# Patient Record
Sex: FEMALE | Race: WHITE | Employment: STUDENT | URBAN - NONMETROPOLITAN AREA
[De-identification: names, ages, dates, MRNs, and addresses within clinical notes are randomized per-mention and may not be internally consistent; named-entity substitution may affect disease eponyms.]

---
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
## 2023-01-26 ENCOUNTER — HOSPITAL ENCOUNTER (INPATIENT)
Age: 23
LOS: 6 days | Discharge: HOME | DRG: 720 | End: 2023-02-01
Attending: EMERGENCY MEDICINE | Admitting: INTERNAL MEDICINE
Payer: MEDICAID

## 2023-01-26 ENCOUNTER — APPOINTMENT (OUTPATIENT)
Dept: GENERAL RADIOLOGY | Age: 23
DRG: 720 | End: 2023-01-26
Payer: MEDICAID

## 2023-01-26 ENCOUNTER — ANESTHESIA (OUTPATIENT)
Dept: OPERATING ROOM | Age: 23
End: 2023-01-26
Payer: MEDICARE

## 2023-01-26 ENCOUNTER — APPOINTMENT (OUTPATIENT)
Dept: CT IMAGING | Age: 23
DRG: 720 | End: 2023-01-26
Payer: MEDICAID

## 2023-01-26 ENCOUNTER — ANESTHESIA EVENT (OUTPATIENT)
Dept: OPERATING ROOM | Age: 23
End: 2023-01-26
Payer: MEDICARE

## 2023-01-26 DIAGNOSIS — N13.5 OBSTRUCTION OF RIGHT URETER: Primary | ICD-10-CM

## 2023-01-26 DIAGNOSIS — A41.9 SEPTICEMIA (HCC): ICD-10-CM

## 2023-01-26 DIAGNOSIS — N20.1 OBSTRUCTION OF RIGHT URETEROPELVIC JUNCTION (UPJ) DUE TO STONE: ICD-10-CM

## 2023-01-26 PROBLEM — N20.0 NEPHROLITHIASIS: Status: ACTIVE | Noted: 2023-01-26

## 2023-01-26 LAB
ALBUMIN SERPL BCG-MCNC: 3.7 G/DL (ref 3.5–5.1)
ALP SERPL-CCNC: 99 U/L (ref 38–126)
ALT SERPL W/O P-5'-P-CCNC: 52 U/L (ref 11–66)
ANION GAP SERPL CALC-SCNC: 12 MEQ/L (ref 8–16)
ANION GAP SERPL CALC-SCNC: 13 MEQ/L (ref 8–16)
AST SERPL-CCNC: 51 U/L (ref 5–40)
B-HCG SERPL QL: NEGATIVE
BACTERIA: ABNORMAL
BASOPHILS ABSOLUTE: 0.1 THOU/MM3 (ref 0–0.1)
BASOPHILS NFR BLD AUTO: 0.3 %
BILIRUB SERPL-MCNC: 1.7 MG/DL (ref 0.3–1.2)
BILIRUB UR QL STRIP: NEGATIVE
BUN SERPL-MCNC: 7 MG/DL (ref 7–22)
BUN SERPL-MCNC: 8 MG/DL (ref 7–22)
CALCIUM SERPL-MCNC: 7.3 MG/DL (ref 8.5–10.5)
CALCIUM SERPL-MCNC: 8.7 MG/DL (ref 8.5–10.5)
CASTS #/AREA URNS LPF: ABNORMAL /LPF
CASTS #/AREA URNS LPF: ABNORMAL /LPF
CHARACTER UR: ABNORMAL
CHARCOAL URNS QL MICRO: ABNORMAL
CHLORIDE SERPL-SCNC: 104 MEQ/L (ref 98–111)
CHLORIDE SERPL-SCNC: 96 MEQ/L (ref 98–111)
CO2 SERPL-SCNC: 20 MEQ/L (ref 23–33)
CO2 SERPL-SCNC: 21 MEQ/L (ref 23–33)
COLOR UR: YELLOW
CREAT SERPL-MCNC: 1.1 MG/DL (ref 0.4–1.2)
CREAT SERPL-MCNC: 1.1 MG/DL (ref 0.4–1.2)
CRYSTALS URNS QL MICRO: ABNORMAL
D DIMER PPP IA.FEU-MCNC: 2303 NG/ML FEU (ref 0–500)
DEPRECATED RDW RBC AUTO: 45.1 FL (ref 35–45)
EOSINOPHIL NFR BLD AUTO: 0.2 %
EOSINOPHILS ABSOLUTE: 0.1 THOU/MM3 (ref 0–0.4)
EPITHELIAL CELLS, UA: ABNORMAL /HPF
ERYTHROCYTE [DISTWIDTH] IN BLOOD BY AUTOMATED COUNT: 14 % (ref 11.5–14.5)
GFR SERPL CREATININE-BSD FRML MDRD: > 60 ML/MIN/1.73M2
GFR SERPL CREATININE-BSD FRML MDRD: > 60 ML/MIN/1.73M2
GLUCOSE SERPL-MCNC: 114 MG/DL (ref 70–108)
GLUCOSE SERPL-MCNC: 151 MG/DL (ref 70–108)
GLUCOSE UR QL STRIP.AUTO: NEGATIVE MG/DL
HCT VFR BLD AUTO: 41.5 % (ref 37–47)
HGB BLD-MCNC: 13.9 GM/DL (ref 12–16)
HGB UR QL STRIP.AUTO: ABNORMAL
IMM GRANULOCYTES # BLD AUTO: 0.39 THOU/MM3 (ref 0–0.07)
IMM GRANULOCYTES NFR BLD AUTO: 1.4 %
INR PPP: 1.68 (ref 0.85–1.13)
KETONES UR QL STRIP.AUTO: ABNORMAL
LACTATE SERPL-SCNC: 1.7 MMOL/L (ref 0.5–2)
LACTATE SERPL-SCNC: 1.8 MMOL/L (ref 0.5–2)
LACTIC ACID, SEPSIS: 2 MMOL/L (ref 0.5–1.9)
LEUKOCYTE ESTERASE UR QL STRIP.AUTO: ABNORMAL
LIPASE SERPL-CCNC: 13.7 U/L (ref 5.6–51.3)
LYMPHOCYTES ABSOLUTE: 0.9 THOU/MM3 (ref 1–4.8)
LYMPHOCYTES NFR BLD AUTO: 3.4 %
MCH RBC QN AUTO: 29.5 PG (ref 26–33)
MCHC RBC AUTO-ENTMCNC: 33.5 GM/DL (ref 32.2–35.5)
MCV RBC AUTO: 88.1 FL (ref 81–99)
MONOCYTES ABSOLUTE: 1.5 THOU/MM3 (ref 0.4–1.3)
MONOCYTES NFR BLD AUTO: 5.5 %
NEUTROPHILS NFR BLD AUTO: 89.2 %
NITRITE UR QL STRIP.AUTO: NEGATIVE
NRBC BLD AUTO-RTO: 0 /100 WBC
OSMOLALITY SERPL CALC.SUM OF ELEC: 262 MOSMOL/KG (ref 275–300)
PH UR STRIP.AUTO: 6.5 [PH] (ref 5–9)
PLATELET # BLD AUTO: 305 THOU/MM3 (ref 130–400)
PMV BLD AUTO: 11.7 FL (ref 9.4–12.4)
POTASSIUM SERPL-SCNC: 3.6 MEQ/L (ref 3.5–5.2)
POTASSIUM SERPL-SCNC: 3.7 MEQ/L (ref 3.5–5.2)
PROT SERPL-MCNC: 7.1 G/DL (ref 6.1–8)
PROT UR STRIP.AUTO-MCNC: 30 MG/DL
RBC # BLD AUTO: 4.71 MILL/MM3 (ref 4.2–5.4)
RBC #/AREA URNS HPF: ABNORMAL /HPF
RENAL EPI CELLS #/AREA URNS HPF: ABNORMAL /[HPF]
SEGMENTED NEUTROPHILS ABSOLUTE COUNT: 24.2 THOU/MM3 (ref 1.8–7.7)
SODIUM SERPL-SCNC: 130 MEQ/L (ref 135–145)
SODIUM SERPL-SCNC: 136 MEQ/L (ref 135–145)
SODIUM UR-SCNC: 46 MEQ/L
SPECIFIC GRAVITY UA: 1.01 (ref 1–1.03)
TROPONIN T: < 0.01 NG/ML
TSH SERPL DL<=0.005 MIU/L-ACNC: 0.42 UIU/ML (ref 0.4–4.2)
UROBILINOGEN, URINE: 1 EU/DL (ref 0–1)
WBC # BLD AUTO: 27.1 THOU/MM3 (ref 4.8–10.8)
WBC #/AREA URNS HPF: > 200 /HPF
YEAST LIKE FUNGI URNS QL MICRO: ABNORMAL

## 2023-01-26 PROCEDURE — 3209999900 FLUORO FOR SURGICAL PROCEDURES

## 2023-01-26 PROCEDURE — 84443 ASSAY THYROID STIM HORMONE: CPT

## 2023-01-26 PROCEDURE — 93010 ELECTROCARDIOGRAM REPORT: CPT | Performed by: INTERNAL MEDICINE

## 2023-01-26 PROCEDURE — 36415 COLL VENOUS BLD VENIPUNCTURE: CPT

## 2023-01-26 PROCEDURE — 2500000003 HC RX 250 WO HCPCS: Performed by: NURSE ANESTHETIST, CERTIFIED REGISTERED

## 2023-01-26 PROCEDURE — 84484 ASSAY OF TROPONIN QUANT: CPT

## 2023-01-26 PROCEDURE — 2580000003 HC RX 258: Performed by: UROLOGY

## 2023-01-26 PROCEDURE — 82365 CALCULUS SPECTROSCOPY: CPT

## 2023-01-26 PROCEDURE — 6360000002 HC RX W HCPCS

## 2023-01-26 PROCEDURE — 74177 CT ABD & PELVIS W/CONTRAST: CPT

## 2023-01-26 PROCEDURE — 99254 IP/OBS CNSLTJ NEW/EST MOD 60: CPT | Performed by: UROLOGY

## 2023-01-26 PROCEDURE — 71046 X-RAY EXAM CHEST 2 VIEWS: CPT

## 2023-01-26 PROCEDURE — 6370000000 HC RX 637 (ALT 250 FOR IP): Performed by: EMERGENCY MEDICINE

## 2023-01-26 PROCEDURE — 99223 1ST HOSP IP/OBS HIGH 75: CPT

## 2023-01-26 PROCEDURE — 3700000000 HC ANESTHESIA ATTENDED CARE: Performed by: UROLOGY

## 2023-01-26 PROCEDURE — 0T768DZ DILATION OF RIGHT URETER WITH INTRALUMINAL DEVICE, VIA NATURAL OR ARTIFICIAL OPENING ENDOSCOPIC: ICD-10-PCS | Performed by: UROLOGY

## 2023-01-26 PROCEDURE — 6370000000 HC RX 637 (ALT 250 FOR IP)

## 2023-01-26 PROCEDURE — 96361 HYDRATE IV INFUSION ADD-ON: CPT

## 2023-01-26 PROCEDURE — 83605 ASSAY OF LACTIC ACID: CPT

## 2023-01-26 PROCEDURE — 84703 CHORIONIC GONADOTROPIN ASSAY: CPT

## 2023-01-26 PROCEDURE — 3600000012 HC SURGERY LEVEL 2 ADDTL 15MIN: Performed by: UROLOGY

## 2023-01-26 PROCEDURE — 80053 COMPREHEN METABOLIC PANEL: CPT

## 2023-01-26 PROCEDURE — 96374 THER/PROPH/DIAG INJ IV PUSH: CPT

## 2023-01-26 PROCEDURE — 85379 FIBRIN DEGRADATION QUANT: CPT

## 2023-01-26 PROCEDURE — C2617 STENT, NON-COR, TEM W/O DEL: HCPCS | Performed by: UROLOGY

## 2023-01-26 PROCEDURE — 85610 PROTHROMBIN TIME: CPT

## 2023-01-26 PROCEDURE — 93005 ELECTROCARDIOGRAM TRACING: CPT

## 2023-01-26 PROCEDURE — 99285 EMERGENCY DEPT VISIT HI MDM: CPT

## 2023-01-26 PROCEDURE — 6360000002 HC RX W HCPCS: Performed by: EMERGENCY MEDICINE

## 2023-01-26 PROCEDURE — 2580000003 HC RX 258: Performed by: EMERGENCY MEDICINE

## 2023-01-26 PROCEDURE — 3700000001 HC ADD 15 MINUTES (ANESTHESIA): Performed by: UROLOGY

## 2023-01-26 PROCEDURE — 2709999900 HC NON-CHARGEABLE SUPPLY: Performed by: UROLOGY

## 2023-01-26 PROCEDURE — 6360000002 HC RX W HCPCS: Performed by: NURSE ANESTHETIST, CERTIFIED REGISTERED

## 2023-01-26 PROCEDURE — 7100000001 HC PACU RECOVERY - ADDTL 15 MIN: Performed by: UROLOGY

## 2023-01-26 PROCEDURE — 6370000000 HC RX 637 (ALT 250 FOR IP): Performed by: PHYSICIAN ASSISTANT

## 2023-01-26 PROCEDURE — 7100000000 HC PACU RECOVERY - FIRST 15 MIN: Performed by: UROLOGY

## 2023-01-26 PROCEDURE — 85025 COMPLETE CBC W/AUTO DIFF WBC: CPT

## 2023-01-26 PROCEDURE — 83690 ASSAY OF LIPASE: CPT

## 2023-01-26 PROCEDURE — 84300 ASSAY OF URINE SODIUM: CPT

## 2023-01-26 PROCEDURE — 81001 URINALYSIS AUTO W/SCOPE: CPT

## 2023-01-26 PROCEDURE — 96375 TX/PRO/DX INJ NEW DRUG ADDON: CPT

## 2023-01-26 PROCEDURE — 93005 ELECTROCARDIOGRAM TRACING: CPT | Performed by: EMERGENCY MEDICINE

## 2023-01-26 PROCEDURE — 87040 BLOOD CULTURE FOR BACTERIA: CPT

## 2023-01-26 PROCEDURE — 2580000003 HC RX 258

## 2023-01-26 PROCEDURE — 2580000003 HC RX 258: Performed by: NURSE ANESTHETIST, CERTIFIED REGISTERED

## 2023-01-26 PROCEDURE — 2140000000 HC CCU INTERMEDIATE R&B

## 2023-01-26 PROCEDURE — 3600000002 HC SURGERY LEVEL 2 BASE: Performed by: UROLOGY

## 2023-01-26 PROCEDURE — C1769 GUIDE WIRE: HCPCS | Performed by: UROLOGY

## 2023-01-26 PROCEDURE — 6360000004 HC RX CONTRAST MEDICATION: Performed by: EMERGENCY MEDICINE

## 2023-01-26 DEVICE — URETERAL STENT
Type: IMPLANTABLE DEVICE | Status: FUNCTIONAL
Brand: PERCUFLEX™ PLUS

## 2023-01-26 RX ORDER — SODIUM CHLORIDE, SODIUM LACTATE, POTASSIUM CHLORIDE, AND CALCIUM CHLORIDE .6; .31; .03; .02 G/100ML; G/100ML; G/100ML; G/100ML
2000 INJECTION, SOLUTION INTRAVENOUS ONCE
Status: COMPLETED | OUTPATIENT
Start: 2023-01-26 | End: 2023-01-26

## 2023-01-26 RX ORDER — SODIUM CHLORIDE 9 MG/ML
INJECTION, SOLUTION INTRAVENOUS PRN
Status: DISCONTINUED | OUTPATIENT
Start: 2023-01-26 | End: 2023-02-01 | Stop reason: HOSPADM

## 2023-01-26 RX ORDER — 0.9 % SODIUM CHLORIDE 0.9 %
1000 INTRAVENOUS SOLUTION INTRAVENOUS ONCE
Status: COMPLETED | OUTPATIENT
Start: 2023-01-26 | End: 2023-01-26

## 2023-01-26 RX ORDER — ONDANSETRON 2 MG/ML
4 INJECTION INTRAMUSCULAR; INTRAVENOUS EVERY 6 HOURS PRN
Status: DISCONTINUED | OUTPATIENT
Start: 2023-01-26 | End: 2023-02-01 | Stop reason: HOSPADM

## 2023-01-26 RX ORDER — ROCURONIUM BROMIDE 10 MG/ML
INJECTION, SOLUTION INTRAVENOUS PRN
Status: DISCONTINUED | OUTPATIENT
Start: 2023-01-26 | End: 2023-01-26 | Stop reason: SDUPTHER

## 2023-01-26 RX ORDER — ACETAMINOPHEN 650 MG/1
650 SUPPOSITORY RECTAL EVERY 6 HOURS PRN
Status: DISCONTINUED | OUTPATIENT
Start: 2023-01-26 | End: 2023-01-28

## 2023-01-26 RX ORDER — SODIUM CHLORIDE 0.9 % (FLUSH) 0.9 %
10 SYRINGE (ML) INJECTION PRN
Status: DISCONTINUED | OUTPATIENT
Start: 2023-01-26 | End: 2023-02-01 | Stop reason: HOSPADM

## 2023-01-26 RX ORDER — ACETAMINOPHEN 500 MG
1000 TABLET ORAL ONCE
Status: DISCONTINUED | OUTPATIENT
Start: 2023-01-26 | End: 2023-01-26

## 2023-01-26 RX ORDER — ACETAMINOPHEN 325 MG/1
650 TABLET ORAL EVERY 6 HOURS PRN
Status: DISCONTINUED | OUTPATIENT
Start: 2023-01-26 | End: 2023-01-28

## 2023-01-26 RX ORDER — SODIUM CHLORIDE, SODIUM LACTATE, POTASSIUM CHLORIDE, CALCIUM CHLORIDE 600; 310; 30; 20 MG/100ML; MG/100ML; MG/100ML; MG/100ML
INJECTION, SOLUTION INTRAVENOUS CONTINUOUS
Status: DISCONTINUED | OUTPATIENT
Start: 2023-01-26 | End: 2023-01-27 | Stop reason: ALTCHOICE

## 2023-01-26 RX ORDER — LIDOCAINE HYDROCHLORIDE 20 MG/ML
INJECTION, SOLUTION INFILTRATION; PERINEURAL PRN
Status: DISCONTINUED | OUTPATIENT
Start: 2023-01-26 | End: 2023-01-26 | Stop reason: SDUPTHER

## 2023-01-26 RX ORDER — ACETAMINOPHEN 650 MG/1
650 SUPPOSITORY RECTAL ONCE
Status: COMPLETED | OUTPATIENT
Start: 2023-01-26 | End: 2023-01-26

## 2023-01-26 RX ORDER — ONDANSETRON 4 MG/1
4 TABLET, ORALLY DISINTEGRATING ORAL EVERY 8 HOURS PRN
Status: DISCONTINUED | OUTPATIENT
Start: 2023-01-26 | End: 2023-02-01 | Stop reason: HOSPADM

## 2023-01-26 RX ORDER — PHENYLEPHRINE HYDROCHLORIDE 10 MG/ML
INJECTION INTRAVENOUS PRN
Status: DISCONTINUED | OUTPATIENT
Start: 2023-01-26 | End: 2023-01-26 | Stop reason: SDUPTHER

## 2023-01-26 RX ORDER — SUCCINYLCHOLINE/SOD CL,ISO/PF 200MG/10ML
SYRINGE (ML) INTRAVENOUS PRN
Status: DISCONTINUED | OUTPATIENT
Start: 2023-01-26 | End: 2023-01-26 | Stop reason: SDUPTHER

## 2023-01-26 RX ORDER — POLYETHYLENE GLYCOL 3350 17 G/17G
17 POWDER, FOR SOLUTION ORAL DAILY PRN
Status: DISCONTINUED | OUTPATIENT
Start: 2023-01-26 | End: 2023-02-01 | Stop reason: HOSPADM

## 2023-01-26 RX ORDER — SODIUM CHLORIDE 0.9 % (FLUSH) 0.9 %
10 SYRINGE (ML) INJECTION EVERY 12 HOURS SCHEDULED
Status: DISCONTINUED | OUTPATIENT
Start: 2023-01-26 | End: 2023-02-01 | Stop reason: HOSPADM

## 2023-01-26 RX ORDER — ONDANSETRON 2 MG/ML
4 INJECTION INTRAMUSCULAR; INTRAVENOUS ONCE
Status: COMPLETED | OUTPATIENT
Start: 2023-01-26 | End: 2023-01-26

## 2023-01-26 RX ORDER — SODIUM CHLORIDE 9 MG/ML
INJECTION, SOLUTION INTRAVENOUS CONTINUOUS PRN
Status: DISCONTINUED | OUTPATIENT
Start: 2023-01-26 | End: 2023-01-26 | Stop reason: SDUPTHER

## 2023-01-26 RX ORDER — ENOXAPARIN SODIUM 100 MG/ML
40 INJECTION SUBCUTANEOUS EVERY 24 HOURS
Status: DISCONTINUED | OUTPATIENT
Start: 2023-01-26 | End: 2023-02-01 | Stop reason: HOSPADM

## 2023-01-26 RX ADMIN — SODIUM CHLORIDE 1000 ML: 9 INJECTION, SOLUTION INTRAVENOUS at 07:55

## 2023-01-26 RX ADMIN — Medication 140 MG: at 12:08

## 2023-01-26 RX ADMIN — SUGAMMADEX 200 MG: 100 INJECTION, SOLUTION INTRAVENOUS at 12:42

## 2023-01-26 RX ADMIN — LIDOCAINE HYDROCHLORIDE 100 MG: 20 INJECTION, SOLUTION INFILTRATION; PERINEURAL at 12:08

## 2023-01-26 RX ADMIN — SODIUM CHLORIDE, POTASSIUM CHLORIDE, SODIUM LACTATE AND CALCIUM CHLORIDE: 600; 310; 30; 20 INJECTION, SOLUTION INTRAVENOUS at 14:06

## 2023-01-26 RX ADMIN — SODIUM CHLORIDE: 9 INJECTION, SOLUTION INTRAVENOUS at 12:05

## 2023-01-26 RX ADMIN — SODIUM CHLORIDE 1000 ML: 9 INJECTION, SOLUTION INTRAVENOUS at 08:52

## 2023-01-26 RX ADMIN — IOPAMIDOL 80 ML: 755 INJECTION, SOLUTION INTRAVENOUS at 09:32

## 2023-01-26 RX ADMIN — ONDANSETRON 4 MG: 2 INJECTION INTRAMUSCULAR; INTRAVENOUS at 07:56

## 2023-01-26 RX ADMIN — ACETAMINOPHEN 650 MG: 325 TABLET ORAL at 19:02

## 2023-01-26 RX ADMIN — ACETAMINOPHEN 650 MG: 650 SUPPOSITORY RECTAL at 10:17

## 2023-01-26 RX ADMIN — SODIUM CHLORIDE, POTASSIUM CHLORIDE, SODIUM LACTATE AND CALCIUM CHLORIDE 2000 ML: 600; 310; 30; 20 INJECTION, SOLUTION INTRAVENOUS at 16:09

## 2023-01-26 RX ADMIN — ROCURONIUM BROMIDE 35 MG: 10 INJECTION INTRAVENOUS at 12:22

## 2023-01-26 RX ADMIN — ROCURONIUM BROMIDE 5 MG: 10 INJECTION INTRAVENOUS at 12:08

## 2023-01-26 RX ADMIN — SODIUM CHLORIDE 1000 ML: 9 INJECTION, SOLUTION INTRAVENOUS at 10:29

## 2023-01-26 RX ADMIN — PHENYLEPHRINE HYDROCHLORIDE 100 MCG: 10 INJECTION INTRAVENOUS at 12:06

## 2023-01-26 RX ADMIN — CEFTRIAXONE SODIUM 1000 MG: 1 INJECTION, POWDER, FOR SOLUTION INTRAMUSCULAR; INTRAVENOUS at 10:17

## 2023-01-26 RX ADMIN — ENOXAPARIN SODIUM 40 MG: 100 INJECTION SUBCUTANEOUS at 20:55

## 2023-01-26 RX ADMIN — ACETAMINOPHEN 325 MG: 325 SUPPOSITORY RECTAL at 21:30

## 2023-01-26 ASSESSMENT — PAIN - FUNCTIONAL ASSESSMENT
PAIN_FUNCTIONAL_ASSESSMENT: NONE - DENIES PAIN

## 2023-01-26 ASSESSMENT — LIFESTYLE VARIABLES: HOW OFTEN DO YOU HAVE A DRINK CONTAINING ALCOHOL: NEVER

## 2023-01-26 NOTE — ANESTHESIA POSTPROCEDURE EVALUATION
Department of Anesthesiology  Postprocedure Note    Patient: Cheng Rojo  MRN: [id number]  YOB: 2000  Date of evaluation: 1/26/2023      Procedure Summary     Date: 01/26/23 Room / Location: [facility] / [facility]    Anesthesia Start: 4926 Anesthesia Stop: 1251    Procedure: Cystoscopy Right Stent Insertion (Right) Diagnosis:       Obstruction of right ureteropelvic junction (UPJ) due to stone      (Obstruction of right ureteropelvic junction (UPJ) due to stone [N20.1])    Surgeons: Deana Alexandre MD Responsible Provider: Dayanna Martinez DO    Anesthesia Type: General ASA Status: 2          Anesthesia Type: General    Ck Phase I: Ck Score: 9    Ck Phase II:        Anesthesia Post Evaluation    Comments: Bernice Jimenez 58  POST-ANESTHESIA NOTE       Name:  Cheng Rojo                                         Age:  23 y.o.   MRN:  [id number]      Last Vitals:  BP (!) 92/54   Pulse (!) 135   Temp 100.1 °F (37.8 °C) (Oral)   Resp 20   Ht 5' 2\" (1.575 m)   Wt 203 lb (92.1 kg)   LMP 01/12/2023 (Exact Date)   SpO2 96%   BMI 37.13 kg/m²   Patient Vitals in the past 4 hrs:  01/26/23 1515, BP:(!) 92/54, Pulse:(!) 135  01/26/23 1330, BP:(!) 95/53, Temp:100.1 °F (37.8 °C), Temp src:Oral, Pulse:(!) 131, Resp:20, SpO2:96 %, Height:5' 2\" (1.575 m), Weight:203 lb (92.1 kg)  01/26/23 1320, BP:(!) 92/55, Pulse:(!) 133, Resp:22, SpO2:96 %  01/26/23 1315, BP:(!) 96/52, Pulse:(!) 132, Resp:26, SpO2:96 %  01/26/23 1310, BP:(!) 98/52, Pulse:(!) 135, Resp:28, SpO2:97 %  01/26/23 1305, BP:(!) 97/55, Temp:(!) 100.8 °F (38.2 °C), Temp src:Axillary, Pulse:(!) 133, Resp:28, SpO2:94 %  01/26/23 1300, BP:(!) 98/57, Pulse:(!) 134, Resp:26, SpO2:93 %  01/26/23 1255, BP:(!) 103/58, Pulse:(!) 135, Resp:24, SpO2:93 %  01/26/23 1250, BP:(!) 107/54, Pulse:(!) 143, Resp:26, SpO2:93 %  01/26/23 1245, BP:(!) 112/58, Temp:99.1 °F (37.3 °C), Temp src:Temporal, Pulse:(!) 144, Resp:28, SpO2:92 %    Level of Consciousness:  Awake    Respiratory:  Stable    Oxygen Saturation:  Stable    Cardiovascular:  Stable    Hydration:  Adequate    PONV:  Stable    Post-op Pain:  Adequate analgesia    Post-op Assessment:  No apparent anesthetic complications    Additional Follow-Up / Treatment / Comment:  None    Brian Holloway.  [address] [facility]   January 26, 2023   3:55 PM

## 2023-01-26 NOTE — ED TRIAGE NOTES
Pt presents to the ED through triage with c/c emesis. Pt reports that she believes she passed a kidney stone yesterday and since then, she has has been vomiting. Pt denies pain on arrival, reports nausea. Vitals as documented.

## 2023-01-26 NOTE — H&P
.        Hospitalist History & Physical    Patient:  Bc Pantoja    Unit/Bed:SUMEET /SUMEET  YOB: 2000  MRN: [id number]   Acct: [de-identified]   PCP: DARI Chavez CNP  Code Status: Prior    Date of Service: Pt seen/examined on 01/26/23 and admitted to Inpatient with expected LOS greater than two midnights due to medical therapy. Chief Complaint: emesis    Assessment/Plan:    Sepsis: likely 2/2 pyelonephritis, possible appendicitis per CT? Sepsis  Identified at 1217  SIRS Criteria Temperature >100.4 or <96.8, Heart Rate >90, and WBC >12k or <4k or >10% Bands  Organ Dysfunction: None  Cultures Blood x2  Antibiotics Piperacillin - Tazobactam  and Ceftriaxone  Volume Expansion: 30cc/kg of Normal Saline was received/ordered- per ER. Obstructing Nephrolithiasis: CT shows a 3mm stone at the UVJ. ER consulted urology - patient undergoing procedure for removal 1/26. Urology following and appreciate recs. Pyelonephritis: CT shows right perinephric stranding and inflammation with diminished enhancement of right renal parenchyma and renal pelvis. Received zosyn and ceftriaxone in the ER. Continue Ceftriaxone and continue to monitor. Sinus Tachycardia: continued tachycardia despite 3L of fluids, continue IVF at 100 ml/hr. EKG shows sinus tachycardia. Febrile to 103. Trop pending. Monitor for improvement after procedure for removal of obstructing stone. Could consider echo if no improvements in HR. Possible Appendicitis?: CT shows 7 mm in diameter appendix contains fluid, no periappendiceal fat stranding. ER discussed with gen/surg. Gen/surg feels an acute appendicitis is unlikely at this time. Hyponatremia: 130 on admission. Likely 2/2 hypovolemia hyponatremia from emesis. IVF bolus and infusion. Repeat bmp pending and urine sodium pending. Continue to monitor. Hx of Hydrocephalus s/p shunt: noted per chart review, sequelae of prematurity.      History of Present Illness:  Hyacinth ARMSTRONG Davy Armstrong is a 23 y.o. female with PMHx of hydrocephalus and prematurity who presented to [facility] with chief complaint of emesis. Patient reports she started vomiting yesterday with no associated nausea. States she could not keep anything down and continued to vomit today prompting her to come to the ER. She reports no associated pain with the vomiting. States she does not have abdominal pain, no urinary burning/discomfort/hesitancy/ frequency. States she does feel chilled and lightheaded with standing. Denies any headache, chest pain, palpitations, shortness of breath, and abdominal pain. Review of Systems: Pertinent positives as noted in the HPI. All other systems reviewed and negative. Past Medical History:        Diagnosis Date    ADHD (attention deficit hyperactivity disorder)     Premature baby     27 weeks       Past Surgical History:        Procedure Laterality Date    ACHILLES TENDON SURGERY      CHOLECYSTECTOMY, LAPAROSCOPIC N/A 7/16/2017    LAPAROSCOPY; OPEN CHOLECYSTECTOMY performed by Aime Brooks MD at [facility]  shunt 1999    VENTRICULOPERITONEAL SHUNT         Home Medications:   No current facility-administered medications on file prior to encounter. No current outpatient medications on file prior to encounter. Allergies:    Patient has no known allergies. Social History:    reports that she has never smoked. She does not have any smokeless tobacco history on file. She reports that she does not drink alcohol and does not use drugs. Family History:   History reviewed. No pertinent family history. Diet:  Diet NPO      Physical Exam:  BP (!) 100/51   Pulse (!) 143   Temp 98.6 °F (37 °C) (Oral)   Resp 20   Ht 5' 2\" (1.575 m)   Wt 203 lb (92.1 kg)   LMP 01/12/2023 (Exact Date)   SpO2 94%   BMI 37.13 kg/m²   General:  Pleasant female resting in bed, no apparent distress. HEENT:  normocephalic and atraumatic. No scleral icterus. PERR.  Neck: supple. No JVD. Trachea midline. Lungs: clear to auscultation. No retractions  Cardiac: Tachycardia, regular rhythm without murmur. Abdomen:  Extremely tender with guarding to the RLL and suprapubic regions. Nontender to LLL and upper quadrants. Bowel sounds positive. Extremities:  No clubbing, cyanosis, or edema x 4. Vasculature: capillary refill < 3 seconds. Palpable LE pulses bilaterally. Skin:  warm and dry. No rashes or lesions. Psych:  Alert and oriented x3. Affect flat  Neurologic:  No focal deficit. No seizures. Data: (All radiographs, tracings, PFTs, and imaging are personally viewed and interpreted unless otherwise noted)  Labs:   Recent Labs     01/26/23  0800   WBC 27.1*   HGB 13.9   HCT 41.5        Recent Labs     01/26/23  0800   *   K 3.6   CL 96*   CO2 21*   BUN 8   CREATININE 1.1   CALCIUM 8.7     Recent Labs     01/26/23  0800   AST 51*   ALT 52   BILITOT 1.7*   ALKPHOS 99     No results for input(s): INR in the last 72 hours. No results for input(s): Marylou Earnest in the last 72 hours. Urinalysis:   Lab Results   Component Value Date/Time    NITRU NEGATIVE 01/26/2023 08:55 AM    WBCUA > 200 01/26/2023 08:55 AM    BACTERIA FEW 01/26/2023 08:55 AM    RBCUA 5-10 01/26/2023 08:55 AM    BLOODU MODERATE 01/26/2023 08:55 AM    SPECGRAV 1.009 01/26/2023 08:55 AM    GLUCOSEU NEGATIVE 07/16/2017 05:00 AM       EKG: sinus tachycardia, cgmw=139    Radiology:  CT ABDOMEN PELVIS W IV CONTRAST Additional Contrast? None   Final Result   1. A 3 mm obstructing calculus at the right ureterovesicular junction results in mild right hydronephrosis and hydroureter with moderate right perinephric stranding and inflammation. Diminished enhancement of the right renal parenchyma as well as    enhancement of the right renal pelvis and proximal right ureter suggest pyelonephritis. Correlate with urinalysis.       2. The appendix measures 7 mm in diameter and contains fluid however there is no periappendiceal fat stranding or inflammation to suggest acute appendicitis. 3. Limited examination secondary to patient motion. Chronic findings are discussed. **This report has been created using voice recognition software. It may contain minor errors which are inherent in voice recognition technology. **      Final report electronically signed by Dr Lizbet Frias on 1/26/2023 9:50 AM        CT ABDOMEN PELVIS W IV CONTRAST Additional Contrast? None    Result Date: 1/26/2023  PROCEDURE: CT ABDOMEN PELVIS W IV CONTRAST CLINICAL INFORMATION: Right-sided abdominal pain. COMPARISON: CT abdomen and pelvis 7/16/2017. Gallbladder ultrasound 7/16/2017. TECHNIQUE: Axial 5 mm CT images were obtained through the abdomen and pelvis after the administration of 80  cc Isovue 370 intravenous contrast. Coronal and sagittal reconstructions were obtained. All CT scans at this facility use dose modulation, iterative reconstruction, and/or weight-based dosing when appropriate to reduce radiation dose to as low as reasonably achievable. FINDINGS: Lung bases: Dependent atelectasis is present. Liver/gallbladder/bilary tree: The gallbladder is surgically absent. No biliary ductal dilatation is observed. Hepatic steatosis appears unchanged. Pancreas: The evaluation is limited secondary to patient motion. Spleen : Unremarkable accounting for patient motion. Adrenal glands: Unremarkable accounting for patient motion. Kidneys/ ureters/ bladder: A 3 mm obstructing calculus at the right ureterovesicular junction (series 2, image 78) resultant mild right hydronephrosis and right hydroureter. The right renal pelvis and proximal right ureter are enhancing with moderate perinephric fat stranding and inflammation observed. Patchy diminished enhancement of the right renal parenchyma is visualized. The left kidney is unremarkable. The urinary bladder is partially distended and grossly unremarkable. Gastrointestinal:  A  shunt in the right upper quadrant appears intact. No bowel obstruction, free fluid, fluid collection, or free air is observed. The appendix contains fluid however no periappendiceal fat stranding or inflammation is observed. A moderate sliding-type hiatal hernia appears stable. Retroperitoneum / lymph nodes: The aorta is not dilated. No lymphadenopathy is visualized. Pelvis: No adnexal lesions are observed. A 10 mm follicle is seen within the right ovary. Musculoskeletal: The visualized skeletal structures appear intact. 1. A 3 mm obstructing calculus at the right ureterovesicular junction results in mild right hydronephrosis and hydroureter with moderate right perinephric stranding and inflammation. Diminished enhancement of the right renal parenchyma as well as enhancement of the right renal pelvis and proximal right ureter suggest pyelonephritis. Correlate with urinalysis. 2. The appendix measures 7 mm in diameter and contains fluid however there is no periappendiceal fat stranding or inflammation to suggest acute appendicitis. 3. Limited examination secondary to patient motion. Chronic findings are discussed. **This report has been created using voice recognition software. It may contain minor errors which are inherent in voice recognition technology. ** Final report electronically signed by Dr Sparkle Villa on 1/26/2023 9:50 AM        Tele:   [x] yes             [] no      Thank you Sedonia Nyhan, APRN - CNP for the opportunity to be involved in this patient's care.     Electronically signed by Jose M Thompson PA-C on 1/26/2023 at 12:14 PM

## 2023-01-26 NOTE — ANESTHESIA PRE PROCEDURE
Department of Anesthesiology  Preprocedure Note       Name:  Dalton De La Cruz   Age:  23 y. o.  :  2000                                          MRN:  [id number]         Date:  2023      Surgeon: Essence De La Garza):  Jhony Franco MD    Procedure: Procedure(s):  Cystoscopy Right Stent vs Laser Lithotripsy Basket Retrieval of Stone Fragments    Medications prior to admission:   Prior to Admission medications    Not on File       Current medications:    No current facility-administered medications for this encounter. No current outpatient medications on file. Allergies:  No Known Allergies    Problem List:    Patient Active Problem List   Diagnosis Code    Abdominal pain R10.9    Nephrolithiasis N20.0       Past Medical History:        Diagnosis Date    ADHD (attention deficit hyperactivity disorder)     Premature baby     27 weeks       Past Surgical History:        Procedure Laterality Date    ACHILLES TENDON SURGERY      CHOLECYSTECTOMY, LAPAROSCOPIC N/A 2017    LAPAROSCOPY; OPEN CHOLECYSTECTOMY performed by Irene Yeung MD at [facility]      revision  shunt     VENTRICULOPERITONEAL SHUNT         Social History:    Social History     Tobacco Use    Smoking status: Never    Smokeless tobacco: Not on file   Substance Use Topics    Alcohol use:  No                                Counseling given: Not Answered      Vital Signs (Current):   Vitals:    23 0848 23 0953 23 1027 23 1136   BP:  (!) 107/52 (!) 105/55 (!) 100/51   Pulse:  (S) (!) 150 (!) 146 (!) 143   Resp:  20 20 20   Temp:  (S) (!) 103 °F (39.4 °C)  98.6 °F (37 °C)   TempSrc:  Oral  Oral   SpO2:  98% 97% 94%   Weight: 203 lb (92.1 kg)      Height:                                                  BP Readings from Last 3 Encounters:   23 (!) 100/51   17 118/70   17 127/75       NPO Status: BMI:   Wt Readings from Last 3 Encounters:   01/26/23 203 lb (92.1 kg)   07/31/17 166 lb (75.3 kg) (91 %, Z= 1.35)*   07/20/17 169 lb 3.2 oz (76.7 kg) (92 %, Z= 1.42)*     * Growth percentiles are based on [facility] (Girls, 2-20 Years) data. Body mass index is 37.13 kg/m². CBC:   Lab Results   Component Value Date/Time    WBC 27.1 01/26/2023 08:00 AM    RBC 4.71 01/26/2023 08:00 AM    HGB 13.9 01/26/2023 08:00 AM    HCT 41.5 01/26/2023 08:00 AM    MCV 88.1 01/26/2023 08:00 AM    RDW 13.8 07/18/2017 09:10 AM     01/26/2023 08:00 AM       CMP:   Lab Results   Component Value Date/Time     01/26/2023 08:00 AM    K 3.6 01/26/2023 08:00 AM    CL 96 01/26/2023 08:00 AM    CO2 21 01/26/2023 08:00 AM    BUN 8 01/26/2023 08:00 AM    CREATININE 1.1 01/26/2023 08:00 AM    LABGLOM >60 01/26/2023 08:00 AM    GLUCOSE 151 01/26/2023 08:00 AM    PROT 7.1 01/26/2023 08:00 AM    CALCIUM 8.7 01/26/2023 08:00 AM    BILITOT 1.7 01/26/2023 08:00 AM    ALKPHOS 99 01/26/2023 08:00 AM    AST 51 01/26/2023 08:00 AM    ALT 52 01/26/2023 08:00 AM       POC Tests: No results for input(s): POCGLU, POCNA, POCK, POCCL, POCBUN, POCHEMO, POCHCT in the last 72 hours.     Coags: No results found for: PROTIME, INR, APTT    HCG (If Applicable):   Lab Results   Component Value Date    PREGSERUM NEGATIVE 01/26/2023        ABGs: No results found for: PHART, PO2ART, QFW4YWR, LSK2IOO, BEART, I4GVWCST     Type & Screen (If Applicable):  No results found for: LABABO, LABRH    Drug/Infectious Status (If Applicable):  No results found for: HIV, HEPCAB    COVID-19 Screening (If Applicable): No results found for: COVID19        Anesthesia Evaluation  Patient summary reviewed  Airway: Mallampati: III  TM distance: >3 FB   Neck ROM: full  Mouth opening: > = 3 FB   Dental:          Pulmonary:                              Cardiovascular:          ECG reviewed                        Neuro/Psych:   (+) psychiatric history:             ROS comment:  shunt GI/Hepatic/Renal:             Endo/Other:                     Abdominal:             Vascular: Other Findings:           Anesthesia Plan      MAC     ASA 2       Induction: intravenous. Anesthetic plan and risks discussed with patient. Plan discussed with CRNA. Maye Steele.  [address] [place] [address]   1/26/2023

## 2023-01-26 NOTE — OP NOTE
Operative Note      Patient: Elva Holloway  YOB: 2000  MRN: [id number]    Date of Procedure: 1/26/2023    Pre-Op Diagnosis: right ureteral stone, UTI    Post-Op Diagnosis: Same       Procedure(s):  Cystoscopy Right Stent Insertion    Surgeon(s):  Enrique Dent MD    Assistant:   * No surgical staff found *    Anesthesia: General    Estimated Blood Loss (mL): Minimal    Complications: None    Specimens:   ID Type Source Tests Collected by Time Destination   A : ureteral stone Tissue Ureter SURGICAL PATHOLOGY Enrique Dent MD 1/26/2023 1231        Implants:  Implant Name Type Inv. Item Serial No.  Lot No. LRB No. Used Action   STENT URET 6FR L24CM HYDR+ GRAD CIRCUMFERENTIAL MRK LO PROF - [id number]  STENT URET 6FR L24CM HYDR+ GRAD CIRCUMFERENTIAL MRK LO PROF  [employer] [facility] UROLOGY- [id number] Right 1 Implanted         Drains: * No LDAs found *    Findings: stone within bladder, presumably a passed ureteral stone    Detailed Description of Procedure:     INDICATIONS FOR PROCEDURE:  Elva Holloway is a 23 y.o. female presents for UTI, WBC 27, right ureteral stone, fever 103. After the risks, benefits, alternatives, of the procedure were discussed with the patient, informed consent was obtained. The patient elected to proceed. DETAILS OF THE PROCEDURE:  The patient was brought back to the preoperative holding area to the operating suite, and was transferred to the operating table where the patient lay in supine position. General endotracheal anesthesia was induced, and patient was prepped and draped in standard surgical fashion after being placed in dorsolithotomy position. A proper timeout was performed, preoperative antibiotics were given. A rigid cystoscope with a 22 [place] sheath with 30 degree lens was inserted in the patient's urethral and into the bladder with ease. We then focused our attention on the right ureteral orifice, which we cannulated with our glidewire.  Over our glidewire we placed a 6x24 cm double-J ureteral stent and we noted appropriate placement in the upper collecting system using fluoroscopy. We then removed our wire. There was good proximal and distal curl. We did not leave the string at the end of the stent. We then drained the patient's bladder and removed the scope and the procedure was subsequently terminated.     Plan:    The patient will need to follow up for definitive cystoscopy right ureteroscopy, possible HLL, possible stent removal versus exchange in few weeks    Electronically signed by Kelly Saha M.D, MD on 1/26/2023 at 12:38 PM

## 2023-01-26 NOTE — CONSULTS
[address] [facility],6Th [address] EMERGENCY DEPT  [address] [facility] [address]  Dept: [phone]  Loc: [phone]  Visit Date: 1/26/2023    Urology Consult Note    Reason for Consult:  obstructing stone  Requesting Physician:  ER    History Obtained From:  patient, electronic medical record    Chief Complaint: emesis    HISTORY OF PRESENT ILLNESS:                The patient is a 23 y.o. female with significant past medical history of see below who presents with uncontrolled n/v. Reports she may have passed a kidney stone. Having hypotension, tachy, low grade temp  Also, possible appendicitis on CT, ER contacted gen/surg, they will evaluate   CT shows rigth UVJ 3mm obstructing stone      Past Medical History:        Diagnosis Date    ADHD (attention deficit hyperactivity disorder)     Premature baby     27 weeks     Past Surgical History:        Procedure Laterality Date    ACHILLES TENDON SURGERY      CHOLECYSTECTOMY, LAPAROSCOPIC N/A 7/16/2017    LAPAROSCOPY; OPEN CHOLECYSTECTOMY performed by Bob Macias MD at [facility]  shunt 1999    VENTRICULOPERITONEAL SHUNT       Allergies:  Patient has no known allergies.   Social History:  Social History     Socioeconomic History    Marital status: Single     Spouse name: Not on file    Number of children: Not on file    Years of education: Not on file    Highest education level: Not on file   Occupational History    Not on file   Tobacco Use    Smoking status: Never    Smokeless tobacco: Not on file   Substance and Sexual Activity    Alcohol use: No    Drug use: No    Sexual activity: Yes     Partners: Male   Other Topics Concern    Not on file   Social History Narrative    Not on file     Social Determinants of Health     Financial Resource Strain: Not on file   Food Insecurity: Not on file   Transportation Needs: Not on file   Physical Activity: Not on file   Stress: Not on file   Social Connections: Not on file   Intimate Partner Violence: Not on file   Housing Stability: Not on file     Family History:   History reviewed. No pertinent family history. ROS:  Constitutional: Negative for chills, fatigue, fever, or weight loss. Eyes: Denies reported visual changes. ENT: Denies headache, difficulty swallowing, earache, and nosebleeds. Cardiovascular: Negative for chest pain, palpitations, tachycardia or edema. Respiratory: Denies cough or SOB. GI:++n/v  : see HPI. Musculoskeletal: Patient denies low back pain or painful or reduced range of ROM. Neurological: The patient denies any symptoms of neurological impairment or TIA. Psychiatric: Denies anxiety or depression. Skin: Denies rash or lesions. All remaining ROS negative. PHYSICAL EXAM:  VITALS:  BP (!) 97/54   Pulse (!) 130   Temp 100.1 °F (37.8 °C) (Oral)   Resp 20   Ht 5' 2\" (1.575 m)   Wt 203 lb (92.1 kg)   LMP 01/12/2023 (Exact Date)   SpO2 100%   BMI 37.13 kg/m² . Nursing note and vitals reviewed. Constitutional: Alert and oriented times x3, no acute distress, and cooperative to examination with appropriate mood and affect. HEENT:   Head:         Normocephalic and atraumatic. Mouth/Throat:          Mucous membranes are normal.   Eyes:         EOM are normal. No scleral icterus. Nose:    The external appearance of the nose is normal  Ears: The ears appear normal to external inspection. Cardiovascular:       Normal rate, regular rhythm. Pulmonary/Chest:  Normal respiratory rate and rhthym. No use of accessory muscles. Lungs clear bilaterally. Abdominal:          Soft. Mild tenderness           Genitalia:    Voiding spontaneously  Musculoskeletal:    Normal range of motion. He exhibits no edema or tenderness of lower extremities. Extremities:    No cyanosis, clubbing, or edema present. Neurological:    Alert and oriented.      DATA:  CBC:   Lab Results   Component Value Date/Time    WBC 27.1 01/26/2023 08:00 AM    RBC 4.71 01/26/2023 08:00 AM    HGB 13.9 01/26/2023 08:00 AM    HCT 41.5 01/26/2023 08:00 AM    MCV 88.1 01/26/2023 08:00 AM    MCH 29.5 01/26/2023 08:00 AM    MCHC 33.5 01/26/2023 08:00 AM    RDW 13.8 07/18/2017 09:10 AM     01/26/2023 08:00 AM    MPV 11.7 01/26/2023 08:00 AM     BMP:    Lab Results   Component Value Date/Time     01/26/2023 08:00 AM    K 3.6 01/26/2023 08:00 AM    CL 96 01/26/2023 08:00 AM    CO2 21 01/26/2023 08:00 AM    BUN 8 01/26/2023 08:00 AM    CREATININE 1.1 01/26/2023 08:00 AM    CALCIUM 8.7 01/26/2023 08:00 AM    LABGLOM >60 01/26/2023 08:00 AM    GLUCOSE 151 01/26/2023 08:00 AM     BUN/Creatinine:    Lab Results   Component Value Date/Time    BUN 8 01/26/2023 08:00 AM    CREATININE 1.1 01/26/2023 08:00 AM     Magnesium:  No results found for: MG  Phosphorus:  No results found for: PHOS  PT/INR:  No results found for: PROTIME, INR  U/A:    Lab Results   Component Value Date/Time    COLORU YELLOW 01/26/2023 08:55 AM    PHUR 6.5 01/26/2023 08:55 AM    LABCAST NONE SEEN 01/26/2023 08:55 AM    LABCAST NONE SEEN 01/26/2023 08:55 AM    WBCUA > 200 01/26/2023 08:55 AM    RBCUA 5-10 01/26/2023 08:55 AM    MUCUS THREADS 04/28/2014 02:15 PM    YEAST NONE SEEN 01/26/2023 08:55 AM    BACTERIA FEW 01/26/2023 08:55 AM    SPECGRAV 1.009 01/26/2023 08:55 AM    LEUKOCYTESUR LARGE 01/26/2023 08:55 AM    UROBILINOGEN 1.0 01/26/2023 08:55 AM    BILIRUBINUR NEGATIVE 01/26/2023 08:55 AM    BLOODU MODERATE 01/26/2023 08:55 AM    GLUCOSEU NEGATIVE 07/16/2017 05:00 AM       Imaging: The patient has had a CT Without and With Contrast which I have independently reviewed along with its accompanying report. The study demonstrates   Narrative   PROCEDURE: CT ABDOMEN PELVIS W IV CONTRAST       CLINICAL INFORMATION: Right-sided abdominal pain. COMPARISON: CT abdomen and pelvis 7/16/2017. Gallbladder ultrasound 7/16/2017.        TECHNIQUE: Axial 5 mm CT images were obtained through the abdomen and pelvis after the administration of 80  cc Isovue 370 intravenous contrast. Coronal and sagittal reconstructions were obtained. All CT scans at this facility use dose modulation, iterative reconstruction, and/or weight-based dosing when appropriate to reduce radiation dose to as low as reasonably achievable. FINDINGS:    Lung bases: Dependent atelectasis is present. Liver/gallbladder/bilary tree: The gallbladder is surgically absent. No biliary ductal dilatation is observed. Hepatic steatosis appears unchanged. Pancreas: The evaluation is limited secondary to patient motion. Spleen : Unremarkable accounting for patient motion. Adrenal glands: Unremarkable accounting for patient motion. Kidneys/ ureters/ bladder: A 3 mm obstructing calculus at the right ureterovesicular junction (series 2, image 78) resultant mild right hydronephrosis and right hydroureter. The right renal pelvis and proximal right ureter are enhancing with moderate    perinephric fat stranding and inflammation observed. Patchy diminished enhancement of the right renal parenchyma is visualized. The left kidney is unremarkable. The urinary bladder is partially distended and grossly unremarkable. Gastrointestinal:  A  shunt in the right upper quadrant appears intact. No bowel obstruction, free fluid, fluid collection, or free air is observed. The appendix contains fluid however no periappendiceal fat stranding or inflammation is observed. A    moderate sliding-type hiatal hernia appears stable. Retroperitoneum / lymph nodes: The aorta is not dilated. No lymphadenopathy is visualized. Pelvis: No adnexal lesions are observed. A 10 mm follicle is seen within the right ovary. Musculoskeletal: The visualized skeletal structures appear intact. Impression   1.  A 3 mm obstructing calculus at the right ureterovesicular junction results in mild right hydronephrosis and hydroureter with moderate right perinephric stranding and inflammation. Diminished enhancement of the right renal parenchyma as well as    enhancement of the right renal pelvis and proximal right ureter suggest pyelonephritis. Correlate with urinalysis. 2. The appendix measures 7 mm in diameter and contains fluid however there is no periappendiceal fat stranding or inflammation to suggest acute appendicitis. 3. Limited examination secondary to patient motion. Chronic findings are discussed. IMPRESSION/Plan:    NPO  Consent  Abx  Symptom control    Right UVJ stone - 3mm obstructing right UVJ stone, mild hydro, hydronureter  UTI - Ua infected, Ux pending - on Zosyn  ?acute appendicitis - gen/surg to evaluate    Cystoscopy, possible right ureteroscopy,  basket retrieval of stone fragments, and right ureteral stent placement per Dr Chucky Espinoza. I described the procedure in detail and also described the associated risks and benefits at length. We discussed possible alternative therapies. We discussed the risks and benefits of not undergoing therapy. Patient understands these risks and benefits and desires to proceed. Post-op expectations were discussed; stent pain, urinary frequency and urgency secondary to the stent, dysuria which should improve 1-2 days after procedure, and intermittent hematuria can be expected as long as stent is in place.      Admit to medicine, ok to eat from Urology standpoint after surgery    Thank you for including us in the care of [address] [place] [address] DARI - CNP, DARI  01/26/23 10:01 AM  Urology

## 2023-01-26 NOTE — ED PROVIDER NOTES
[address] [facility] [address] [address] EMERGENCY DEPT      EMERGENCY MEDICINE     Pt Name: Romayne Berkeley  MRN: [id number]  [place] 2000  Date of evaluation: 1/26/2023  Provider: Rula Worthington DO, [place] COMPLAINT       Chief Complaint   Patient presents with    Emesis     HISTORY OF PRESENT ILLNESS   Romayne Berkeley is a pleasant 23 y.o. female who presents to the emergency department for evaluation of vomiting. States she passed a kidney stone yesterday but has not been able to stop vomiting since  4 episodes of nonbilious nonbloody emesis yesterday, 3 more today  Unsure about fever--\"I felt hot\"  RLQ abd pain yesterday that resolved and soon thereafter she states she passed a kidney stone  No dysuria or hematuria; no vaginal bleeding or dc. LMP 1/12/23 on time      PASTMEDICAL HISTORY/Co-Morbid Conditions:     Past Medical History:   Diagnosis Date    ADHD (attention deficit hyperactivity disorder)     Premature baby     32 weeks       Patient Active Problem List   Diagnosis Code    Abdominal pain R10.9     SURGICAL HISTORY       Past Surgical History:   Procedure Laterality Date    ACHILLES TENDON SURGERY      CHOLECYSTECTOMY, LAPAROSCOPIC N/A 7/16/2017    LAPAROSCOPY; OPEN CHOLECYSTECTOMY performed by Janell Madsen MD at [facility]      revision  shunt 1999    VENTRICULOPERITONEAL SHUNT         CURRENT MEDICATIONS       Previous Medications    No medications on file       ALLERGIES     has No Known Allergies. FAMILY HISTORY     has no family status information on file.         SOCIAL HISTORY       Social History     Tobacco Use    Smoking status: Never   Substance Use Topics    Alcohol use: No    Drug use: No       PHYSICAL EXAM       ED Triage Vitals [01/26/23 0744]   BP Temp Temp Source Heart Rate Resp SpO2 Height Weight   99/68 98.5 °F (36.9 °C) Oral (!) 129 18 97 % 5' 2\" (1.575 m) 225 lb (102.1 kg)       Additional Vital Signs:  Vitals:    01/26/23 1027   BP: (!) 105/55   Pulse: Payal Frias ) 146   Resp: 20   Temp:    SpO2: 97%     Physical Exam  Vitals and nursing note reviewed. Constitutional:       General: She is not in acute distress. Appearance: She is well-developed. She is not ill-appearing, toxic-appearing or diaphoretic. HENT:      Head: Normocephalic and atraumatic. Eyes:      General: No scleral icterus. Conjunctiva/sclera: Conjunctivae normal.      Right eye: Right conjunctiva is not injected. Left eye: Left conjunctiva is not injected. Pupils: Pupils are equal, round, and reactive to light. Neck:      Thyroid: No thyromegaly. Trachea: No tracheal deviation. Cardiovascular:      Rate and Rhythm: Regular rhythm. Tachycardia present. Heart sounds: Normal heart sounds. No murmur heard. No friction rub. No gallop. Pulmonary:      Effort: Pulmonary effort is normal. No respiratory distress. Breath sounds: Normal breath sounds. No stridor. No wheezing or rales. Abdominal:      General: Bowel sounds are normal. There is no distension. Palpations: Abdomen is soft. There is no mass. Tenderness: There is no abdominal tenderness. There is no guarding or rebound. Comments: Negative Sosa's sign  Nontender McBurney's Point  Negative Rovsig's sign  No bruising or echymosis of abdomen   Musculoskeletal:         General: No tenderness. Cervical back: Normal range of motion and neck supple. Comments: Bilat CVA tenderness    Negative Deni's Sign bilaterally   Lymphadenopathy:      Cervical: No cervical adenopathy. Skin:     General: Skin is warm and dry. Capillary Refill: Capillary refill takes less than 2 seconds. Coloration: Skin is not pale. Findings: No erythema or rash. Neurological:      Mental Status: She is alert and oriented to person, place, and time. Cranial Nerves: No cranial nerve deficit. Motor: No abnormal muscle tone.       Coordination: Coordination normal.      Comments: No nystagmus Psychiatric:         Behavior: Behavior normal.         Thought Content: Thought content normal.       FORMAL DIAGNOSTIC RESULTS     RADIOLOGY: Interpretation per the Radiologist below, if available at the time of this note (none if blank):    CT ABDOMEN PELVIS W IV CONTRAST Additional Contrast? None   Final Result   1. A 3 mm obstructing calculus at the right ureterovesicular junction results in mild right hydronephrosis and hydroureter with moderate right perinephric stranding and inflammation. Diminished enhancement of the right renal parenchyma as well as    enhancement of the right renal pelvis and proximal right ureter suggest pyelonephritis. Correlate with urinalysis. 2. The appendix measures 7 mm in diameter and contains fluid however there is no periappendiceal fat stranding or inflammation to suggest acute appendicitis. 3. Limited examination secondary to patient motion. Chronic findings are discussed. **This report has been created using voice recognition software. It may contain minor errors which are inherent in voice recognition technology. **      Final report electronically signed by Dr Cordell Hyaes on 1/26/2023 9:50 AM          LABS: (none if blank)  Labs Reviewed   CBC WITH AUTO DIFFERENTIAL - Abnormal; Notable for the following components:       Result Value    WBC 27.1 (*)     RDW-SD 45.1 (*)     Segs Absolute 24.2 (*)     Lymphocytes Absolute 0.9 (*)     Monocytes Absolute 1.5 (*)     Immature Grans (Abs) 0.39 (*)     All other components within normal limits   COMPREHENSIVE METABOLIC PANEL - Abnormal; Notable for the following components:    Glucose 151 (*)     Sodium 130 (*)     Chloride 96 (*)     CO2 21 (*)     AST 51 (*)     Total Bilirubin 1.7 (*)     All other components within normal limits   URINALYSIS WITH MICROSCOPIC - Abnormal; Notable for the following components:    Ketones, Urine TRACE (*)     Blood, Urine MODERATE (*)     Protein, UA 30 (*)     Leukocyte Esterase, Urine LARGE (*)     Character, Urine CLOUDY (*)     All other components within normal limits   OSMOLALITY - Abnormal; Notable for the following components:    Osmolality Calc 262.0 (*)     All other components within normal limits   CULTURE, BLOOD 1   CULTURE, BLOOD 2   LIPASE   HCG, SERUM, QUALITATIVE   ANION GAP   GLOMERULAR FILTRATION RATE, ESTIMATED   LACTIC ACID       (Any cultures that may have been sent were not resulted at the time of this patient visit)    [facility] / ED COURSE:   MDM  /  ED Course as of 01/26/23 1029   Thu Jan 26, 2023   0849 Noted WBC 27k ? L shift  Source at this time unclear  Will CT abd/pelvis and add lactic acid/blood cultures   [AB]   1010 Case discussed with Raquel Wilson, urology PA who seen the patient at the bedside, will be taking her to the OR with Dr. Karin Reyna urgently for a ureter decompression. Broad-spectrum antibiotics have been initiated, blood cultures have been drawn. She did spike a temp of 103 with a corresponding heart rate of 150. EKG is reviewed by myself. She did have some transiently low blood pressures in the mid 70s, however has been awake and alert through that. Current blood pressure is 105/55, MAP of 70. She is not requiring pressors.   Case is discussed with general surgery Dr. Johan Nicole given the findings of dilated fluid-filled appendix on the CT scan as well who feels that likely this is a urologic issue and he is available as a consult but does not feel that this is a general surgery related issue.  [AB]      ED Course User Index  [AB] Arabella Dawn DO     Vitals Reviewed:    Vitals:    01/26/23 0847 01/26/23 0848 01/26/23 0953 01/26/23 1027   BP: (!) 97/54  (!) 107/52 (!) 105/55   Pulse: (!) 130  (S) (!) 150 (!) 146   Resp: 20  20 20   Temp: 100.1 °F (37.8 °C)  (S) (!) 103 °F (39.4 °C)    TempSrc: Oral  Oral    SpO2: 100%  98% 97%   Weight:  203 lb (92.1 kg)     Height:           Differential Diagnosis includes (but not limited to):  Ureterolithiasis, pyelonephritis, sepsis, appendicitis, dehydration    My Independent interpretations: (See Formal Diagnostic Results above for the lab & radiology testing)  EKG:      Sinus tachycardia, rate of 154. Normal WV, QRS, QTc intervals. T wave inversions in V2 and V3  Imaging: CT reviewed, distal stone, right perinephric stranding  Labs:      Significant leukocytosis with a left shift    External Documentation:   Previous patient encounter documents & history available on EMR was reviewed previous visits        ED Medications administered this visit:  (None if blank)  Medications   cefTRIAXone (ROCEPHIN) 1,000 mg in sodium chloride 0.9 % 50 mL IVPB (mini-bag) (1,000 mg IntraVENous New Bag 1/26/23 1017)   0.9 % sodium chloride bolus (1,000 mLs IntraVENous New Bag 1/26/23 1029)   0.9 % sodium chloride bolus (0 mLs IntraVENous Stopped 1/26/23 0851)   ondansetron (ZOFRAN) injection 4 mg (4 mg IntraVENous Given 1/26/23 0756)   0.9 % sodium chloride bolus (0 mLs IntraVENous Stopped 1/26/23 1000)   iopamidol (ISOVUE-370) 76 % injection 80 mL (80 mLs IntraVENous Given 1/26/23 0932)   acetaminophen (TYLENOL) suppository 650 mg (650 mg Rectal Given 1/26/23 1017)         PROCEDURES: (None if blank)  Procedures:     CRITICAL CARE: (None if blank)  Critical Care  There was a high probability of life-threatening clinical deterioration in the patient's condition requiring my urgent intervention. Total critical care time with the patient was 45 minutes excluding separately reportable procedures. Critical care required due to patients presentation, necessitating rapid work-up of sepsis, identification of etiology, IV fluids, antibiotics, consult coordination and or coordination a comprehensive workup and frequent reassessments and monitoring      DISCHARGE PRESCRIPTIONS: (None if blank)  New Prescriptions    No medications on file       FINAL IMPRESSION      1. Obstruction of right ureter    2. Septicemia [facility])          DISPOSITION/PLAN   DISPOSITION Decision To Admit 01/26/2023 10:03:39 AM      OUTPATIENT FOLLOW UP THE PATIENT:  No follow-up provider specified.     DO Deepak Peralta DO  01/26/23 1032

## 2023-01-26 NOTE — ED NOTES
Provider at bedside discussing POC with pt. Pt verbalized understanding.  Informed consent form signed at this time     Maite George RN  01/26/23 5979

## 2023-01-26 NOTE — ED NOTES
Pt being transported to surgery via transport team at this time     Leighton Jeffries RN  01/26/23 4763

## 2023-01-26 NOTE — ED NOTES
ED to inpatient nurses report    Chief Complaint   Patient presents with    Emesis      Present to ED from home  LOC: alert and orientated to name, place, date  Vital signs   Vitals:    01/26/23 0847 01/26/23 0848 01/26/23 0953 01/26/23 1027   BP: (!) 97/54  (!) 107/52 (!) 105/55   Pulse: (!) 130  (S) (!) 150 (!) 146   Resp: 20  20 20   Temp: 100.1 °F (37.8 °C)  (S) (!) 103 °F (39.4 °C)    TempSrc: Oral  Oral    SpO2: 100%  98% 97%   Weight:  203 lb (92.1 kg)     Height:          Oxygen Baseline room iar     Current needs required none   LDAs:   Peripheral IV 01/26/23 Left Arm (Active)       Peripheral IV 01/26/23 Right Arm (Active)   Site Assessment Clean, dry & intact 01/26/23 1009   Phlebitis Assessment No symptoms 01/26/23 1009   Infiltration Assessment 0 01/26/23 1009     Mobility: Requires assistance * 1  Pending ED orders: none  Present condition: stable.      C-SSRS Risk of Suicide: No Risk  Swallow Screening    Preferred Language: English     Electronically signed by Morales Fraser RN on 1/26/2023 at 10:33 AM     Morales Fraser RN  01/26/23 6233

## 2023-01-26 NOTE — PROGRESS NOTES
1245- pt to pacu, resp easy and unlabored, VSS, pt educated on bladder being drained and stent placed on right side, pt placed on bedpan per pt's request, pt denies pain, pt appears in no acute distress  1250- spoke with anesthesia due to tachycardia and low blood pressure, no orders received   1255- pt resting in bed with eyes closed, resp easy and unlabored, VSS, pt appears in no acute distress, pt denies pain  1303- pt remains in stable condition, pt temp increase from 99.1 to 100.8, pt denies pain, pt appears in no acute distress  1315- Report called to [address] on [address]  1320- pt meets criteria for discharge from pacu, pt awaiting in patient transport  1324- pt transported to floor by transport staff

## 2023-01-27 ENCOUNTER — TELEPHONE (OUTPATIENT)
Dept: UROLOGY | Age: 23
End: 2023-01-27

## 2023-01-27 ENCOUNTER — APPOINTMENT (OUTPATIENT)
Dept: CT IMAGING | Age: 23
DRG: 720 | End: 2023-01-27
Payer: MEDICAID

## 2023-01-27 DIAGNOSIS — N20.0 NEPHROLITHIASIS: Primary | ICD-10-CM

## 2023-01-27 DIAGNOSIS — Z01.818 PRE-OP TESTING: ICD-10-CM

## 2023-01-27 PROBLEM — R00.0 TACHYCARDIA: Status: ACTIVE | Noted: 2023-01-27

## 2023-01-27 PROBLEM — I95.89 HYPOTENSION DUE TO HYPOVOLEMIA: Status: ACTIVE | Noted: 2023-01-27

## 2023-01-27 PROBLEM — E83.42 HYPOMAGNESEMIA: Status: ACTIVE | Noted: 2023-01-27

## 2023-01-27 PROBLEM — E86.1 HYPOTENSION DUE TO HYPOVOLEMIA: Status: ACTIVE | Noted: 2023-01-27

## 2023-01-27 PROBLEM — E87.1 HYPONATREMIA: Status: ACTIVE | Noted: 2023-01-27

## 2023-01-27 PROBLEM — A41.9 SEPTICEMIA (HCC): Status: ACTIVE | Noted: 2023-01-27

## 2023-01-27 PROBLEM — N12 PYELONEPHRITIS: Status: ACTIVE | Noted: 2023-01-27

## 2023-01-27 PROBLEM — R19.7 DIARRHEA: Status: ACTIVE | Noted: 2023-01-27

## 2023-01-27 LAB
ALBUMIN SERPL BCG-MCNC: 2.7 G/DL (ref 3.5–5.1)
ALBUMIN SERPL BCG-MCNC: 2.8 G/DL (ref 3.5–5.1)
ALP SERPL-CCNC: 75 U/L (ref 38–126)
ALP SERPL-CCNC: 81 U/L (ref 38–126)
ALT SERPL W/O P-5'-P-CCNC: 27 U/L (ref 11–66)
ALT SERPL W/O P-5'-P-CCNC: 34 U/L (ref 11–66)
ANION GAP SERPL CALC-SCNC: 11 MEQ/L (ref 8–16)
ANION GAP SERPL CALC-SCNC: 9 MEQ/L (ref 8–16)
APTT PPP: 22.9 SECONDS (ref 22–38)
AST SERPL-CCNC: 19 U/L (ref 5–40)
AST SERPL-CCNC: 27 U/L (ref 5–40)
B PERT DNA NPH QL NAA+PROBE: NOT DETECTED
BASE EXCESS BLDA CALC-SCNC: -0.3 MMOL/L (ref -2–3)
BASE EXCESS BLDA CALC-SCNC: -0.5 MMOL/L (ref -2–3)
BASOPHILS ABSOLUTE: 0 THOU/MM3 (ref 0–0.1)
BASOPHILS ABSOLUTE: 0.1 THOU/MM3 (ref 0–0.1)
BASOPHILS NFR BLD AUTO: 0.2 %
BASOPHILS NFR BLD AUTO: 0.3 %
BILIRUB SERPL-MCNC: 0.9 MG/DL (ref 0.3–1.2)
BILIRUB SERPL-MCNC: 1.2 MG/DL (ref 0.3–1.2)
BORDETELLA PARAPERTUSSIS BY PCR: NOT DETECTED
BUN SERPL-MCNC: 6 MG/DL (ref 7–22)
BUN SERPL-MCNC: 7 MG/DL (ref 7–22)
C PNEUM DNA SPEC QL NAA+PROBE: NOT DETECTED
CA-I BLD ISE-SCNC: 1.02 MMOL/L (ref 1.12–1.32)
CALCIUM SERPL-MCNC: 7.5 MG/DL (ref 8.5–10.5)
CALCIUM SERPL-MCNC: 8.2 MG/DL (ref 8.5–10.5)
CHLORIDE SERPL-SCNC: 105 MEQ/L (ref 98–111)
CHLORIDE SERPL-SCNC: 107 MEQ/L (ref 98–111)
CO2 SERPL-SCNC: 21 MEQ/L (ref 23–33)
CO2 SERPL-SCNC: 22 MEQ/L (ref 23–33)
COLLECTED BY:: ABNORMAL
COLLECTED BY:: NORMAL
CREAT SERPL-MCNC: 1 MG/DL (ref 0.4–1.2)
CREAT SERPL-MCNC: 1 MG/DL (ref 0.4–1.2)
DEPRECATED RDW RBC AUTO: 46.8 FL (ref 35–45)
DEPRECATED RDW RBC AUTO: 48.1 FL (ref 35–45)
DEVICE: NORMAL
EKG ATRIAL RATE: 133 BPM
EKG ATRIAL RATE: 154 BPM
EKG P AXIS: 32 DEGREES
EKG P AXIS: 45 DEGREES
EKG P-R INTERVAL: 134 MS
EKG P-R INTERVAL: 142 MS
EKG Q-T INTERVAL: 246 MS
EKG Q-T INTERVAL: 278 MS
EKG QRS DURATION: 76 MS
EKG QRS DURATION: 84 MS
EKG QTC CALCULATION (BAZETT): 394 MS
EKG QTC CALCULATION (BAZETT): 413 MS
EKG R AXIS: 73 DEGREES
EKG R AXIS: 76 DEGREES
EKG T AXIS: 11 DEGREES
EKG T AXIS: 62 DEGREES
EKG VENTRICULAR RATE: 133 BPM
EKG VENTRICULAR RATE: 154 BPM
EOSINOPHIL NFR BLD AUTO: 0.1 %
EOSINOPHIL NFR BLD AUTO: 0.2 %
EOSINOPHILS ABSOLUTE: 0 THOU/MM3 (ref 0–0.4)
EOSINOPHILS ABSOLUTE: 0.1 THOU/MM3 (ref 0–0.4)
ERYTHROCYTE [DISTWIDTH] IN BLOOD BY AUTOMATED COUNT: 14.5 % (ref 11.5–14.5)
ERYTHROCYTE [DISTWIDTH] IN BLOOD BY AUTOMATED COUNT: 14.6 % (ref 11.5–14.5)
FLUAV RNA NPH QL NAA+PROBE: NOT DETECTED
FLUBV RNA NPH QL NAA+PROBE: NOT DETECTED
GFR SERPL CREATININE-BSD FRML MDRD: > 60 ML/MIN/1.73M2
GFR SERPL CREATININE-BSD FRML MDRD: > 60 ML/MIN/1.73M2
GLUCOSE SERPL-MCNC: 123 MG/DL (ref 70–108)
GLUCOSE SERPL-MCNC: 99 MG/DL (ref 70–108)
HADV DNA NPH QL NAA+PROBE: NOT DETECTED
HCO3 BLDA-SCNC: 22 MMOL/L (ref 23–28)
HCO3 BLDA-SCNC: 25 MMOL/L (ref 23–28)
HCOV 229E RNA SPEC QL NAA+PROBE: NOT DETECTED
HCOV HKU1 RNA SPEC QL NAA+PROBE: NOT DETECTED
HCOV NL63 RNA SPEC QL NAA+PROBE: NOT DETECTED
HCOV OC43 RNA SPEC QL NAA+PROBE: NOT DETECTED
HCT VFR BLD AUTO: 34.5 % (ref 37–47)
HCT VFR BLD AUTO: 37.5 % (ref 37–47)
HGB BLD-MCNC: 11.1 GM/DL (ref 12–16)
HGB BLD-MCNC: 12.6 GM/DL (ref 12–16)
HMPV RNA NPH QL NAA+PROBE: NOT DETECTED
HPIV1 RNA NPH QL NAA+PROBE: NOT DETECTED
HPIV2 RNA NPH QL NAA+PROBE: NOT DETECTED
HPIV3 RNA NPH QL NAA+PROBE: NOT DETECTED
HPIV4 RNA NPH QL NAA+PROBE: NOT DETECTED
IMM GRANULOCYTES # BLD AUTO: 0.08 THOU/MM3 (ref 0–0.07)
IMM GRANULOCYTES # BLD AUTO: 0.17 THOU/MM3 (ref 0–0.07)
IMM GRANULOCYTES NFR BLD AUTO: 0.4 %
IMM GRANULOCYTES NFR BLD AUTO: 0.7 %
INR PPP: 1.33 (ref 0.85–1.13)
L PNEUMO1 AG UR QL IA.RAPID: NEGATIVE
LACTATE SERPL-SCNC: 0.9 MMOL/L (ref 0.5–2)
LACTATE SERPL-SCNC: 1.3 MMOL/L (ref 0.5–2)
LACTATE SERPL-SCNC: 1.3 MMOL/L (ref 0.5–2)
LACTATE SERPL-SCNC: 1.9 MMOL/L (ref 0.5–2)
LV EF: 55 %
LVEF MODALITY: NORMAL
LYMPHOCYTES ABSOLUTE: 1.2 THOU/MM3 (ref 1–4.8)
LYMPHOCYTES ABSOLUTE: 1.9 THOU/MM3 (ref 1–4.8)
LYMPHOCYTES NFR BLD AUTO: 10.2 %
LYMPHOCYTES NFR BLD AUTO: 4.7 %
M PNEUMO DNA SPEC QL NAA+PROBE: NOT DETECTED
MAGNESIUM SERPL-MCNC: 1.3 MG/DL (ref 1.6–2.4)
MAGNESIUM SERPL-MCNC: 2.1 MG/DL (ref 1.6–2.4)
MCH RBC QN AUTO: 29.1 PG (ref 26–33)
MCH RBC QN AUTO: 29.9 PG (ref 26–33)
MCHC RBC AUTO-ENTMCNC: 32.2 GM/DL (ref 32.2–35.5)
MCHC RBC AUTO-ENTMCNC: 33.6 GM/DL (ref 32.2–35.5)
MCV RBC AUTO: 88.9 FL (ref 81–99)
MCV RBC AUTO: 90.3 FL (ref 81–99)
MONOCYTES ABSOLUTE: 1 THOU/MM3 (ref 0.4–1.3)
MONOCYTES ABSOLUTE: 1.3 THOU/MM3 (ref 0.4–1.3)
MONOCYTES NFR BLD AUTO: 5.1 %
MONOCYTES NFR BLD AUTO: 5.4 %
NEUTROPHILS NFR BLD AUTO: 83.7 %
NEUTROPHILS NFR BLD AUTO: 89 %
NRBC BLD AUTO-RTO: 0 /100 WBC
NRBC BLD AUTO-RTO: 0 /100 WBC
PCO2 TEMP ADJ BLDMV: 30 MMHG (ref 41–51)
PCO2 TEMP ADJ BLDMV: 43 MMHG (ref 41–51)
PH BLDMV: 7.37 [PH] (ref 7.31–7.41)
PH BLDMV: 7.48 [PH] (ref 7.31–7.41)
PLATELET # BLD AUTO: 198 THOU/MM3 (ref 130–400)
PLATELET # BLD AUTO: 213 THOU/MM3 (ref 130–400)
PLATELET BLD QL SMEAR: ADEQUATE
PMV BLD AUTO: 12.1 FL (ref 9.4–12.4)
PMV BLD AUTO: 12.5 FL (ref 9.4–12.4)
PO2 BLDMV: 30 MMHG (ref 25–40)
PO2 BLDMV: 66 MMHG (ref 25–40)
POTASSIUM SERPL-SCNC: 3.6 MEQ/L (ref 3.5–5.2)
POTASSIUM SERPL-SCNC: 3.7 MEQ/L (ref 3.5–5.2)
PROT SERPL-MCNC: 4.9 G/DL (ref 6.1–8)
PROT SERPL-MCNC: 5 G/DL (ref 6.1–8)
RBC # BLD AUTO: 3.82 MILL/MM3 (ref 4.2–5.4)
RBC # BLD AUTO: 4.22 MILL/MM3 (ref 4.2–5.4)
RSV RNA NPH QL NAA+PROBE: NOT DETECTED
RV+EV RNA SPEC QL NAA+PROBE: NOT DETECTED
SAO2 % BLDMV: 56 %
SAO2 % BLDMV: 94 %
SARS-COV-2 RNA NPH QL NAA+NON-PROBE: NOT DETECTED
SCAN OF BLOOD SMEAR: NORMAL
SEGMENTED NEUTROPHILS ABSOLUTE COUNT: 15.6 THOU/MM3 (ref 1.8–7.7)
SEGMENTED NEUTROPHILS ABSOLUTE COUNT: 22.3 THOU/MM3 (ref 1.8–7.7)
SODIUM SERPL-SCNC: 137 MEQ/L (ref 135–145)
SODIUM SERPL-SCNC: 138 MEQ/L (ref 135–145)
STREP PNEUMO AG, UR: NEGATIVE
WBC # BLD AUTO: 18.6 THOU/MM3 (ref 4.8–10.8)
WBC # BLD AUTO: 25.1 THOU/MM3 (ref 4.8–10.8)

## 2023-01-27 PROCEDURE — 6360000002 HC RX W HCPCS: Performed by: PHYSICIAN ASSISTANT

## 2023-01-27 PROCEDURE — 6360000004 HC RX CONTRAST MEDICATION

## 2023-01-27 PROCEDURE — 2140000000 HC CCU INTERMEDIATE R&B

## 2023-01-27 PROCEDURE — 83605 ASSAY OF LACTIC ACID: CPT

## 2023-01-27 PROCEDURE — 74177 CT ABD & PELVIS W/CONTRAST: CPT

## 2023-01-27 PROCEDURE — 99232 SBSQ HOSP IP/OBS MODERATE 35: CPT | Performed by: UROLOGY

## 2023-01-27 PROCEDURE — 87040 BLOOD CULTURE FOR BACTERIA: CPT

## 2023-01-27 PROCEDURE — 6370000000 HC RX 637 (ALT 250 FOR IP)

## 2023-01-27 PROCEDURE — 36600 WITHDRAWAL OF ARTERIAL BLOOD: CPT

## 2023-01-27 PROCEDURE — 87899 AGENT NOS ASSAY W/OPTIC: CPT

## 2023-01-27 PROCEDURE — 80053 COMPREHEN METABOLIC PANEL: CPT

## 2023-01-27 PROCEDURE — 99233 SBSQ HOSP IP/OBS HIGH 50: CPT

## 2023-01-27 PROCEDURE — 6360000002 HC RX W HCPCS

## 2023-01-27 PROCEDURE — 2580000003 HC RX 258

## 2023-01-27 PROCEDURE — 85730 THROMBOPLASTIN TIME PARTIAL: CPT

## 2023-01-27 PROCEDURE — 36415 COLL VENOUS BLD VENIPUNCTURE: CPT

## 2023-01-27 PROCEDURE — 87086 URINE CULTURE/COLONY COUNT: CPT

## 2023-01-27 PROCEDURE — 6360000004 HC RX CONTRAST MEDICATION: Performed by: PHYSICIAN ASSISTANT

## 2023-01-27 PROCEDURE — 71275 CT ANGIOGRAPHY CHEST: CPT

## 2023-01-27 PROCEDURE — 87449 NOS EACH ORGANISM AG IA: CPT

## 2023-01-27 PROCEDURE — 0202U NFCT DS 22 TRGT SARS-COV-2: CPT

## 2023-01-27 PROCEDURE — 83735 ASSAY OF MAGNESIUM: CPT

## 2023-01-27 PROCEDURE — 93306 TTE W/DOPPLER COMPLETE: CPT

## 2023-01-27 PROCEDURE — 85610 PROTHROMBIN TIME: CPT

## 2023-01-27 PROCEDURE — 99222 1ST HOSP IP/OBS MODERATE 55: CPT | Performed by: SURGERY

## 2023-01-27 PROCEDURE — 82330 ASSAY OF CALCIUM: CPT

## 2023-01-27 PROCEDURE — 85025 COMPLETE CBC W/AUTO DIFF WBC: CPT

## 2023-01-27 PROCEDURE — 2580000003 HC RX 258: Performed by: PHYSICIAN ASSISTANT

## 2023-01-27 PROCEDURE — 82803 BLOOD GASES ANY COMBINATION: CPT

## 2023-01-27 RX ORDER — SODIUM CHLORIDE 9 MG/ML
INJECTION, SOLUTION INTRAVENOUS CONTINUOUS
Status: DISCONTINUED | OUTPATIENT
Start: 2023-01-27 | End: 2023-01-30

## 2023-01-27 RX ORDER — POTASSIUM CHLORIDE 7.45 MG/ML
10 INJECTION INTRAVENOUS PRN
Status: DISCONTINUED | OUTPATIENT
Start: 2023-01-27 | End: 2023-02-01 | Stop reason: HOSPADM

## 2023-01-27 RX ORDER — POTASSIUM CHLORIDE 20 MEQ/1
40 TABLET, EXTENDED RELEASE ORAL PRN
Status: DISCONTINUED | OUTPATIENT
Start: 2023-01-27 | End: 2023-02-01 | Stop reason: HOSPADM

## 2023-01-27 RX ORDER — CALCIUM GLUCONATE 20 MG/ML
2000 INJECTION, SOLUTION INTRAVENOUS ONCE
Status: COMPLETED | OUTPATIENT
Start: 2023-01-27 | End: 2023-01-27

## 2023-01-27 RX ORDER — MAGNESIUM SULFATE IN WATER 40 MG/ML
4000 INJECTION, SOLUTION INTRAVENOUS ONCE
Status: COMPLETED | OUTPATIENT
Start: 2023-01-27 | End: 2023-01-27

## 2023-01-27 RX ORDER — MAGNESIUM SULFATE IN WATER 40 MG/ML
2000 INJECTION, SOLUTION INTRAVENOUS PRN
Status: DISCONTINUED | OUTPATIENT
Start: 2023-01-27 | End: 2023-02-01 | Stop reason: HOSPADM

## 2023-01-27 RX ORDER — 0.9 % SODIUM CHLORIDE 0.9 %
1000 INTRAVENOUS SOLUTION INTRAVENOUS ONCE
Status: COMPLETED | OUTPATIENT
Start: 2023-01-27 | End: 2023-01-27

## 2023-01-27 RX ORDER — LACTOBACILLUS RHAMNOSUS GG 10B CELL
1 CAPSULE ORAL
Status: DISCONTINUED | OUTPATIENT
Start: 2023-01-27 | End: 2023-02-01 | Stop reason: HOSPADM

## 2023-01-27 RX ADMIN — Medication 1 CAPSULE: at 12:48

## 2023-01-27 RX ADMIN — PIPERACILLIN AND TAZOBACTAM 4500 MG: 4; .5 INJECTION, POWDER, FOR SOLUTION INTRAVENOUS at 10:28

## 2023-01-27 RX ADMIN — PIPERACILLIN AND TAZOBACTAM 3375 MG: 3; .375 INJECTION, POWDER, FOR SOLUTION INTRAVENOUS at 16:37

## 2023-01-27 RX ADMIN — SODIUM CHLORIDE 1000 ML: 9 INJECTION, SOLUTION INTRAVENOUS at 03:43

## 2023-01-27 RX ADMIN — ONDANSETRON 4 MG: 2 INJECTION INTRAMUSCULAR; INTRAVENOUS at 01:53

## 2023-01-27 RX ADMIN — SODIUM CHLORIDE: 9 INJECTION, SOLUTION INTRAVENOUS at 16:36

## 2023-01-27 RX ADMIN — ENOXAPARIN SODIUM 40 MG: 100 INJECTION SUBCUTANEOUS at 20:03

## 2023-01-27 RX ADMIN — MAGNESIUM SULFATE HEPTAHYDRATE 4000 MG: 40 INJECTION, SOLUTION INTRAVENOUS at 05:32

## 2023-01-27 RX ADMIN — Medication 1 CAPSULE: at 16:38

## 2023-01-27 RX ADMIN — SODIUM CHLORIDE, PRESERVATIVE FREE 10 ML: 5 INJECTION INTRAVENOUS at 00:27

## 2023-01-27 RX ADMIN — CALCIUM GLUCONATE 2000 MG: 20 INJECTION, SOLUTION INTRAVENOUS at 12:49

## 2023-01-27 RX ADMIN — VANCOMYCIN HYDROCHLORIDE 1500 MG: 5 INJECTION, POWDER, LYOPHILIZED, FOR SOLUTION INTRAVENOUS at 18:47

## 2023-01-27 RX ADMIN — IOPAMIDOL 80 ML: 755 INJECTION, SOLUTION INTRAVENOUS at 01:42

## 2023-01-27 RX ADMIN — ACETAMINOPHEN 650 MG: 325 TABLET ORAL at 14:20

## 2023-01-27 RX ADMIN — IOPAMIDOL 80 ML: 755 INJECTION, SOLUTION INTRAVENOUS at 10:59

## 2023-01-27 RX ADMIN — ONDANSETRON 4 MG: 2 INJECTION INTRAMUSCULAR; INTRAVENOUS at 14:20

## 2023-01-27 NOTE — CONSULTS
[facility] Surgery Consultation - Meka Ross MD    Pt Name: Maria De Jesus Mendiola  MRN: [id number]  [place]: 2000  Date of evaluation: 1/27/2023  Primary Care Physician: DARI Verma CNP  Patient evaluated at the request of  FABI Fuentes  Reason for evaluation: abdominal pain  IMPRESSIONS:   Fever chills abdominal pain  Sepsis secondary to urinary obstruction  Normal-appearing appendix on initial CT scan  Leukocytosis   shunt  ADHD   has a past medical history of ADHD (attention deficit hyperactivity disorder) and Premature baby. RECOMMENDATIONS:   Repeat CT imaging  Clinically I doubt appendicitis or acute general surgical process. Will await results of follow-up imaging. Keep n.p.o. for now  Broad-spectrum antibiotics  Hold chemical VTE prophylaxis for now  SUBJECTIVE:   History of Chief Complaint:    David Macias is a 23 y. o.female who presents with abdominal pain. She was admitted to the hospitalist service yesterday with sepsis and evidence of an obstructing stone at the right ureterovesical junction. CT imaging showed a 7 mm appendix which was fluid-filled which is normal.  There was no periappendiceal stranding. ED contacted general surgery who was on-call at the time they did not feel this was a surgical process and I would agree. She was taken yesterday by urology and had a cystoscopy and a stent. Max Lacks was not documented as retrieved. But there is an order for stone analysis in the computer it is not well documented. There was flow of urine around the stent. She is continuing to have fevers and an elevated white count at 25,000 but it is trending downward. She reports some abdominal pain and is tender right lateral abdomen. This can all still be consistent with her urologic disease. I have recommended a follow-up CT scan. Patient is not very good about giving her own history she has a history of ADHD as well as a  shunt.   Lactic acid is normal.  CO2 stable at 21 on serologies. She had her gallbladder removed in 2017. Temperature was 102 this morning. She has been consistently tachycardic in the 120s. Blood cultures have shown no growth in 24 hours. Past Medical History   has a past medical history of ADHD (attention deficit hyperactivity disorder) and Premature baby. Past Surgical History   has a past surgical history that includes Ventriculoperitoneal shunt; other surgical history; Achilles tendon surgery; Cholecystectomy, laparoscopic (N/A, 7/16/2017); and Ureter surgery (Right, 1/26/2023). Medications  Prior to Admission medications    Not on File    Scheduled Meds:   calcium replacement protocol   Other RX Placeholder    piperacillin-tazobactam  4,500 mg IntraVENous Once    Followed by    piperacillin-tazobactam  3,375 mg IntraVENous Q8H    sodium chloride flush  10 mL IntraVENous 2 times per day    enoxaparin  40 mg SubCUTAneous Q24H     Continuous Infusions:   lactated ringers IV soln 100 mL/hr at 01/27/23 1017    sodium chloride       PRN Meds:.magnesium sulfate, potassium chloride **OR** potassium alternative oral replacement **OR** potassium chloride, sodium chloride flush, sodium chloride, ondansetron **OR** ondansetron, polyethylene glycol, acetaminophen **OR** acetaminophen  Allergies  has No Known Allergies. Family History  family history is not on file. Social History   reports that she has never smoked. She does not have any smokeless tobacco history on file. She reports that she does not drink alcohol and does not use drugs. Review of Systems  This patient multiple question she was not really answering. On her way to CT scan only complaint on extensive review of systems, 13 point was abdominal pain and I just do not feel great  SUBJECTIVE:   CURRENT VITALS:  height is 5' 2\" (1.575 m) and weight is 203 lb (92.1 kg). Her oral temperature is 102.1 °F (38.9 °C) (abnormal).  Her blood pressure is 106/56 (abnormal) and her pulse is 125 (abnormal). Her respiration is 21 and oxygen saturation is 94%. Body mass index is 37.13 kg/m².   Temperature Range (24h):Temp: (!) 102.1 °F (38.9 °C) Temp  Av.3 °F (37.9 °C)  Min: 98.6 °F (37 °C)  Max: 102.1 °F (38.9 °C)  BP Range (54Q): Systolic (01WXF), JYP:809 , Min:88 , WXC:657     Diastolic (36LLS), NRK:58, Min:50, Max:74    Pulse Range (24h): Pulse  Av.7  Min: 119  Max: 156  Respiration Range (24h): Resp  Av.3  Min: 20  Max: 28  Current Pulse Ox (24h):  SpO2: 94 %  Pulse Ox Range (24h):  SpO2  Av %  Min: 88 %  Max: 100 %  Oxygen Amount and Delivery: O2 Flow Rate (L/min): 2 L/min  CONSTITUTIONAL: Awake alert  SKIN: Not jaundiced warm good turgor  LYMPH: No palpable adenopathy  HEENT: Glasses, pupils equal, sclera anicteric  NECK: No visible jugular venous distention  CHEST/LUNGS: Clear no audible wheezing  CARDIOVASCULAR: Tachycardic   ABDOMEN: Soft without guarding or palpable mass subjective tenderness to palpation right lateral abdomen, again no guarding or rebound  RECTAL: Deferred  NEUROLOGIC: Awake moves extremities  EXTREMITIES: No gross edema  LABS:     Recent Labs     23  0800 23  0855 23  0901 23  1351 23  0027 23  0338 23  0854   WBC 27.1*  --   --   --   --   --  25.1*  --    HGB 13.9  --   --   --   --   --  12.6  --    HCT 41.5  --   --   --   --   --  37.5  --      --   --   --   --   --  213  --    *  --   --  136  --   --  137  --    K 3.6  --   --  3.7  --   --  3.7  --    CL 96*  --   --  104  --   --  105  --    CO2 21*  --   --  20*  --   --  21*  --    BUN 8  --   --  7  --   --  7  --    CREATININE 1.1  --   --  1.1  --   --  1.0  --    MG  --   --   --   --   --   --  1.3*  --    CALCIUM 8.7  --   --  7.3*  --   --  7.5*  --    INR  --   --   --  1.68*  --   --   --   --    AST 51*  --   --   --   --   --  27  --    ALT 52  --   --   --   --   --  34  --    BILITOT 1.7*  --   --   --   --   -- 1.2  --    LIPASE 13.7  --   --   --   --   --   --   --    LACTA  --   --    < >  --    < > 1.9 1.3 1.3   NITRU  --  NEGATIVE  --   --   --   --   --   --    COLORU  --  YELLOW  --   --   --   --   --   --    BACTERIA  --  FEW  --   --   --   --   --   --     < > = values in this interval not displayed. RADIOLOGY:     CTA CHEST W WO CONTRAST   Final Result   Impression:   Evaluation of the by motion   1. No pulmonary emboli. 2. No thoracic aortic aneurysm or dissection   3. Bilateral perihilar groundglass opacities may represent pulmonary edema    or atypical pneumonia. No focal area of consolidation      This document has been electronically signed by: Celina Henao MD on    01/27/2023 02:19 AM      All CTs at this facility use dose modulation techniques and iterative    reconstructions, and/or weight-based dosing   when appropriate to reduce radiation to a low as reasonably achievable. 3D Post-processing was performed on this study. XR CHEST (2 VW)   Final Result      Left lower lung atelectasis/infiltrate. **This report has been created using voice recognition software. It may contain minor errors which are inherent in voice recognition technology. **      Final report electronically signed by Dr. Yashira Pelletier on 1/26/2023 6:21 PM      [facility] FOR SURGICAL PROCEDURES   Final Result      CT ABDOMEN PELVIS W IV CONTRAST Additional Contrast? None   Final Result   1. A 3 mm obstructing calculus at the right ureterovesicular junction results in mild right hydronephrosis and hydroureter with moderate right perinephric stranding and inflammation. Diminished enhancement of the right renal parenchyma as well as    enhancement of the right renal pelvis and proximal right ureter suggest pyelonephritis. Correlate with urinalysis. 2. The appendix measures 7 mm in diameter and contains fluid however there is no periappendiceal fat stranding or inflammation to suggest acute appendicitis. 3. Limited examination secondary to patient motion. Chronic findings are discussed. **This report has been created using voice recognition software. It may contain minor errors which are inherent in voice recognition technology. **      Final report electronically signed by Dr Estrella Gomez on 1/26/2023 9:50 AM      CT ABDOMEN PELVIS W IV CONTRAST Additional Contrast? None    (Results Pending)       Imaging reviewed    Electronically signed by Meryle Blade, MD on 1/27/2023 at 10:55 AM

## 2023-01-27 NOTE — PROGRESS NOTES
Report receved from [facility]. Patient arrived per bed to 3B. Heart monitor applied and vitals taken. Admission paperwork completed. Explained to patient that [facility] is not responsible for any lost or stolen items. Patient verbalized understanding. Oriented to room and use of call light and bed controls. Patient denies pain or needs. No signs of distress noted. Bed locked & in low position, side-rails up x2. Call light in reach. Reminded patient to call nurse if any needs arise. 2 person skin assessment performed by this nurse and Meryl DAVIS.

## 2023-01-27 NOTE — PROGRESS NOTES
Hospitalist Progress Note    Patient:  Carlos A Arrow      Unit/Bed:3B-37/037-A    YOB: 2000    MRN: [id number]       Acct: [de-identified]     PCP: DARI Pollock CNP    Date of Admission: 1/26/2023    Assessment/Plan:    Sepsis secondary to pyelonephritis  Met 4/4 SIRS criteria (febrile 103, tachycardic 150 bpm, tachypneic 28 RRR, leukocytosis 27.1)  Organ Dysfunction: Acute hypoxic respiratory failure  Lactic acid level 1.8 (POA), has trended down and remained normal.  Received 30 cc/kg IV sepsis fluid boluses plus continuous IVF's  Received Zosyn and Rocephin in ED. Continue Zosyn (initiated 1/26)  Start Vanc as patient continues to be tachycardic and have elevated fevers > pharmacy to dose. BC x 2 preliminary result NGTD    Obstructing nephrolithiasis  CT shows a 3mm stone at the ureterovesicular junction resultatnt mild right hydronephrosis and right hydroureter. Urology consulted in ED. S/p cystoscopy with right stent insertion 1/26/23 by Dr. Aileen Wolff. Urology following    Pyelonephritis  CT shows right perinephric stranding and inflammation with diminished enhancement of right renal parenchyma and renal pelvis. Received zosyn and ceftriaxone in the ER. Continue Ceftriaxone and continue to monitor. UA notable for large LE, negative nitrite, moderate blood, 30 protein, trace ketones, greater than 200 WBCs, few bacteria. Urine culture pending  Continue Zosyn and Vanc    Acute hypoxic respiratory failure  And had hypoxic desaturation 88% on 1/26. Was placed on 2 L nasal cannula with subsequent improvement.   Continue to wean oxygen as tolerated  CXR (POA) notable for left lower lung atelectasis/infiltrate  CTA chest negative for PE, no thoracic aortic aneurysm or dissection, bilateral perihilar groundglass opacities may represent pulmonary edema versus atypical pneumonia  Repeat CT A/P showing small bilateral pleural effusions and patchy bilateral lower lobe airspace opacities can be clinically correlated for pneumonia  Initial VBG normal.  Repeat VBG showing pH 7.48, PCO2 30, PO2 66, HCO3 22 > primary respiratory alkalosis with metabolic acidosis compensation > continue to monitor  Patient denies cough, lungs clear to auscultation bilaterally. Suspect opacities likely from SIRS. Differentials also include possible aspiration. Respiratory viral panel negative  Strep pneumoniae and Legionella urine antigen assay test ordered  Care pneumonia panel ordered, respiratory culture ordered  Continue IV antibiotics No. 1  Pulmonary toilet: Incentive spirometer, Acapella    Leukocytosis:   Secondary to above. Trending down from 27.1 (POA). Continue IV abx. Hypotension  Patient BP noted to be 88/50 this morning. Her IVF were held yesterday. Restart aggressive IV fluid hydration  Monitor strict I&O's    Sinus tachycardia  ECG (POA) notable for sinus tachycardia, with nonspecific ST abnormality in V2 through V3, without ischemic change  Echo notable for EF 55%, mild MR, trivial CA  Troponin negative  Suspect likely secondary to sepsis, dehydration, being febrile. Aggressive IV fluid administration. Continuous telemetry    Bilateral pleural effusions  Noted to be small on CT A/P. Lung sounds clear. Continue aggressive IV fluid hydration as patient hypotensive, tachycardic. Will consider diuresing when able. Possible appendicitis-ruled out  Initial CT shows 7 mm in diameter appendix contains fluid, no periappendiceal fat stranding. ER discussed with gen/surg. Gen/surg feels an acute appendicitis is unlikely at this time. Patient then had worsening RLQ pain on 1/27 with high fevers, + Rovsings, and Ileopsoas tests. Gen surg consulted and order for repeat CT placed. Repeat CT A/P showing normal appendix. Hyponatremia (POA)  130 on admission. Likely 2/2 hypovolemia hyponatremia from emesis. IVF bolus and infusion.   Urine sodium 46, however this was obtained after receiving large amounts of IV fluids, not accurate measure at this time  Daily BMP  Continues IV fluids for hydration    Diarrhea  Per report from patient and her mother she has been having multiple episodes prior to arrival to hospital.  Patient continues to have multiple small diarrheal bowel movements here in hospital.  Molecular GI panel ordered. Aggressive IV fluid hydration    History of hydrocephalus status post  shunt:   Noted per chart review, sequelae of prematurity. Hypomagnesemia:   Magnesium replacement protocol. Daily magnesium level    Urinary incontinence:   Patient noted to be incontinent of urine multiple times on admission. Could be from pyelonephritis however did have formal discussion with patient's mother who reports patient is always incontinent. Dill catheter placed for strict I&O given severe sepsis and needing to monitor kidney function. Expected discharge date: Pending clinical course    Disposition:    [x] Home       [] TCU       [] Rehab       [] Psych       [] SNF       [] [place]       [] Other-    Chief Complaint: Emesis    Hospital Course: Per HPI documented 1/26/23: Sorin Machado is a 23 y.o. female with PMHx of hydrocephalus and prematurity who presented to [facility] with chief complaint of emesis. Patient reports she started vomiting yesterday with no associated nausea. States she could not keep anything down and continued to vomit today prompting her to come to the ER. She reports no associated pain with the vomiting. States she does not have abdominal pain, no urinary burning/discomfort/hesitancy/ frequency. States she does feel chilled and lightheaded with standing. Denies any headache, chest pain, palpitations, shortness of breath, and abdominal pain. \"    1/27/23: Resumed care patient today. Patient febrile, tachycardic, hypotensive. She is resting in bed, washing herself up with washcloth.   Complains of 10/10 right lower quadrant abdominal pain with associated nausea and vomiting. Reports that she has had multiple episodes of diarrhea today that have been nonbloody in nature. States that she feels mildly short of breath at rest.  Call placed to general surgery to notify of concerns for possible underlying appendicitis given initial CT A/P results. Repeat CT A/P obtained at recommendation of general surgery which noted a normal appendix. Patient's IV antibiotics were switched to Zosyn as she was on Rocephin only this morning. Appears that patient's IV fluids were stopped yesterday evening, will restart aggressive IV fluid hydration therapies and broad-spectrum antibiotics. Urine culture pending   - Update 1600: Patient noted to be hypotensive again 87/49 per nursing. Repeat manual blood pressure 98/52. Reassessed patient, she is resting in bed, still complaining of some 4/10 right lower quadrant abdominal pain however states is much improved. Continues to have right lower quadrant abdominal tenderness however benign abdomen no rigidity 8 appreciated. Checked I&O's in chartand only documented urine occurrences have been placed. Per nursing patient is incontinent. Called and discussed with patient's mother who confirmed this that she is incontinent at baseline. Order to place Dill catheter for strict I&O monitoring for better clinical decision making if kidney function okay. We will repeat lab work now. IV fluids increased to 150 mL/h. We will add IV vancomycin for more generalized broad-spectrum coverage given that patient still remains with high fevers, tachycardic and now hypotensive again. T-max today 102. 1. T-max since admission was 103.        Subjective (past 24 hours):      Denies chest pains, palpitations, dizziness, lightheadedness, RUSSO    Medications:  Reviewed    Infusion Medications    [Held by provider] lactated ringers IV soln 100 mL/hr at 01/26/23 1406    sodium chloride       Scheduled Medications    magnesium sulfate  4,000 mg IntraVENous Once    cefTRIAXone (ROCEPHIN) IV  1,000 mg IntraVENous Q24H    sodium chloride flush  10 mL IntraVENous 2 times per day    enoxaparin  40 mg SubCUTAneous Q24H     PRN Meds: magnesium sulfate, sodium chloride flush, sodium chloride, ondansetron **OR** ondansetron, polyethylene glycol, acetaminophen **OR** acetaminophen    No intake or output data in the 24 hours ending 01/27/23 0738    Diet:  ADULT DIET; Regular    Exam:  BP (!) 101/52   Pulse (!) 121   Temp 100 °F (37.8 °C) (Oral)   Resp 21   Ht 5' 2\" (1.575 m)   Wt 203 lb (92.1 kg)   LMP 01/12/2023 (Exact Date)   SpO2 92%   BMI 37.13 kg/m²     General appearance: No apparent distress, appears stated age and cooperative. HEENT: Pupils equal, round, and reactive to light. Conjunctivae/corneas clear. Neck: Supple, with full range of motion. No jugular venous distention. Trachea midline. Respiratory:  Normal respiratory effort. Able to speak full clear sentences. Clear to auscultation, bilaterally without Rales/Wheezes/Rhonchi. Cardiovascular: Regular rate and rhythm with normal S1/S2 without murmurs, rubs or gallops. Abdomen: Soft, non-distended with normal bowel sounds. RLQ tenderness upon palpation  Musculoskeletal: passive and active ROM x 4 extremities. Skin: Skin color, texture, turgor normal.  No rashes or lesions. Skin diaphoretic, warm  Neurologic:  Neurovascularly intact without any focal sensory/motor deficits.  Cranial nerves: II-XII intact, grossly non-focal.  Psychiatric: Alert and oriented, thought content appropriate, normal insight  Capillary Refill: Brisk,< 3 seconds   Peripheral Pulses: +2 palpable, equal bilaterally       Labs:   Recent Labs     01/26/23  0800 01/27/23  0338   WBC 27.1* 25.1*   HGB 13.9 12.6   HCT 41.5 37.5    213     Recent Labs     01/26/23  0800 01/26/23  1351 01/27/23  0338   * 136 137   K 3.6 3.7 3.7   CL 96* 104 105   CO2 21* 20* 21*   BUN 8 7 7   CREATININE 1.1 1.1 1.0   CALCIUM 8.7 7. 3* 7.5*     Recent Labs     01/26/23  0800 01/27/23  0338   AST 51* 27   ALT 52 34   BILITOT 1.7* 1.2   ALKPHOS 99 81     Recent Labs     01/26/23  1351   INR 1.68*     No results for input(s): CKTOTAL, TROPONINI in the last 72 hours. Microbiology:      Urinalysis:      Lab Results   Component Value Date/Time    NITRU NEGATIVE 01/26/2023 08:55 AM    WBCUA > 200 01/26/2023 08:55 AM    BACTERIA FEW 01/26/2023 08:55 AM    RBCUA 5-10 01/26/2023 08:55 AM    BLOODU MODERATE 01/26/2023 08:55 AM    SPECGRAV 1.009 01/26/2023 08:55 AM    GLUCOSEU NEGATIVE 07/16/2017 05:00 AM       Radiology:  XR CHEST (2 VW)    Result Date: 1/26/2023  PROCEDURE: XR CHEST (2 VW) CLINICAL INFORMATION: Tachycardia, shortness of breath TECHNIQUE: PA and lateral chest radiographs. COMPARISON: [facility] chest radiograph 2/4/2014 FINDINGS: Cardiomediastinal silhouette is within normal limits. Lung volumes are slightly low. There are indistinct alveolar and reticular opacities at the left lung base. Soft tissues and osseous structures are unremarkable. Left lower lung atelectasis/infiltrate. **This report has been created using voice recognition software. It may contain minor errors which are inherent in voice recognition technology. ** Final report electronically signed by Dr. Ruth Ann Quiroga on 1/26/2023 6:21 PM    CTA CHEST W WO CONTRAST    Result Date: 1/27/2023  Exam: CTA Chest with IV contrast. Procedure: Coronal and sagittal MIP reformats were performed Comparison: Chest x-ray 1/26/2023 Clinical history: Elevated d-dimer with tachycardia. Nephrolithiasis and septicemia Findings: Evaluation is limited by motion artifact No pulmonary emboli. No thoracic aortic aneurysm or dissection. No hilar or mediastinal adenopathy. No pericardial fluid collection. No pleural fluid collections. No pneumothorax Bilateral perihilar groundglass opacities may represent pulmonary edema or pneumonia.  No focal area of consolidation Left-sided  shunt Visualized liver and spleen do not demonstrate any acute process Prior cholecystectomy No thoracic compression fractures     Impression: Evaluation of the by motion 1. No pulmonary emboli. 2. No thoracic aortic aneurysm or dissection 3. Bilateral perihilar groundglass opacities may represent pulmonary edema or atypical pneumonia. No focal area of consolidation This document has been electronically signed by: Yuliana Stern MD on 01/27/2023 02:19 AM All CTs at this facility use dose modulation techniques and iterative reconstructions, and/or weight-based dosing when appropriate to reduce radiation to a low as reasonably achievable. 3D Post-processing was performed on this study. CT ABDOMEN PELVIS W IV CONTRAST Additional Contrast? None    Result Date: 1/26/2023  PROCEDURE: CT ABDOMEN PELVIS W IV CONTRAST CLINICAL INFORMATION: Right-sided abdominal pain. COMPARISON: CT abdomen and pelvis 7/16/2017. Gallbladder ultrasound 7/16/2017. TECHNIQUE: Axial 5 mm CT images were obtained through the abdomen and pelvis after the administration of 80  cc Isovue 370 intravenous contrast. Coronal and sagittal reconstructions were obtained. All CT scans at this facility use dose modulation, iterative reconstruction, and/or weight-based dosing when appropriate to reduce radiation dose to as low as reasonably achievable. FINDINGS: Lung bases: Dependent atelectasis is present. Liver/gallbladder/bilary tree: The gallbladder is surgically absent. No biliary ductal dilatation is observed. Hepatic steatosis appears unchanged. Pancreas: The evaluation is limited secondary to patient motion. Spleen : Unremarkable accounting for patient motion. Adrenal glands: Unremarkable accounting for patient motion. Kidneys/ ureters/ bladder: A 3 mm obstructing calculus at the right ureterovesicular junction (series 2, image 78) resultant mild right hydronephrosis and right hydroureter.  The right renal pelvis and proximal right ureter are enhancing with moderate perinephric fat stranding and inflammation observed. Patchy diminished enhancement of the right renal parenchyma is visualized. The left kidney is unremarkable. The urinary bladder is partially distended and grossly unremarkable. Gastrointestinal:  A  shunt in the right upper quadrant appears intact. No bowel obstruction, free fluid, fluid collection, or free air is observed. The appendix contains fluid however no periappendiceal fat stranding or inflammation is observed. A moderate sliding-type hiatal hernia appears stable. Retroperitoneum / lymph nodes: The aorta is not dilated. No lymphadenopathy is visualized. Pelvis: No adnexal lesions are observed. A 10 mm follicle is seen within the right ovary. Musculoskeletal: The visualized skeletal structures appear intact. 1. A 3 mm obstructing calculus at the right ureterovesicular junction results in mild right hydronephrosis and hydroureter with moderate right perinephric stranding and inflammation. Diminished enhancement of the right renal parenchyma as well as enhancement of the right renal pelvis and proximal right ureter suggest pyelonephritis. Correlate with urinalysis. 2. The appendix measures 7 mm in diameter and contains fluid however there is no periappendiceal fat stranding or inflammation to suggest acute appendicitis. 3. Limited examination secondary to patient motion. Chronic findings are discussed. **This report has been created using voice recognition software. It may contain minor errors which are inherent in voice recognition technology. ** Final report electronically signed by Dr Cordell Hayes on 1/26/2023 9:50 AM    FLUORO FOR SURGICAL PROCEDURES    Result Date: 1/26/2023  Radiology exam is complete. No Radiologist dictation. Please follow up with ordering provider.        DVT prophylaxis: [x] Lovenox                                 [] SCDs                                 [] SQ Heparin                                 [] Encourage ambulation           [] Already on Anticoagulation     Code Status: Full Code    PT/OT Eval Status: None    Tele:   [x] yes             [] no    Sinus tachycardia    Active Hospital Problems    Diagnosis Date Noted    Nephrolithiasis [N20.0] 01/26/2023     Priority: Medium    Obstruction of right ureter [N13.5] 01/26/2023     Priority: Medium       Electronically signed by DARI Terry CNP on 1/27/2023 at 7:45 AM

## 2023-01-27 NOTE — FLOWSHEET NOTE
01/27/23 0120   Treatment Team Notification   Reason for Communication Evaluate   [facility] Role Physician Assistant   Method of Communication Secure Message   Response See orders   Notification Time Cinthya León is having chills now, She said she had diarrhea 5 times today. She is still tachycardic running in the 140's. Her temperature though went down to 99.2 after i gave that tylenol suppository.     See orders

## 2023-01-27 NOTE — CARE COORDINATION
Case Management Assessment  Initial Evaluation    Date/Time of Evaluation: 1/27/2023 1:20 PM  Assessment Completed by: Naresh Navarrete RN    If patient is discharged prior to next notation, then this note serves as note for discharge by case management. Patient Name: Carlos A Lepe                   YOB: 2000  Diagnosis: Nephrolithiasis [N20.0]  Septicemia ([address] [A41.9]  Obstruction of right ureteropelvic junction (UPJ) due to stone [N20.1]  Obstruction of right ureter [N13.5]                   Date / Time: 1/26/2023  7:39 AM  Location: [facility]/037-A     Patient Admission Status: Inpatient   Readmission Risk (Low < 19, Mod (19-27), High > 27): Readmission Risk Score: 7.4    Current PCP: DARI Pollock CNP  PCP verified by CM? Yes    Chart Reviewed: Yes      History Provided by: Patient  Patient Orientation: Alert and Oriented    Patient Cognition: Alert    Hospitalization in the last 30 days (Readmission):  No    If yes, Readmission Assessment in CM Navigator will be completed. Advance Directives:      Code Status: Full Code   Patient's Primary Decision Maker is: Legal Next of Kin      Discharge Planning:    Patient lives with: Parent Type of Home: House  Primary Care Giver: Self  Patient Support Systems include: Parent   Current Financial resources: Medicaid  Current community resources: None  Current services prior to admission: Durable Medical Equipment            Current DME: Other (Comment) (Knee scooter)            Type of Home Care services:  None    ADLS  Prior functional level: Independent in ADLs/IADLs  Current functional level: Independent in ADLs/IADLs    Family can provide assistance at DC: Yes  Would you like Case Management to discuss the discharge plan with any other family members/significant others, and if so, who?  No  Plans to Return to Present Housing: Yes  Other Identified Issues/Barriers to RETURNING to current housing: None  Potential Assistance needed at discharge: N/A            Potential DME:    Patient expects to discharge to: [facility] for transportation at discharge: Family    Financial    Payor: Papa Sánchez / Plan: Papa Sánchez / Product Type: *No Product type* /     Does insurance require precert for SNF: Yes    Potential assistance Purchasing Medications: No  Meds-to-Beds request: No      [facility] [id number] - [place], [address] [phone] -  [phone]  [address] [facility] [address]  Phone: [phone] Fax: [phone]      Notes:    Factors facilitating achievement of predicted outcomes: Family support, Caregiver support, Cooperative, and Pleasant    Barriers to discharge: Medical complications    Additional Case Management Notes: Admitted through ED with emesis. Found to have right ureteral stone/UTI. Consulted Urology in ED and pt was taken to surgery from ED. POD #1 Cysto with right stent insertion. Urine and blood cultures negative to date. Tmax 103 in ED. This am is 100F. O2 down to 2L/nc with sats 92%. WBC 27.1 and 25.1. I-misty 1.02, Magnesium 1.3. UA: large leukocytes, moderate blood, trace ketones, few bacteria. Cefepime iv q12hr, Lovenox. IVF. Electrolyte replacements. IS. Acapella. Procedure:   1/26 CT abd/pelvis: A 3 mm obstructing calculus at the right ureterovesicular junction results in mild right hydronephrosis and hydroureter with moderate right perinephric stranding and inflammation. Diminished enhancement of the right renal parenchyma as well as enhancement of the right renal pelvis and proximal right ureter suggest pyelonephritis. Correlate with urinalysis. The appendix measures 7 mm in diameter and contains fluid however there is no periappendiceal fat stranding or inflammation to suggest acute appendicitis. 1/26 CXR: Left lower lung atelectasis/infiltrate. 1/27 CTA chest: No pulmonary emboli. No thoracic aortic aneurysm or dissection.  Bilateral perihilar groundglass opacities may represent pulmonary edema. or atypical pneumonia. No focal area of consolidation. 1/27 CT abd/pelvis: Small bilateral pleural effusions and patchy bilateral lower lobe airspace opacities can be clinically correlated for pneumonia. Patchy diminished enhancement in the right renal parenchyma suggesting pyelonephritis is not significantly changed. A right ureteral stent is present. Mild right-sided hydronephrosis and hydroureter has improved. The appendix is normal.  1/27 ECHO: ordered. The Plan for Transition of Care is related to the following treatment goals of Nephrolithiasis [N20.0]  Septicemia ([address] [A41.9]  Obstruction of right ureteropelvic junction (UPJ) due to stone [N20.1]  Obstruction of right ureter [N13.5]    Patient Goals/Plan/Treatment Preferences: Pt lives at home with her mother. She works at [employer], states her boss picks her up for work. Monitor for needs. Transportation/Food Security/Housekeeping Addressed: No issues identified.      Elvira Ace RN  Case Management Department

## 2023-01-27 NOTE — PROGRESS NOTES
Samm Tenorio, APRN - CNP  Urology Progress Note    Subjective: Margarita Henry is a 23 y.o. female. s/p Cystoscopy Right Stent Insertion on 1/26/2023    His/Her current Diet is: ADULT DIET; Regular. Since the previous note, the patient reports the following:  No acute issues overnight.  ++ fevers or chills. No nausea or vomiting. No chest pain or shortness of breath. No calf pain. Pain Controlled. Ambulating. Tolerating PO Diet.  +tachy, hypotensive    Plan:   Tolerating stent well  Denies hematuria  Denies pain  Needs to follow up for definitive cystoscopy right ureteroscopy, possible HLL, possible stent removal versus exchange in few weeks  Ux, Bcx pending - cont cefepime    Vitals and Labs:  Patient Vitals for the past 24 hrs:   BP Temp Temp src Pulse Resp SpO2 Height Weight   01/27/23 0411 (!) 101/52 100 °F (37.8 °C) Oral (!) 121 21 92 % -- --   01/27/23 0345 (!) 88/50 99.3 °F (37.4 °C) Oral (!) 123 20 93 % -- --   01/27/23 0230 (!) 105/57 -- -- (!) 127 -- (!) 88 % -- --   01/27/23 0120 -- -- -- (!) 142 -- 95 % -- --   01/27/23 0030 114/74 98.8 °F (37.1 °C) Oral (!) 119 20 94 % -- --   01/26/23 2130 -- (!) 102 °F (38.9 °C) Rectal (!) 120 -- -- -- --   01/26/23 2045 (!) 110/52 (!) 101.2 °F (38.4 °C) Oral (!) 133 20 94 % -- --   01/26/23 1800 -- -- -- (!) 156 -- -- -- --   01/26/23 1759 -- -- -- (!) 148 -- -- -- --   01/26/23 1750 (!) 114/56 (!) 100.5 °F (38.1 °C) -- (!) 129 -- 92 % -- --   01/26/23 1659 (!) 97/54 -- -- -- -- -- -- --   01/26/23 1616 -- (!) 100.8 °F (38.2 °C) Oral -- -- 100 % -- --   01/26/23 1515 (!) 92/54 -- -- (!) 135 -- -- -- --   01/26/23 1330 (!) 95/53 100.1 °F (37.8 °C) Oral (!) 131 20 96 % 5' 2\" (1.575 m) 203 lb (92.1 kg)   01/26/23 1320 (!) 92/55 -- -- (!) 133 22 96 % -- --   01/26/23 1315 (!) 96/52 -- -- (!) 132 26 96 % -- --   01/26/23 1310 (!) 98/52 -- -- (!) 135 28 97 % -- --   01/26/23 1305 (!) 97/55 (!) 100.8 °F (38.2 °C) Axillary (!) 133 28 94 % -- --   01/26/23 1300 (!) 98/57 -- -- (!) 134 26 93 % -- --   01/26/23 1255 (!) 103/58 -- -- (!) 135 24 93 % -- --   01/26/23 1250 (!) 107/54 -- -- (!) 143 26 93 % -- --   01/26/23 1245 (!) 112/58 99.1 °F (37.3 °C) Temporal (!) 144 28 92 % -- --   01/26/23 1136 (!) 100/51 98.6 °F (37 °C) Oral (!) 143 20 94 % -- --   01/26/23 1027 (!) 105/55 -- -- (!) 146 20 97 % -- --   01/26/23 0953 (!) 107/52 (!) 103 °F (39.4 °C) Oral (!) 150 20 98 % -- --     No intake/output data recorded. Recent Labs     01/26/23  0800 01/27/23  0338   WBC 27.1* 25.1*   HGB 13.9 12.6   HCT 41.5 37.5   MCV 88.1 88.9    213     Recent Labs     01/26/23  0800 01/26/23  1351 01/27/23  0338   * 136 137   K 3.6 3.7 3.7   CL 96* 104 105   CO2 21* 20* 21*   BUN 8 7 7   CREATININE 1.1 1.1 1.0       Recent Labs     01/26/23  0855   COLORU YELLOW   PHUR 6.5   LABCAST NONE SEEN  NONE SEEN   WBCUA > 200   RBCUA 5-10   YEAST NONE SEEN   BACTERIA FEW   SPECGRAV 1.009   LEUKOCYTESUR LARGE*   UROBILINOGEN 1.0   BILIRUBINUR NEGATIVE   BLOODU MODERATE*       Physical Exam:  No acute distress. Awake, alert and oriented. Neck is supple  Regular rate and rhythm. Normal peripheral pulses  No accessory muscles of inspiration. Symmetric chest rise  Abdomen soft, mildy tender, non-distended. No CVA tenderness. Voiding, denies hematuria  No calf pain. Minimal/no edema in bilateral lower extremities. Skin is warm, dry  Psych: mood, affect normal    Additional Lab/Culture results:     Imaging Reviewed:     DARI Byrnes - CNP independently reviewed the images and verified the radiology reports from:    XR CHEST (2 VW)    Result Date: 1/26/2023  PROCEDURE: XR CHEST (2 VW) CLINICAL INFORMATION: Tachycardia, shortness of breath TECHNIQUE: PA and lateral chest radiographs. COMPARISON: [facility] chest radiograph 2/4/2014 FINDINGS: Cardiomediastinal silhouette is within normal limits. Lung volumes are slightly low.  There are indistinct alveolar and reticular opacities at the left lung base. Soft tissues and osseous structures are unremarkable. Left lower lung atelectasis/infiltrate. **This report has been created using voice recognition software. It may contain minor errors which are inherent in voice recognition technology. ** Final report electronically signed by Dr. Meño Starks on 1/26/2023 6:21 PM    CTA CHEST W WO CONTRAST    Result Date: 1/27/2023  Exam: CTA Chest with IV contrast. Procedure: Coronal and sagittal MIP reformats were performed Comparison: Chest x-ray 1/26/2023 Clinical history: Elevated d-dimer with tachycardia. Nephrolithiasis and septicemia Findings: Evaluation is limited by motion artifact No pulmonary emboli. No thoracic aortic aneurysm or dissection. No hilar or mediastinal adenopathy. No pericardial fluid collection. No pleural fluid collections. No pneumothorax Bilateral perihilar groundglass opacities may represent pulmonary edema or pneumonia. No focal area of consolidation Left-sided  shunt Visualized liver and spleen do not demonstrate any acute process Prior cholecystectomy No thoracic compression fractures     Impression: Evaluation of the by motion 1. No pulmonary emboli. 2. No thoracic aortic aneurysm or dissection 3. Bilateral perihilar groundglass opacities may represent pulmonary edema or atypical pneumonia. No focal area of consolidation This document has been electronically signed by: Jessie Beltran MD on 01/27/2023 02:19 AM All CTs at this facility use dose modulation techniques and iterative reconstructions, and/or weight-based dosing when appropriate to reduce radiation to a low as reasonably achievable. 3D Post-processing was performed on this study. CT ABDOMEN PELVIS W IV CONTRAST Additional Contrast? None    Result Date: 1/26/2023  PROCEDURE: CT ABDOMEN PELVIS W IV CONTRAST CLINICAL INFORMATION: Right-sided abdominal pain. COMPARISON: CT abdomen and pelvis 7/16/2017. Gallbladder ultrasound 7/16/2017.  TECHNIQUE: Axial 5 mm CT images were obtained through the abdomen and pelvis after the administration of 80  cc Isovue 370 intravenous contrast. Coronal and sagittal reconstructions were obtained. All CT scans at this facility use dose modulation, iterative reconstruction, and/or weight-based dosing when appropriate to reduce radiation dose to as low as reasonably achievable. FINDINGS: Lung bases: Dependent atelectasis is present. Liver/gallbladder/bilary tree: The gallbladder is surgically absent. No biliary ductal dilatation is observed. Hepatic steatosis appears unchanged. Pancreas: The evaluation is limited secondary to patient motion. Spleen : Unremarkable accounting for patient motion. Adrenal glands: Unremarkable accounting for patient motion. Kidneys/ ureters/ bladder: A 3 mm obstructing calculus at the right ureterovesicular junction (series 2, image 78) resultant mild right hydronephrosis and right hydroureter. The right renal pelvis and proximal right ureter are enhancing with moderate perinephric fat stranding and inflammation observed. Patchy diminished enhancement of the right renal parenchyma is visualized. The left kidney is unremarkable. The urinary bladder is partially distended and grossly unremarkable. Gastrointestinal:  A  shunt in the right upper quadrant appears intact. No bowel obstruction, free fluid, fluid collection, or free air is observed. The appendix contains fluid however no periappendiceal fat stranding or inflammation is observed. A moderate sliding-type hiatal hernia appears stable. Retroperitoneum / lymph nodes: The aorta is not dilated. No lymphadenopathy is visualized. Pelvis: No adnexal lesions are observed. A 10 mm follicle is seen within the right ovary. Musculoskeletal: The visualized skeletal structures appear intact.      1. A 3 mm obstructing calculus at the right ureterovesicular junction results in mild right hydronephrosis and hydroureter with moderate right perinephric stranding and inflammation. Diminished enhancement of the right renal parenchyma as well as enhancement of the right renal pelvis and proximal right ureter suggest pyelonephritis. Correlate with urinalysis. 2. The appendix measures 7 mm in diameter and contains fluid however there is no periappendiceal fat stranding or inflammation to suggest acute appendicitis. 3. Limited examination secondary to patient motion. Chronic findings are discussed. **This report has been created using voice recognition software. It may contain minor errors which are inherent in voice recognition technology. ** Final report electronically signed by Dr Tiara Emerson on 1/26/2023 9:50 AM    FLUORO FOR SURGICAL PROCEDURES    Result Date: 1/26/2023  Radiology exam is complete. No Radiologist dictation. Please follow up with ordering provider.        Impression:    Patient Active Problem List   Diagnosis    Abdominal pain    Nephrolithiasis    Obstruction of right ureter       s/p Cystoscopy Right Stent Insertion on 1/26/2023    DARI Boswell - CNP  8:51 AM 1/27/2023

## 2023-01-27 NOTE — PROGRESS NOTES
[address] [facility] Pharmacokinetic Monitoring Service - Vancomycin     Keyana Hidalgo is a 23 y.o. female starting on vancomycin therapy for sepsis of unknown origin. Pharmacy consulted by Kristine Oswald for monitoring and adjustment. Target Concentration: Goal AUC/VIKTOR 400-600 mg*hr/L    Additional Antimicrobials: zosyn    Pertinent Laboratory Values: Wt Readings from Last 1 Encounters:   01/26/23 203 lb (92.1 kg)     Temp Readings from Last 1 Encounters:   01/27/23 (!) 101.3 °F (38.5 °C) (Rectal)     Estimated Creatinine Clearance: 92 mL/min (based on SCr of 1 mg/dL). Recent Labs     01/26/23  0800 01/26/23  1351 01/27/23  0338   CREATININE 1.1 1.1 1.0   WBC 27.1*  --  25.1*     Pertinent Cultures:  Culture Date Source Results   1/26/23 BCx2 ngtd   MRSA Nasal Swab: was ordered by provider, awaiting results.     Plan:  Dosing recommendations based on Bayesian software  Start vancomycin 1500mg q24h x 1, 1000mg q12h  Anticipated AUC of 423 and trough concentration of 13.1 mcg/mL at steady state  Renal labs as indicated   Pharmacy will continue to monitor patient and adjust therapy as indicated    Thank you for the consult,  Rangel Cheng, [facility]  1/27/2023 4:56 PM

## 2023-01-27 NOTE — PLAN OF CARE
Problem: Discharge Planning  Goal: Discharge to home or other facility with appropriate resources  Outcome: Progressing  Flowsheets (Taken 1/27/2023 0543)  Discharge to home or other facility with appropriate resources:   Identify barriers to discharge with patient and caregiver   Identify discharge learning needs (meds, wound care, etc)     Problem: Safety - Adult  Goal: Free from fall injury  Outcome: Progressing  Note: Bed locked & in low position, call light in reach, side-rails up x2, bed/chair alarm utilized, non-slip socks on when ambulating, reminded patient to use call light to call for assistance. Problem: Respiratory - Adult  Goal: Achieves optimal ventilation and oxygenation  Outcome: Progressing  Flowsheets (Taken 1/27/2023 0543)  Achieves optimal ventilation and oxygenation:   Assess for changes in respiratory status   Assess for changes in mentation and behavior   Position to facilitate oxygenation and minimize respiratory effort   Oxygen supplementation based on oxygen saturation or arterial blood gases  Note: Continue oxygen support at 2 lpm/nc     Problem: Cardiovascular - Adult  Goal: Maintains optimal cardiac output and hemodynamic stability  Outcome: Progressing  Flowsheets (Taken 1/27/2023 0543)  Maintains optimal cardiac output and hemodynamic stability:   Monitor blood pressure and heart rate   Monitor urine output and notify Licensed Independent Practitioner for values outside of normal range   Assess for signs of decreased cardiac output  Goal: Absence of cardiac dysrhythmias or at baseline  Outcome: Progressing  Flowsheets (Taken 1/27/2023 0543)  Absence of cardiac dysrhythmias or at baseline:   Monitor cardiac rate and rhythm   Assess for signs of decreased cardiac output   Care plan reviewed with patient. Patient verbalizes understanding of the care plan and contributed to goal setting.

## 2023-01-27 NOTE — TELEPHONE ENCOUNTER
DO NOT TAKE ASPIRIN,  FISH OIL, MOBIC,COUMADIN, IBUPROFEN, MOTRIN-LIKE DRUGS AND ANY MULTIVITAMINS OR OVER THE COUNTER SUPPLEMENTS 14 DAYS PRIOR TO SURGERY. MUST HAVE AN ADULT OVER THE AGE OF 18 WITH YOU AT THE TIME OF THE DISCHARGE AND WITH YOU AT HOME AFTER THE PROCEDURE FOR 24 HOURS          Hyacinth Almanzar 2000 Diagnosis:     Surgical Physician: Dr. Chasity Martinez have been scheduled for the procedure marked below:      Surgery: Cystoscopy, Right ureteroscopy, possible Holmium Laser Lithotripsy, possible right stent removal         Date: 2/28/23     Anesthesia: Anesthesiologist (General/Spinal)     Place of Service: [address] [place] [address] Second Floor Same Day Surgery         Arrive to same day surgery by:  6:00 AM  (Surgery time is subject to change)      INSTRUCTIONS AS MARKED BELOW:    1.  DO NOT eat or drink anything after midnight before surgery. 2.  We prefer you shower or bathe with an antibacterial soap (Dial) the morning of surgery. 3  Please bring a current medication list, photo ID and insurance card(s) with you  4. Okay to take Tylenol  5. If you take Glucophage, Metformin or Janumet, hold 48-hours prior to surgery  6  Take blood pressure or heart medication as directed, if taken in the morning take with a small sip of water  7.. The office will call you in 1-2 days after your procedure to schedule a follow up. DATE SENSITIVE TESTING-DO ON THE DATE LISTED *WALK IN *NO APPOINTMENT    DO PRE OP URINE CULTURE ON 2/14/23. ORDER INCLUDED.         Date: 1/27/2023

## 2023-01-27 NOTE — TELEPHONE ENCOUNTER
Dr Frank Storey placed right stent for 3mm stone at [facility]  Needs to follow up for definitive cystoscopy right ureteroscopy, possible HLL, possible stent removal versus exchange in few weeks

## 2023-01-27 NOTE — PROGRESS NOTES
Pharmacy Note - Extended Infusion Beta-Lactam Adjustment    Cefepime 2000mg Q12h for treatment of Urinary tract infection. Per [facility] Extended Infusion Beta-Lactam Policy, cefepime will be changed to 2000mg load followed by 2000mg Q12h extended infusion        Please call with any questions.     Thank you,    Ad Rosas, [facility]

## 2023-01-27 NOTE — PLAN OF CARE
Call placed to general surgery, spoke with Dr. Yoanna Nelson. Expressed concerns for acute appendicitis as patient febrile at 102.1, patient now complaining of 10/10 RLQ pain that radiates to RUQ. + Rovsing + Ileopsoas test on exam. Patient still having nausea has not been able to eat today. Received orders to get STAT CT A/P. This order was placed. IVF restarted. IV abx switched to Zosyn. Pharmacy called to verify and send STAT. Formal progress note to follow.

## 2023-01-27 NOTE — TELEPHONE ENCOUNTER
Patient is scheduled for surgery with Dr Reyes Jackson on 2/28/23. Surgery consent on arrival. Patient to do pre op urine culture on 2/14/23. Surgery instructions gone over verbally and mailed to patient. Patient will have an adult over the age of 25 with them at discharge and 24 hours after procedure.

## 2023-01-27 NOTE — TELEPHONE ENCOUNTER
SURGERY [address]  [address] [place] [address] [address] [facility] [address] [place], [address]      Phone *[phone] *[phone]   Surgical Scheduling Direct Line Phone *[phone] Fax *[phone]      Karen Feliz 2000 female    [address]   Marital Status:          Home Phone: [phone]      Cell Phone:    Telephone Information:   Mobile [phone]          Surgeon: Dr. Kurt Nguyen Surgery Date: 2/28/23   Time: 7:30 AM    Procedure: Cystoscopy, Right ureteroscopy, possible Holmium Laser Lithotripsy, possible right stent removal    Diagnosis: Kidney stone     Important Medical History:  In Epic    Special Inst/Equip:     CPT Codes:    69954, 93604  Latex Allergy: No     Cardiac Device:  No    Anesthesia:  General          Admission Type:  Same Day                        Admit Prior to Day of Surgery: No    Case Location:  Main OR            Preadmission Testing:  Phone Call          PAT Date and Time:______________________________________________________    PAT Confirmation #: ______________________________________________________    Post Op Visit: ___________________________________________________________    Need Preop Cardiac Clearance: No    Does Patient have Cardiologist/physician?     ne    Surgery Confirmation #: __________________________________________________    : ________________________   Date: __________________________     [employer] Name: Ynes Tian

## 2023-01-28 ENCOUNTER — APPOINTMENT (OUTPATIENT)
Dept: GENERAL RADIOLOGY | Age: 23
DRG: 720 | End: 2023-01-28
Payer: MEDICAID

## 2023-01-28 LAB
ALBUMIN SERPL BCG-MCNC: 2.7 G/DL (ref 3.5–5.1)
ALP SERPL-CCNC: 71 U/L (ref 38–126)
ALT SERPL W/O P-5'-P-CCNC: 23 U/L (ref 11–66)
ANION GAP SERPL CALC-SCNC: 9 MEQ/L (ref 8–16)
ARTERIAL PATENCY WRIST A: POSITIVE
AST SERPL-CCNC: 15 U/L (ref 5–40)
BASE EXCESS BLDA CALC-SCNC: -0.7 MMOL/L (ref -2–3)
BASE EXCESS BLDA CALC-SCNC: 1.3 MMOL/L (ref -2.5–2.5)
BASOPHILS ABSOLUTE: 0 THOU/MM3 (ref 0–0.1)
BASOPHILS NFR BLD AUTO: 0.3 %
BDY SITE: ABNORMAL
BILIRUB SERPL-MCNC: 0.8 MG/DL (ref 0.3–1.2)
BUN SERPL-MCNC: 5 MG/DL (ref 7–22)
C CAYETANENSIS DNA SPEC QL NAA+PROBE: NOT DETECTED
C DIFF TOX GENS STL QL NAA+PROBE: NOT DETECTED
CA-I BLD ISE-SCNC: 1.05 MMOL/L (ref 1.12–1.32)
CALCIUM SERPL-MCNC: 7.5 MG/DL (ref 8.5–10.5)
CAMPY SP DNA.DIARRHEA STL QL NAA+PROBE: NOT DETECTED
CHLORIDE SERPL-SCNC: 110 MEQ/L (ref 98–111)
CO2 SERPL-SCNC: 21 MEQ/L (ref 23–33)
COLLECTED BY:: ABNORMAL
COLLECTED BY:: ABNORMAL
CREAT SERPL-MCNC: 0.9 MG/DL (ref 0.4–1.2)
CRYPTOSP DNA SPEC QL NAA+PROBE: NOT DETECTED
DEPRECATED RDW RBC AUTO: 47.5 FL (ref 35–45)
DEVICE: ABNORMAL
E COLI O157H7 DNA SPEC QL NAA+PROBE: NORMAL
E HISTOLYT DNA SPEC QL NAA+PROBE: NOT DETECTED
EAEC PAA PLAS AGGR+AATA ST NAA+NON-PRB: NOT DETECTED
EC STX1+STX2 + H7 FLIC SPEC NAA+PROBE: NOT DETECTED
EOSINOPHIL NFR BLD AUTO: 0.2 %
EOSINOPHILS ABSOLUTE: 0 THOU/MM3 (ref 0–0.4)
EPEC EAE GENE STL QL NAA+NON-PROBE: NOT DETECTED
ERYTHROCYTE [DISTWIDTH] IN BLOOD BY AUTOMATED COUNT: 14.4 % (ref 11.5–14.5)
ETEC LTA+ST1A+ST1B TOX ST NAA+NON-PROBE: NOT DETECTED
FIO2 ON VENT O2 ANALYZER: 40 %
FIO2 ON VENT O2 ANALYZER: 5 %
G LAMBLIA DNA SPEC QL NAA+PROBE: NOT DETECTED
GFR SERPL CREATININE-BSD FRML MDRD: > 60 ML/MIN/1.73M2
GLUCOSE SERPL-MCNC: 94 MG/DL (ref 70–108)
HADV DNA SPEC QL NAA+PROBE: NOT DETECTED
HASTV RNA SPEC QL NAA+PROBE: NOT DETECTED
HCO3 BLDA-SCNC: 23 MMOL/L (ref 23–28)
HCO3 BLDA-SCNC: 26 MMOL/L (ref 23–28)
HCT VFR BLD AUTO: 33.8 % (ref 37–47)
HGB BLD-MCNC: 10.9 GM/DL (ref 12–16)
IMM GRANULOCYTES # BLD AUTO: 0.08 THOU/MM3 (ref 0–0.07)
IMM GRANULOCYTES NFR BLD AUTO: 0.5 %
LYMPHOCYTES ABSOLUTE: 1.7 THOU/MM3 (ref 1–4.8)
LYMPHOCYTES NFR BLD AUTO: 11.7 %
MAGNESIUM SERPL-MCNC: 1.8 MG/DL (ref 1.6–2.4)
MCH RBC QN AUTO: 28.8 PG (ref 26–33)
MCHC RBC AUTO-ENTMCNC: 32.2 GM/DL (ref 32.2–35.5)
MCV RBC AUTO: 89.4 FL (ref 81–99)
MONOCYTES ABSOLUTE: 0.8 THOU/MM3 (ref 0.4–1.3)
MONOCYTES NFR BLD AUTO: 5.4 %
NEUTROPHILS NFR BLD AUTO: 81.9 %
NOROVIRUS GI + GII RNA STL NAA+PROBE: NOT DETECTED
NRBC BLD AUTO-RTO: 0 /100 WBC
P SHIGELLOIDES DNA STL QL NAA+PROBE: NOT DETECTED
PCO2 BLDA: 41 MMHG (ref 35–45)
PCO2 TEMP ADJ BLDMV: 32 MMHG (ref 41–51)
PEEP SETTING VENT: 6 MMHG
PH BLDA: 7.42 [PH] (ref 7.35–7.45)
PH BLDMV: 7.46 [PH] (ref 7.31–7.41)
PHOSPHATE SERPL-MCNC: 2.3 MG/DL (ref 2.4–4.7)
PLATELET # BLD AUTO: 189 THOU/MM3 (ref 130–400)
PMV BLD AUTO: 12.1 FL (ref 9.4–12.4)
PO2 BLDA: 119 MMHG (ref 71–104)
PO2 BLDMV: 122 MMHG (ref 25–40)
POTASSIUM SERPL-SCNC: 3.8 MEQ/L (ref 3.5–5.2)
PROT SERPL-MCNC: 4.6 G/DL (ref 6.1–8)
RBC # BLD AUTO: 3.78 MILL/MM3 (ref 4.2–5.4)
RV RNA SPEC QL NAA+PROBE: NOT DETECTED
SALMONELLA DNA SPEC QL NAA+PROBE: NOT DETECTED
SAO2 % BLDA: 99 %
SAO2 % BLDMV: 99 %
SAPOVIRUS RNA SPEC QL NAA+PROBE: NOT DETECTED
SEGMENTED NEUTROPHILS ABSOLUTE COUNT: 12 THOU/MM3 (ref 1.8–7.7)
SHIGELLA SP+EIEC IPAH ST NAA+NON-PROBE: NOT DETECTED
SODIUM SERPL-SCNC: 140 MEQ/L (ref 135–145)
V CHOLERAE DNA SPEC QL NAA+PROBE: NOT DETECTED
VENTILATION MODE VENT: ABNORMAL
VIBRIO DNA SPEC NAA+PROBE: NOT DETECTED
WBC # BLD AUTO: 14.6 THOU/MM3 (ref 4.8–10.8)
Y ENTERO RECN STL QL NAA+PROBE: NOT DETECTED

## 2023-01-28 PROCEDURE — 87507 IADNA-DNA/RNA PROBE TQ 12-25: CPT

## 2023-01-28 PROCEDURE — 2700000000 HC OXYGEN THERAPY PER DAY

## 2023-01-28 PROCEDURE — 6370000000 HC RX 637 (ALT 250 FOR IP)

## 2023-01-28 PROCEDURE — 2580000003 HC RX 258

## 2023-01-28 PROCEDURE — 36415 COLL VENOUS BLD VENIPUNCTURE: CPT

## 2023-01-28 PROCEDURE — 83735 ASSAY OF MAGNESIUM: CPT

## 2023-01-28 PROCEDURE — 2140000000 HC CCU INTERMEDIATE R&B

## 2023-01-28 PROCEDURE — 80053 COMPREHEN METABOLIC PANEL: CPT

## 2023-01-28 PROCEDURE — 6360000002 HC RX W HCPCS

## 2023-01-28 PROCEDURE — 82803 BLOOD GASES ANY COMBINATION: CPT

## 2023-01-28 PROCEDURE — 94660 CPAP INITIATION&MGMT: CPT

## 2023-01-28 PROCEDURE — 99232 SBSQ HOSP IP/OBS MODERATE 35: CPT | Performed by: SURGERY

## 2023-01-28 PROCEDURE — 94761 N-INVAS EAR/PLS OXIMETRY MLT: CPT

## 2023-01-28 PROCEDURE — 6370000000 HC RX 637 (ALT 250 FOR IP): Performed by: PHYSICIAN ASSISTANT

## 2023-01-28 PROCEDURE — 99233 SBSQ HOSP IP/OBS HIGH 50: CPT

## 2023-01-28 PROCEDURE — 85025 COMPLETE CBC W/AUTO DIFF WBC: CPT

## 2023-01-28 PROCEDURE — 82330 ASSAY OF CALCIUM: CPT

## 2023-01-28 PROCEDURE — 71045 X-RAY EXAM CHEST 1 VIEW: CPT

## 2023-01-28 PROCEDURE — 84100 ASSAY OF PHOSPHORUS: CPT

## 2023-01-28 RX ORDER — ACETAMINOPHEN 325 MG/1
650 TABLET ORAL EVERY 4 HOURS PRN
Status: DISCONTINUED | OUTPATIENT
Start: 2023-01-28 | End: 2023-02-01 | Stop reason: HOSPADM

## 2023-01-28 RX ORDER — ACETAMINOPHEN 650 MG/1
650 SUPPOSITORY RECTAL EVERY 4 HOURS PRN
Status: DISCONTINUED | OUTPATIENT
Start: 2023-01-28 | End: 2023-02-01 | Stop reason: HOSPADM

## 2023-01-28 RX ORDER — TRAMADOL HYDROCHLORIDE 50 MG/1
25 TABLET ORAL EVERY 6 HOURS PRN
Status: DISCONTINUED | OUTPATIENT
Start: 2023-01-28 | End: 2023-01-30

## 2023-01-28 RX ORDER — TRAMADOL HYDROCHLORIDE 50 MG/1
50 TABLET ORAL EVERY 6 HOURS PRN
Status: DISCONTINUED | OUTPATIENT
Start: 2023-01-28 | End: 2023-01-30

## 2023-01-28 RX ORDER — LOPERAMIDE HYDROCHLORIDE 2 MG/1
2 CAPSULE ORAL 4 TIMES DAILY PRN
Status: DISCONTINUED | OUTPATIENT
Start: 2023-01-28 | End: 2023-02-01 | Stop reason: HOSPADM

## 2023-01-28 RX ORDER — CALCIUM GLUCONATE 20 MG/ML
2000 INJECTION, SOLUTION INTRAVENOUS ONCE
Status: COMPLETED | OUTPATIENT
Start: 2023-01-28 | End: 2023-01-28

## 2023-01-28 RX ADMIN — ENOXAPARIN SODIUM 40 MG: 100 INJECTION SUBCUTANEOUS at 20:33

## 2023-01-28 RX ADMIN — SODIUM CHLORIDE: 9 INJECTION, SOLUTION INTRAVENOUS at 00:32

## 2023-01-28 RX ADMIN — VANCOMYCIN HYDROCHLORIDE 1000 MG: 1 INJECTION, POWDER, LYOPHILIZED, FOR SOLUTION INTRAVENOUS at 05:50

## 2023-01-28 RX ADMIN — Medication 1 CAPSULE: at 13:12

## 2023-01-28 RX ADMIN — PIPERACILLIN AND TAZOBACTAM 3375 MG: 3; .375 INJECTION, POWDER, FOR SOLUTION INTRAVENOUS at 17:07

## 2023-01-28 RX ADMIN — TRIMETHOBENZAMIDE HYDROCHLORIDE 200 MG: 100 INJECTION INTRAMUSCULAR at 18:49

## 2023-01-28 RX ADMIN — ACETAMINOPHEN 650 MG: 325 TABLET ORAL at 20:33

## 2023-01-28 RX ADMIN — TRAMADOL HYDROCHLORIDE 50 MG: 50 TABLET, COATED ORAL at 13:12

## 2023-01-28 RX ADMIN — PIPERACILLIN AND TAZOBACTAM 3375 MG: 3; .375 INJECTION, POWDER, FOR SOLUTION INTRAVENOUS at 08:59

## 2023-01-28 RX ADMIN — SODIUM CHLORIDE, PRESERVATIVE FREE 10 ML: 5 INJECTION INTRAVENOUS at 20:33

## 2023-01-28 RX ADMIN — ACETAMINOPHEN 650 MG: 325 TABLET ORAL at 00:02

## 2023-01-28 RX ADMIN — VANCOMYCIN HYDROCHLORIDE 1000 MG: 1 INJECTION, POWDER, LYOPHILIZED, FOR SOLUTION INTRAVENOUS at 18:40

## 2023-01-28 RX ADMIN — Medication 1 CAPSULE: at 08:47

## 2023-01-28 RX ADMIN — ACETAMINOPHEN 650 MG: 650 SUPPOSITORY RECTAL at 06:09

## 2023-01-28 RX ADMIN — PIPERACILLIN AND TAZOBACTAM 3375 MG: 3; .375 INJECTION, POWDER, FOR SOLUTION INTRAVENOUS at 00:02

## 2023-01-28 RX ADMIN — SODIUM CHLORIDE: 9 INJECTION, SOLUTION INTRAVENOUS at 11:31

## 2023-01-28 RX ADMIN — CALCIUM GLUCONATE 2000 MG: 20 INJECTION, SOLUTION INTRAVENOUS at 09:02

## 2023-01-28 RX ADMIN — Medication 1 CAPSULE: at 17:07

## 2023-01-28 ASSESSMENT — PAIN DESCRIPTION - LOCATION: LOCATION: ABDOMEN

## 2023-01-28 ASSESSMENT — PAIN DESCRIPTION - DESCRIPTORS: DESCRIPTORS: ACHING

## 2023-01-28 ASSESSMENT — PAIN SCALES - GENERAL
PAINLEVEL_OUTOF10: 10
PAINLEVEL_OUTOF10: 3

## 2023-01-28 NOTE — PLAN OF CARE
Problem: Discharge Planning  Goal: Discharge to home or other facility with appropriate resources  1/28/2023 1017 by Benton Couch RN  Outcome: Progressing  Flowsheets (Taken 1/28/2023 0830)  Discharge to home or other facility with appropriate resources:   Identify barriers to discharge with patient and caregiver   Arrange for needed discharge resources and transportation as appropriate  1/28/2023 0424 by Andrew Day RN  Outcome: Progressing  Flowsheets (Taken 1/28/2023 0424)  Discharge to home or other facility with appropriate resources:   Identify barriers to discharge with patient and caregiver   Identify discharge learning needs (meds, wound care, etc)     Problem: Safety - Adult  Goal: Free from fall injury  1/28/2023 1017 by Benton Couch RN  Outcome: Progressing  1/28/2023 0424 by Andrew Day RN  Outcome: Progressing  Note: Bed locked & in low position, call light in reach, side-rails up x2, bed/chair alarm utilized, non-slip socks on when ambulating, reminded patient to use call light to call for assistance.       Problem: Respiratory - Adult  Goal: Achieves optimal ventilation and oxygenation  1/28/2023 1017 by Benton Couch RN  Outcome: Progressing  Flowsheets (Taken 1/28/2023 0830)  Achieves optimal ventilation and oxygenation:   Assess for changes in respiratory status   Assess for changes in mentation and behavior   Position to facilitate oxygenation and minimize respiratory effort  1/28/2023 0424 by Andrew Day RN  Outcome: Progressing  Flowsheets (Taken 1/28/2023 0424)  Achieves optimal ventilation and oxygenation:   Assess for changes in respiratory status   Assess for changes in mentation and behavior   Position to facilitate oxygenation and minimize respiratory effort   Oxygen supplementation based on oxygen saturation or arterial blood gases  Note: Continue oxygen support     Problem: Cardiovascular - Adult  Goal: Maintains optimal cardiac output and hemodynamic stability  1/28/2023 1017 by Malika Vallejo RN  Outcome: Progressing  Flowsheets (Taken 1/28/2023 0830)  Maintains optimal cardiac output and hemodynamic stability:   Monitor blood pressure and heart rate   Monitor urine output and notify Licensed Independent Practitioner for values outside of normal range  1/28/2023 0424 by Oc Mcgarry RN  Outcome: Progressing  Flowsheets (Taken 1/28/2023 0424)  Maintains optimal cardiac output and hemodynamic stability:   Monitor blood pressure and heart rate   Monitor urine output and notify Licensed Independent Practitioner for values outside of normal range   Assess for signs of decreased cardiac output  Goal: Absence of cardiac dysrhythmias or at baseline  1/28/2023 1017 by Malika Vallejo RN  Outcome: Progressing  Flowsheets (Taken 1/28/2023 0830)  Absence of cardiac dysrhythmias or at baseline:   Monitor cardiac rate and rhythm   Assess for signs of decreased cardiac output  1/28/2023 0424 by Oc Mcgarry RN  Outcome: Progressing  Flowsheets (Taken 1/28/2023 0424)  Absence of cardiac dysrhythmias or at baseline:   Monitor cardiac rate and rhythm   Assess for signs of decreased cardiac output     Problem: Infection - Adult  Goal: Absence of infection at discharge  1/28/2023 1017 by Malika Vallejo RN  Outcome: Progressing  Flowsheets (Taken 1/28/2023 0830)  Absence of infection at discharge:   Assess and monitor for signs and symptoms of infection   Monitor lab/diagnostic results  1/28/2023 0426 by Oc Mcgarry RN  Outcome: Progressing  Flowsheets (Taken 1/28/2023 0426)  Absence of infection at discharge:   Assess and monitor for signs and symptoms of infection   Monitor lab/diagnostic results   Monitor all insertion sites i.e., indwelling lines, tubes and drains   Administer medications as ordered  Goal: Absence of infection during hospitalization  1/28/2023 1017 by Malika Vallejo RN  Outcome: Progressing  Flowsheets (Taken 1/28/2023 0830)  Absence of infection during hospitalization: Assess and monitor for signs and symptoms of infection   Monitor lab/diagnostic results  1/28/2023 0426 by Mera Albert RN  Outcome: Progressing  Flowsheets (Taken 1/28/2023 7152)  Absence of infection during hospitalization:   Assess and monitor for signs and symptoms of infection   Monitor lab/diagnostic results   Administer medications as ordered  Goal: Absence of fever/infection during anticipated neutropenic period  1/28/2023 1017 by José Manuel Lopez RN  Outcome: Progressing  Flowsheets (Taken 1/28/2023 0830)  Absence of fever/infection during anticipated neutropenic period: Monitor white blood cell count  1/28/2023 0426 by Mera Albert RN  Outcome: Progressing  Flowsheets (Taken 1/28/2023 0426)  Absence of fever/infection during anticipated neutropenic period: Monitor white blood cell count     Care plan reviewed with patient. Patient verbalizes understanding of care plan and contributed with goal setting.

## 2023-01-28 NOTE — FLOWSHEET NOTE
01/28/23 0400   Treatment Team Notification   Reason for Communication Abnormal vitals   Team Member Name Dr. Estrada Team Role Physician Assistant   Method of Communication Secure Message   Response Waiting for response   Notification Time 0401     She is still feverish. Her rectal temp right now is 101. 8. her tylenol is q 6. She also got coarse crackles to her left lung fields. she is 86% on 2 lpm. She is getting . 9 NS at 150ml/hr. shes on 6lpm/nc. Do you want her fluids to be slowed down? Response: Yes.  Can you slow her down to 75 thank you

## 2023-01-28 NOTE — PLAN OF CARE
Problem: Discharge Planning  Goal: Discharge to home or other facility with appropriate resources  Outcome: Progressing  Flowsheets (Taken 1/28/2023 0424)  Discharge to home or other facility with appropriate resources:   Identify barriers to discharge with patient and caregiver   Identify discharge learning needs (meds, wound care, etc)     Problem: Safety - Adult  Goal: Free from fall injury  Outcome: Progressing  Note: Bed locked & in low position, call light in reach, side-rails up x2, bed/chair alarm utilized, non-slip socks on when ambulating, reminded patient to use call light to call for assistance.       Problem: Respiratory - Adult  Goal: Achieves optimal ventilation and oxygenation  Outcome: Progressing  Flowsheets (Taken 1/28/2023 0424)  Achieves optimal ventilation and oxygenation:   Assess for changes in respiratory status   Assess for changes in mentation and behavior   Position to facilitate oxygenation and minimize respiratory effort   Oxygen supplementation based on oxygen saturation or arterial blood gases  Note: Continue oxygen support     Problem: Cardiovascular - Adult  Goal: Maintains optimal cardiac output and hemodynamic stability  Outcome: Progressing  Flowsheets (Taken 1/28/2023 0424)  Maintains optimal cardiac output and hemodynamic stability:   Monitor blood pressure and heart rate   Monitor urine output and notify Licensed Independent Practitioner for values outside of normal range   Assess for signs of decreased cardiac output  Goal: Absence of cardiac dysrhythmias or at baseline  Outcome: Progressing  Flowsheets (Taken 1/28/2023 0424)  Absence of cardiac dysrhythmias or at baseline:   Monitor cardiac rate and rhythm   Assess for signs of decreased cardiac output     Problem: Infection - Adult  Goal: Absence of infection at discharge  Outcome: Progressing  Flowsheets (Taken 1/28/2023 0426)  Absence of infection at discharge:   Assess and monitor for signs and symptoms of infection Monitor lab/diagnostic results   Monitor all insertion sites i.e., indwelling lines, tubes and drains   Administer medications as ordered  Goal: Absence of infection during hospitalization  Outcome: Progressing  Flowsheets (Taken 1/28/2023 0426)  Absence of infection during hospitalization:   Assess and monitor for signs and symptoms of infection   Monitor lab/diagnostic results   Administer medications as ordered  Goal: Absence of fever/infection during anticipated neutropenic period  Outcome: Progressing  Flowsheets (Taken 1/28/2023 0426)  Absence of fever/infection during anticipated neutropenic period: Monitor white blood cell count     Care plan reviewed with patient. Patient verbalizes understanding of the care plan and contributed to goal setting.

## 2023-01-28 NOTE — PROGRESS NOTES
[facility]  General Surgery - Zaynab Taylor MD  Daily Progress Note  Pt Name: Hyacinth Rivas  Medical Record Number: [id number]  Date of Birth 2000   Today's Date: 1/28/2023  Chief complaint: Patient resting comfortably  ASSESSMENT:   Hospital day # 2   Sepsis secondary to pyelonephritis.  Follow-up CT no appendicitis.  Findings related to the urinary tract as below.  Elevated white count and patient's temperature are trending down as well as her tachycardia.  Acute hypoxic respiratory failure increased oxygen requirements  Bilateral pleural effusions.   has a past medical history of ADHD (attention deficit hyperactivity disorder) and Premature baby.  RECOMMENDATIONS:   IV hydration  Analgesics and antiemetics as needed  Diet per primary service  No evidence of an acute intra-abdominal general surgical process.  Surgery will sign off imaging reviewed.  Please reconsult as needed.  Thank you.  Antibiotics per primary service.  Vancomycin added to Zosyn.  Cultures preliminarily negative.  GI panel negative.  Respiratory panel negative  SUBJECTIVE:   Hyacinth is resting comfortably in bed.  She is on higher levels of oxygen.  All other measurable parameters are improving.  Follow-up CT imaging no evidence of appendicitis or bowel issue.  Abdomen soft on examination.  MEDICATIONS   Scheduled Meds:   calcium gluconate  2,000 mg IntraVENous Once    calcium replacement protocol   Other RX Placeholder    piperacillin-tazobactam  3,375 mg IntraVENous Q8H    lactobacillus  1 capsule Oral TID WC    vancomycin (VANCOCIN) intermittent dosing (placeholder)   Other RX Placeholder    vancomycin  1,000 mg IntraVENous Q12H    sodium chloride flush  10 mL IntraVENous 2 times per day    enoxaparin  40 mg SubCUTAneous Q24H     Continuous Infusions:   sodium chloride 75 mL/hr at 01/28/23 0509    sodium chloride       PRN Meds:.acetaminophen **OR** acetaminophen, magnesium sulfate, potassium chloride **OR** potassium  alternative oral replacement **OR** potassium chloride, sodium chloride flush, sodium chloride, ondansetron **OR** ondansetron, polyethylene glycol  OBJECTIVE   CURRENT VITALS:  height is 5' 2\" (1.575 m) and weight is 222 lb 3.6 oz (100.8 kg). Her rectal temperature is 100.7 °F (38.2 °C) (abnormal). Her blood pressure is 115/63 and her pulse is 113 (abnormal). Her respiration is 22 and oxygen saturation is 94%. Temperature Range (24h):Temp: (!) 100.7 °F (38.2 °C) Temp  Av °F (38.3 °C)  Min: 99.7 °F (37.6 °C)  Max: 102.1 °F (38.9 °C)  BP Range (15V): Systolic (95WNO), DGV:003 , Min:87 , NRF:964     Diastolic (74TBU), IGW:36, Min:49, Max:78    Pulse Range (24h): Pulse  Av  Min: 102  Max: 127  Respiration Range (24h): Resp  Av.1  Min: 18  Max: 22  Current Pulse Ox (24h):  SpO2: 94 %  Pulse Ox Range (24h):  SpO2  Av.8 %  Min: 86 %  Max: 94 %  Oxygen Amount and Delivery: O2 Flow Rate (L/min): 6 L/min  Incentive Spirometry Tx:            GENERAL: No acute distress resting comfortably  LUNGS: No audible wheezing diminished in bases sinus tachycardia though heart rate trending down 115  HEART: Sinus tachycardia heart rate trending down  ABDOMEN: Soft nondistended still mild tenderness no rebound no guarding  EXTREMITY: No cyanosis no edema  In: 2332.7 [P.O.:120;  I.V.:965.6]  Out: 2650 [Urine:2650]     Date 23 0000 - 23   Shift 6294-3255 2025-9034 4635-0889 24 Hour Total   INTAKE   Shift Total(mL/kg)       OUTPUT   Urine(mL/kg/hr) 1500   1500   Shift Total(mL/kg) 1500(14.9)   1500(14.9)   Weight (kg) 100.8 100.8 100.8 100.8     LABS     Recent Labs     23  0338 23  0378 23  0405   WBC 25.1* 18.6* 14.6*   HGB 12.6 11.1* 10.9*   HCT 37.5 34.5* 33.8*    198 189    138 140   K 3.7 3.6 3.8    107 110   CO2 21* 22* 21*   BUN 7 6* 5*   CREATININE 1.0 1.0 0.9   MG 1.3* 2.1 1.8   CALCIUM 7.5* 8.2* 7.5*      Recent Labs     23  1351 23  1314 INR 1.68* 1.33*     Recent Labs     01/26/23  0800 01/26/23  0901 01/27/23  0338 01/27/23  0854 01/27/23  1748 01/28/23  0405   AST 51*  --  27  --  19 15   ALT 52  --  34  --  27 23   BILITOT 1.7*  --  1.2  --  0.9 0.8   LIPASE 13.7  --   --   --   --   --    LACTA  --    < > 1.3 1.3 0.9  --     < > = values in this interval not displayed. Recent Labs     01/26/23  1351   TROPONINT < 0.010       Urine Culture:  No components found for: CURINE pending no growth preliminary  RADIOLOGY     XR CHEST PORTABLE   Final Result      Pulmonary vascular congestion. Borderline cardiomegaly. This document has been electronically signed by: Cindi Carlton MD on    01/28/2023 06:48 AM      CT ABDOMEN PELVIS W IV CONTRAST Additional Contrast? None   Final Result   1. Small bilateral pleural effusions and patchy bilateral lower lobe airspace opacities can be clinically correlated for pneumonia. 2. Patchy diminished enhancement in the right renal parenchyma suggesting pyelonephritis is not significantly changed. A right ureteral stent is present. Mild right-sided hydronephrosis and hydroureter has improved. 3. The appendix is normal.            **This report has been created using voice recognition software. It may contain minor errors which are inherent in voice recognition technology. **      Final report electronically signed by Dr Lizbet Frias on 1/27/2023 11:19 AM      CTA CHEST W WO CONTRAST   Final Result   Impression:   Evaluation of the by motion   1. No pulmonary emboli. 2. No thoracic aortic aneurysm or dissection   3. Bilateral perihilar groundglass opacities may represent pulmonary edema    or atypical pneumonia.  No focal area of consolidation      This document has been electronically signed by: Siobhan Tanner MD on    01/27/2023 02:19 AM      All CTs at this facility use dose modulation techniques and iterative    reconstructions, and/or weight-based dosing   when appropriate to reduce radiation to a low as reasonably achievable. 3D Post-processing was performed on this study. XR CHEST (2 VW)   Final Result      Left lower lung atelectasis/infiltrate. **This report has been created using voice recognition software. It may contain minor errors which are inherent in voice recognition technology. **      Final report electronically signed by Dr. Bernadine Flores on 1/26/2023 6:21 PM      FLUORO FOR SURGICAL PROCEDURES   Final Result      CT ABDOMEN PELVIS W IV CONTRAST Additional Contrast? None   Final Result   1. A 3 mm obstructing calculus at the right ureterovesicular junction results in mild right hydronephrosis and hydroureter with moderate right perinephric stranding and inflammation. Diminished enhancement of the right renal parenchyma as well as    enhancement of the right renal pelvis and proximal right ureter suggest pyelonephritis. Correlate with urinalysis. 2. The appendix measures 7 mm in diameter and contains fluid however there is no periappendiceal fat stranding or inflammation to suggest acute appendicitis. 3. Limited examination secondary to patient motion. Chronic findings are discussed. **This report has been created using voice recognition software. It may contain minor errors which are inherent in voice recognition technology. **      Final report electronically signed by Dr Tiara Emerson on 1/26/2023 9:50 AM      Appendix normal no evidence of a general surgical intra-abdominal process.     Electronically signed by Shelia Lozano MD on 1/28/2023 at 7:49 AM

## 2023-01-28 NOTE — PROGRESS NOTES
Order for PICC line received. Spoke to primary RN if okay to place line on 1/29 in AM. Patient has sufficient access at this time. IV team will follow up in the morning. 1/29 0760 spoke to attending regarding PICC line. Crayton Rubinstein, APRN-CNP states no need for PICC line.  Order discontinued by provider

## 2023-01-28 NOTE — PROGRESS NOTES
Physician Progress Note      PATIENT:               Viridiana Patel  CSN #:                  [id number]  :                       2000  ADMIT DATE:       2023 7:39 AM  DISCH DATE:  Eunice Shah  PROVIDER #:        Natanael Leiva CNP          QUERY TEXTMarcimary Galdamez,    Pt admitted with Sepsis with ureter obstruction. Pt noted to have abnormal   CT:  groundglass opacities may represent pulmonary edema or atypical   pneumonia. Pt being treated with IV ATB. If possible, please document in the   progress notes and discharge summary if you are evaluating and/or treating any   of the following:    Note: CAP and HCAP indicate where the pneumonia was acquired, not a specific   type. The medical record reflects the following:  Risk Factors: Sepsis, Temp 103, Pulse 150, 92% 2lnc, WBC 27  Clinical Indicators: CT-. Bilateral perihilar groundglass opacities may   represent pulmonary edema or atypical pneumonia. No focal area of   consolidation  Treatment: IV 0.9 NS 4000 ml bolus, IV Rocephin. Options provided:  -- Pneumonia, treating for both gram positive and gram negative organisms  -- Viral pneumonia  -- No PNA.  -- Other - I will add my own diagnosis  -- Disagree - Not applicable / Not valid  -- Disagree - Clinically unable to determine / Unknown  -- Refer to Clinical Documentation Reviewer    PROVIDER RESPONSE TEXT:    This pt has PNA, treating for both gram positive and gram negative organisms.     Query created by: Bacilio Vilchis on 2023 6:59 AM      Electronically signed by:  Natanael Leiva CNP 2023 7:28 PM

## 2023-01-29 LAB
ALBUMIN SERPL BCG-MCNC: 2.7 G/DL (ref 3.5–5.1)
ALP SERPL-CCNC: 76 U/L (ref 38–126)
ALT SERPL W/O P-5'-P-CCNC: 18 U/L (ref 11–66)
ANION GAP SERPL CALC-SCNC: 13 MEQ/L (ref 8–16)
AST SERPL-CCNC: 10 U/L (ref 5–40)
BACTERIA UR CULT: ABNORMAL
BASOPHILS ABSOLUTE: 0.1 THOU/MM3 (ref 0–0.1)
BASOPHILS NFR BLD AUTO: 0.6 %
BILIRUB SERPL-MCNC: 0.8 MG/DL (ref 0.3–1.2)
BUN SERPL-MCNC: 5 MG/DL (ref 7–22)
CA-I BLD ISE-SCNC: 1.03 MMOL/L (ref 1.12–1.32)
CALCIUM SERPL-MCNC: 7.9 MG/DL (ref 8.5–10.5)
CHLORIDE SERPL-SCNC: 106 MEQ/L (ref 98–111)
CO2 SERPL-SCNC: 24 MEQ/L (ref 23–33)
CREAT SERPL-MCNC: 0.7 MG/DL (ref 0.4–1.2)
DEPRECATED RDW RBC AUTO: 47.6 FL (ref 35–45)
EOSINOPHIL NFR BLD AUTO: 1.3 %
EOSINOPHILS ABSOLUTE: 0.1 THOU/MM3 (ref 0–0.4)
ERYTHROCYTE [DISTWIDTH] IN BLOOD BY AUTOMATED COUNT: 14.3 % (ref 11.5–14.5)
GFR SERPL CREATININE-BSD FRML MDRD: > 60 ML/MIN/1.73M2
GLUCOSE SERPL-MCNC: 80 MG/DL (ref 70–108)
HCT VFR BLD AUTO: 36.8 % (ref 37–47)
HGB BLD-MCNC: 11.6 GM/DL (ref 12–16)
IMM GRANULOCYTES # BLD AUTO: 0.09 THOU/MM3 (ref 0–0.07)
IMM GRANULOCYTES NFR BLD AUTO: 0.8 %
LYMPHOCYTES ABSOLUTE: 1.6 THOU/MM3 (ref 1–4.8)
LYMPHOCYTES NFR BLD AUTO: 14.5 %
MAGNESIUM SERPL-MCNC: 1.7 MG/DL (ref 1.6–2.4)
MCH RBC QN AUTO: 28.6 PG (ref 26–33)
MCHC RBC AUTO-ENTMCNC: 31.5 GM/DL (ref 32.2–35.5)
MCV RBC AUTO: 90.9 FL (ref 81–99)
MONOCYTES ABSOLUTE: 0.5 THOU/MM3 (ref 0.4–1.3)
MONOCYTES NFR BLD AUTO: 5 %
NEUTROPHILS NFR BLD AUTO: 77.8 %
NRBC BLD AUTO-RTO: 0 /100 WBC
ORGANISM: ABNORMAL
PHOSPHATE SERPL-MCNC: 2.6 MG/DL (ref 2.4–4.7)
PLATELET # BLD AUTO: 209 THOU/MM3 (ref 130–400)
PMV BLD AUTO: 12.1 FL (ref 9.4–12.4)
POTASSIUM SERPL-SCNC: 3.8 MEQ/L (ref 3.5–5.2)
PROT SERPL-MCNC: 5.1 G/DL (ref 6.1–8)
RBC # BLD AUTO: 4.05 MILL/MM3 (ref 4.2–5.4)
SEGMENTED NEUTROPHILS ABSOLUTE COUNT: 8.3 THOU/MM3 (ref 1.8–7.7)
SODIUM SERPL-SCNC: 143 MEQ/L (ref 135–145)
STONE ANALYSIS: NORMAL
VANCOMYCIN TROUGH SERPL-MCNC: 6.2 UG/ML (ref 10–20)
WBC # BLD AUTO: 10.7 THOU/MM3 (ref 4.8–10.8)

## 2023-01-29 PROCEDURE — 87086 URINE CULTURE/COLONY COUNT: CPT

## 2023-01-29 PROCEDURE — 99221 1ST HOSP IP/OBS SF/LOW 40: CPT | Performed by: NURSE PRACTITIONER

## 2023-01-29 PROCEDURE — 36415 COLL VENOUS BLD VENIPUNCTURE: CPT

## 2023-01-29 PROCEDURE — 99232 SBSQ HOSP IP/OBS MODERATE 35: CPT

## 2023-01-29 PROCEDURE — 82330 ASSAY OF CALCIUM: CPT

## 2023-01-29 PROCEDURE — 6370000000 HC RX 637 (ALT 250 FOR IP)

## 2023-01-29 PROCEDURE — 6360000002 HC RX W HCPCS

## 2023-01-29 PROCEDURE — 2580000003 HC RX 258

## 2023-01-29 PROCEDURE — 84100 ASSAY OF PHOSPHORUS: CPT

## 2023-01-29 PROCEDURE — 80053 COMPREHEN METABOLIC PANEL: CPT

## 2023-01-29 PROCEDURE — 85025 COMPLETE CBC W/AUTO DIFF WBC: CPT

## 2023-01-29 PROCEDURE — 83735 ASSAY OF MAGNESIUM: CPT

## 2023-01-29 PROCEDURE — 80202 ASSAY OF VANCOMYCIN: CPT

## 2023-01-29 PROCEDURE — 94669 MECHANICAL CHEST WALL OSCILL: CPT

## 2023-01-29 PROCEDURE — 2140000000 HC CCU INTERMEDIATE R&B

## 2023-01-29 RX ORDER — CALCIUM GLUCONATE 20 MG/ML
2000 INJECTION, SOLUTION INTRAVENOUS ONCE
Status: COMPLETED | OUTPATIENT
Start: 2023-01-29 | End: 2023-01-29

## 2023-01-29 RX ADMIN — LOPERAMIDE HYDROCHLORIDE 2 MG: 2 CAPSULE ORAL at 12:55

## 2023-01-29 RX ADMIN — PIPERACILLIN AND TAZOBACTAM 3375 MG: 3; .375 INJECTION, POWDER, FOR SOLUTION INTRAVENOUS at 08:43

## 2023-01-29 RX ADMIN — SODIUM CHLORIDE: 9 INJECTION, SOLUTION INTRAVENOUS at 00:05

## 2023-01-29 RX ADMIN — Medication 1 CAPSULE: at 08:29

## 2023-01-29 RX ADMIN — ENOXAPARIN SODIUM 40 MG: 100 INJECTION SUBCUTANEOUS at 20:28

## 2023-01-29 RX ADMIN — CALCIUM GLUCONATE 2000 MG: 20 INJECTION, SOLUTION INTRAVENOUS at 12:54

## 2023-01-29 RX ADMIN — VANCOMYCIN HYDROCHLORIDE 1000 MG: 1 INJECTION, POWDER, LYOPHILIZED, FOR SOLUTION INTRAVENOUS at 06:23

## 2023-01-29 RX ADMIN — Medication 1 CAPSULE: at 16:34

## 2023-01-29 RX ADMIN — VANCOMYCIN HYDROCHLORIDE 1500 MG: 5 INJECTION, POWDER, LYOPHILIZED, FOR SOLUTION INTRAVENOUS at 20:30

## 2023-01-29 RX ADMIN — Medication 1 CAPSULE: at 12:54

## 2023-01-29 RX ADMIN — PIPERACILLIN AND TAZOBACTAM 3375 MG: 3; .375 INJECTION, POWDER, FOR SOLUTION INTRAVENOUS at 00:06

## 2023-01-29 RX ADMIN — PIPERACILLIN AND TAZOBACTAM 3375 MG: 3; .375 INJECTION, POWDER, FOR SOLUTION INTRAVENOUS at 15:41

## 2023-01-29 ASSESSMENT — PAIN SCALES - GENERAL
PAINLEVEL_OUTOF10: 0
PAINLEVEL_OUTOF10: 0

## 2023-01-29 NOTE — PROGRESS NOTES
Neida Sorto, DARI - CNP  Urology Progress Note    Subjective: Trip Reid is a 23 y.o. female. s/p Cystoscopy Right Stent Insertion on 1/26/2023    His/Her current Diet is: ADULT DIET; Clear Liquid. Since the previous note, the patient reports the following:  No acute issues overnight. No fevers or chills. No nausea or vomiting. No chest pain or shortness of breath. No calf pain. Pain Controlled. Ambulating. Tolerating PO Diet. Patient resting comfortably in bed. Afebrile. No concerns by nursing staff. Vitals and Labs:  Patient Vitals for the past 24 hrs:   BP Temp Temp src Pulse Resp SpO2 Weight   01/29/23 0820 -- -- -- -- -- 94 % --   01/29/23 0815 122/75 98.9 °F (37.2 °C) Oral (!) 101 20 93 % --   01/29/23 0318 107/69 98.6 °F (37 °C) Oral 87 20 95 % 214 lb 8.1 oz (97.3 kg)   01/28/23 2327 106/69 98.6 °F (37 °C) Oral 84 22 94 % --   01/28/23 2029 120/65 98.3 °F (36.8 °C) Oral 98 24 96 % --   01/28/23 2022 -- -- -- 97 20 96 % --   01/28/23 1600 113/72 98.2 °F (36.8 °C) Oral 100 28 97 % --   01/28/23 1222 138/72 98.7 °F (37.1 °C) Oral (!) 103 28 97 % --     I/O last 3 completed shifts: In: 100 [IV Piggyback:100]  Out: 5050 [Urine:5050]    Recent Labs     01/27/23 1748 01/28/23 0405 01/29/23 0618   WBC 18.6* 14.6* 10.7   HGB 11.1* 10.9* 11.6*   HCT 34.5* 33.8* 36.8*   MCV 90.3 89.4 90.9    189 209     Recent Labs     01/27/23 1748 01/28/23 0405 01/29/23  0618    140 143   K 3.6 3.8 3.8    110 106   CO2 22* 21* 24   PHOS  --  2.3* 2.6   BUN 6* 5* 5*   CREATININE 1.0 0.9 0.7       No results for input(s): COLORU, PHUR, LABCAST, WBCUA, RBCUA, MUCUS, TRICHOMONAS, YEAST, BACTERIA, CLARITYU, SPECGRAV, LEUKOCYTESUR, UROBILINOGEN, BILIRUBINUR, BLOODU in the last 72 hours. Invalid input(s): NITRATE, GLUCOSEUKETONESUAMORPHOUS    Physical Exam:  No acute distress. Awake, alert and oriented. Neck is supple  Regular rate and rhythm.  Normal peripheral pulses  No accessory muscles of inspiration. Symmetric chest rise  Abdomen soft, non-tender, non-distended. No CVA tenderness. No calf pain. Minimal/no edema in bilateral lower extremities. Skin is warm, dry  Psych: mood, affect normal    Additional Lab/Culture results:     Imaging Reviewed:     DARI Wick CNP independently reviewed the images and verified the radiology reports from:    XR CHEST (2 VW)    Result Date: 1/26/2023  PROCEDURE: XR CHEST (2 VW) CLINICAL INFORMATION: Tachycardia, shortness of breath TECHNIQUE: PA and lateral chest radiographs. COMPARISON: [facility] chest radiograph 2/4/2014 FINDINGS: Cardiomediastinal silhouette is within normal limits. Lung volumes are slightly low. There are indistinct alveolar and reticular opacities at the left lung base. Soft tissues and osseous structures are unremarkable. Left lower lung atelectasis/infiltrate. **This report has been created using voice recognition software. It may contain minor errors which are inherent in voice recognition technology. ** Final report electronically signed by Dr. Meño Starks on 1/26/2023 6:21 PM    CTA CHEST W WO CONTRAST    Result Date: 1/27/2023  Exam: CTA Chest with IV contrast. Procedure: Coronal and sagittal MIP reformats were performed Comparison: Chest x-ray 1/26/2023 Clinical history: Elevated d-dimer with tachycardia. Nephrolithiasis and septicemia Findings: Evaluation is limited by motion artifact No pulmonary emboli. No thoracic aortic aneurysm or dissection. No hilar or mediastinal adenopathy. No pericardial fluid collection. No pleural fluid collections. No pneumothorax Bilateral perihilar groundglass opacities may represent pulmonary edema or pneumonia. No focal area of consolidation Left-sided  shunt Visualized liver and spleen do not demonstrate any acute process Prior cholecystectomy No thoracic compression fractures     Impression: Evaluation of the by motion 1. No pulmonary emboli.  2. No thoracic aortic aneurysm or dissection 3. Bilateral perihilar groundglass opacities may represent pulmonary edema or atypical pneumonia. No focal area of consolidation This document has been electronically signed by: Anaid Rivera MD on 01/27/2023 02:19 AM All CTs at this facility use dose modulation techniques and iterative reconstructions, and/or weight-based dosing when appropriate to reduce radiation to a low as reasonably achievable. 3D Post-processing was performed on this study. CT ABDOMEN PELVIS W IV CONTRAST Additional Contrast? None    Result Date: 1/27/2023  PROCEDURE: CT ABDOMEN PELVIS W IV CONTRAST CLINICAL INFORMATION: Recheck appendix. Right lower quadrant abdominal pain. COMPARISON: CT abdomen and pelvis 1/26/2023. TECHNIQUE: Axial 5 mm CT images were obtained through the abdomen and pelvis after the administration of 80  cc Isovue 370 intravenous contrast. Coronal and sagittal reconstructions were obtained. All CT scans at this facility use dose modulation, iterative reconstruction, and/or weight-based dosing when appropriate to reduce radiation dose to as low as reasonably achievable. FINDINGS: Lung bases: Small bilateral pleural effusions and bilateral lower lobe airspace opacities are present. Liver/gallbladder/bilary tree: The gallbladder is surgically absent. No biliary ductal dilatation is observed. Hepatic steatosis is unchanged. No liver lesions are identified. Pancreas: Normal. Spleen : Normal. Adrenal glands: Normal. Kidneys/ ureters/ bladder: A right ureteral stent is present. Right-sided hydronephrosis and hydroureter has improved. There continues to be patchy diminished enhancement in the right renal parenchyma. The left kidney is unremarkable. The urinary bladder  is opacified with contrast. Gastrointestinal:  The appendix is normal. No bowel obstruction, free fluid, fluid collection, or free air is identified. A  shunt in the right upper quadrant is unchanged.  A sliding-type hiatal hernia is stable. Retroperitoneum / lymph nodes: The aorta is not dilated. No lymphadenopathy is observed. Pelvis: No adnexal lesions are observed. A 10 mm right ovarian follicle is stable. Musculoskeletal: The visualized skeletal structures appear intact. 1. Small bilateral pleural effusions and patchy bilateral lower lobe airspace opacities can be clinically correlated for pneumonia. 2. Patchy diminished enhancement in the right renal parenchyma suggesting pyelonephritis is not significantly changed. A right ureteral stent is present. Mild right-sided hydronephrosis and hydroureter has improved. 3. The appendix is normal. **This report has been created using voice recognition software. It may contain minor errors which are inherent in voice recognition technology. ** Final report electronically signed by Dr Sparkle Villa on 1/27/2023 11:19 AM    CT ABDOMEN PELVIS W IV CONTRAST Additional Contrast? None    Result Date: 1/26/2023  PROCEDURE: CT ABDOMEN PELVIS W IV CONTRAST CLINICAL INFORMATION: Right-sided abdominal pain. COMPARISON: CT abdomen and pelvis 7/16/2017. Gallbladder ultrasound 7/16/2017. TECHNIQUE: Axial 5 mm CT images were obtained through the abdomen and pelvis after the administration of 80  cc Isovue 370 intravenous contrast. Coronal and sagittal reconstructions were obtained. All CT scans at this facility use dose modulation, iterative reconstruction, and/or weight-based dosing when appropriate to reduce radiation dose to as low as reasonably achievable. FINDINGS: Lung bases: Dependent atelectasis is present. Liver/gallbladder/bilary tree: The gallbladder is surgically absent. No biliary ductal dilatation is observed. Hepatic steatosis appears unchanged. Pancreas: The evaluation is limited secondary to patient motion. Spleen : Unremarkable accounting for patient motion. Adrenal glands: Unremarkable accounting for patient motion.  Kidneys/ ureters/ bladder: A 3 mm obstructing calculus at the right ureterovesicular junction (series 2, image 78) resultant mild right hydronephrosis and right hydroureter. The right renal pelvis and proximal right ureter are enhancing with moderate perinephric fat stranding and inflammation observed. Patchy diminished enhancement of the right renal parenchyma is visualized. The left kidney is unremarkable. The urinary bladder is partially distended and grossly unremarkable. Gastrointestinal:  A  shunt in the right upper quadrant appears intact. No bowel obstruction, free fluid, fluid collection, or free air is observed. The appendix contains fluid however no periappendiceal fat stranding or inflammation is observed. A moderate sliding-type hiatal hernia appears stable. Retroperitoneum / lymph nodes: The aorta is not dilated. No lymphadenopathy is visualized. Pelvis: No adnexal lesions are observed. A 10 mm follicle is seen within the right ovary. Musculoskeletal: The visualized skeletal structures appear intact. 1. A 3 mm obstructing calculus at the right ureterovesicular junction results in mild right hydronephrosis and hydroureter with moderate right perinephric stranding and inflammation. Diminished enhancement of the right renal parenchyma as well as enhancement of the right renal pelvis and proximal right ureter suggest pyelonephritis. Correlate with urinalysis. 2. The appendix measures 7 mm in diameter and contains fluid however there is no periappendiceal fat stranding or inflammation to suggest acute appendicitis. 3. Limited examination secondary to patient motion. Chronic findings are discussed. **This report has been created using voice recognition software. It may contain minor errors which are inherent in voice recognition technology. ** Final report electronically signed by Dr Lena Piper on 1/26/2023 9:50 AM    FLUORO FOR SURGICAL PROCEDURES    Result Date: 1/26/2023  Radiology exam is complete. No Radiologist dictation.  Please follow up with ordering provider. Impression:    Patient Active Problem List   Diagnosis    Abdominal pain    Nephrolithiasis    Obstruction of right ureter    Septicemia (HCC)    Pyelonephritis    Tachycardia    Hypotension due to hypovolemia    Diarrhea    Hypomagnesemia    Hyponatremia       s/p Cystoscopy Right Stent Insertion on 1/26/2023    Plan: Tolerating stent well  Denies hematuria  Denies pain  Needs to follow up for definitive cystoscopy right ureteroscopy, possible HLL, possible stent removal versus exchange in few weeks  Continue vanc and zosyn     Urology is signing off.      DARI Pressley CNP  9:36 AM 1/29/2023

## 2023-01-29 NOTE — PROGRESS NOTES
Hospitalist Progress Note    Patient:  Maria R Carranza      Unit/Bed:3B-37/037-A    YOB: 2000    MRN: [id number]       Acct: [de-identified]     PCP: DARI Wright CNP    Date of Admission: 1/26/2023    Assessment/Plan:    Sepsis secondary to pyelonephritis  Met 4/4 SIRS criteria (febrile 103, tachycardic 150 bpm, tachypneic 28 RRR, leukocytosis 27.1)  Organ Dysfunction: Acute hypoxic respiratory failure  Lactic acid level 1.8 (POA), has trended down and remained normal.  Received 30 cc/kg IV sepsis fluid boluses plus continuous IVF's  Received Zosyn and Rocephin in ED. Continue Zosyn (initiated 1/27)  Continue Vanc (initiated 1/27) > pharmacy to dose. BC x 2 preliminary result NGTD    Obstructing nephrolithiasis  CT shows a 3mm stone at the ureterovesicular junction resultatnt mild right hydronephrosis and right hydroureter. Urology consulted in ED. S/p cystoscopy with right stent insertion 1/26/23 by Dr. Chucky Espinoza. Urology following: Needs to follow up for definitive cystoscopy right ureteroscopy, possible HLL, possible stent removal versus exchange in few weeks    Pyelonephritis  CT shows right perinephric stranding and inflammation with diminished enhancement of right renal parenchyma and renal pelvis. Received zosyn and ceftriaxone in the ER. Continue Ceftriaxone and continue to monitor. UA notable for large LE, negative nitrite, moderate blood, 30 protein, trace ketones, greater than 200 WBCs, few bacteria. Urine culture showing growth of contaminants. Repeat urine cx ordered.   Continue Zosyn and Vanc    Acute hypoxic respiratory failure, improving  Satting 97% on 4L/NC  Continue to wean oxygen as tolerated  CXR (POA) notable for left lower lung atelectasis/infiltrate  CTA chest negative for PE, no thoracic aortic aneurysm or dissection, bilateral perihilar groundglass opacities may represent pulmonary edema versus atypical pneumonia  Repeat CT A/P showing small bilateral pleural effusions and patchy bilateral lower lobe airspace opacities can be clinically correlated for pneumonia  ABG 1/28 okay, showing PO2 119. Respiratory viral panel negative  Strep pneumoniae and Legionella urine antigen assay test negative  Molecular pneumonia panel ordered, respiratory culture ordered  Continue IV antibiotics in # 1  Pulmonary toilet: Incentive spirometer, Acapella    Leukocytosis:   Secondary to above. Trending down from 27.1 (POA). Continue IV abx. Hypotension, resolved  Secondary to sepsis. S/p aggressive IV fluid hydration  Monitor strict I&O's    Sinus tachycardia, improving  ECG (POA) notable for sinus tachycardia, with nonspecific ST abnormality in V2 through V3, without ischemic change  Echo notable for EF 55%, mild MR, trivial AK  Troponin negative  Suspect likely secondary to sepsis, dehydration, being febrile. Aggressive IV fluid administration. Continuous telemetry    Bilateral pleural effusions  Noted to be small on CT A/P (POA). Patient desatted to 86%, placed on 6L/NC with improvement. Repeat CXR 1/28 showing beginnings of pulmonary edema. Likely d/t aggressive IV hydration for sepsis. Patient placed temporarily on Bipap settings 12/6, Fio2 100% to blow fluid off.  1/29: Lung sounds clear. Possible appendicitis-ruled out  Initial CT shows 7 mm in diameter appendix contains fluid, no periappendiceal fat stranding. ER discussed with gen/surg. Gen/surg feels an acute appendicitis is unlikely at this time. Patient then had worsening RLQ pain on 1/27 with high fevers, + Rovsings, and Ileopsoas tests. Gen surg consulted and order for repeat CT placed. Repeat CT A/P showing normal appendix. Hyponatremia (POA), resolved  130 on admission. Likely 2/2 hypovolemia hyponatremia from emesis. IVF bolus and infusion.   Urine sodium 46, however this was obtained after receiving large amounts of IV fluids, not accurate measure at this time  Daily BMP  Continues IV fluids for hydration    Diarrhea, improving  Per report from patient and her mother she has been having multiple episodes prior to arrival to hospital.  Patient continues to have multiple small diarrheal bowel movements here in hospital.  Molecular GI panel negative. As needed immodium  Probiotics    History of hydrocephalus status post  shunt:   Noted per chart review, sequelae of prematurity. Hypomagnesemia:   Magnesium replacement protocol. Daily magnesium level    Hypophosphatemia  Phosphorus replacement protocol. Repeat lab in AM    Hypocalcemia  Calcium replacement protocol  Repeat lab in AM    Urinary incontinence:   Patient noted to be incontinent of urine multiple times on admission. Could be from pyelonephritis however did have formal discussion with patient's mother who reports patient is always incontinent. Dill catheter placed for strict I&O given severe sepsis and needing to monitor kidney function. Expected discharge date: Pending clinical course    Disposition:    [x] Home       [] TCU       [] Rehab       [] Psych       [] SNF       [] [place]       [] Other-    Chief Complaint: Emesis    Hospital Course: Per HPI documented 1/26/23: Selena Hilario is a 23 y.o. female with PMHx of hydrocephalus and prematurity who presented to [facility] with chief complaint of emesis. Patient reports she started vomiting yesterday with no associated nausea. States she could not keep anything down and continued to vomit today prompting her to come to the ER. She reports no associated pain with the vomiting. States she does not have abdominal pain, no urinary burning/discomfort/hesitancy/ frequency. States she does feel chilled and lightheaded with standing. Denies any headache, chest pain, palpitations, shortness of breath, and abdominal pain. \"    1/27/23: Resumed care patient today. Patient febrile, tachycardic, hypotensive. She is resting in bed, washing herself up with washcloth. Complains of 10/10 right lower quadrant abdominal pain with associated nausea and vomiting.  Reports that she has had multiple episodes of diarrhea today that have been nonbloody in nature.  States that she feels mildly short of breath at rest.  Call placed to general surgery to notify of concerns for possible underlying appendicitis given initial CT A/P results.  Repeat CT A/P obtained at recommendation of general surgery which noted a normal appendix.  Patient's IV antibiotics were switched to Zosyn as she was on Rocephin only this morning.  Appears that patient's IV fluids were stopped yesterday evening, will restart aggressive IV fluid hydration therapies and broad-spectrum antibiotics.  Urine culture pending   - Update 1600: Patient noted to be hypotensive again 87/49 per nursing.  Repeat manual blood pressure 98/52.  Reassessed patient, she is resting in bed, still complaining of some 4/10 right lower quadrant abdominal pain however states is much improved.  Continues to have right lower quadrant abdominal tenderness however benign abdomen no rigidity 8 appreciated.  Checked I&O's in chartand only documented urine occurrences have been placed.  Per nursing patient is incontinent.  Called and discussed with patient's mother who confirmed this that she is incontinent at baseline.  Order to place Dill catheter for strict I&O monitoring for better clinical decision making if kidney function okay.  We will repeat lab work now.  IV fluids increased to 150 mL/h.  We will add IV vancomycin for more generalized broad-spectrum coverage given that patient still remains with high fevers, tachycardic and now hypotensive again.  T-max today 102.1.  T-max since admission was 103.    1/2823: Patient febrile at 100.7, satting 93% on 6 L nasal cannula, mildly tachycardic at 103 bpm, relatively hypotensive.  Per nursing patient desatted overnight to 86%, was placed on 6 L nasal cannula with subsequent improvement in  oxygenation. Chest x-ray showing beginnings of pulmonary edema. We will trial BiPAP support to help blow this fluid off. We will decrease patient's IV fluids to rate of 100 mL an hour from 150 mL an hour. We can trial intermittent periods on nasal cannula off BiPAP so patient may eat. She is able to self rescue herself and understands when to. - Update 1230: Received a call from patient's nurse informing me that patient is satting 85% on 6 L nasal cannula while eating her lunch off of the BiPAP. Informed nursing that she needs to be on high flow nasal cannula when she is off BiPAP support given such drastic hypoxic desaturations. RN knows to put patient back on BiPAP support after she eats. Will obtain ABG this afternoon to evaluate her response. We will adjust patient's IV fluids accordingly pending progression of respiratory status, vital signs. Continue IV antibiotics at this time. 1/29/23: Afebrile, hemodynamically stable, satting 97% on 4 L nasal cannula. No acute events overnight. Patient has been successfully weaned from BiPAP support. Her lung sounds are clear and sound much better. Patient no longer having fevers today. Day 1 without a fever. Blood pressures appear to be stabilizing, heart rate also appears to be stabilizing. White count continues to downtrend. We will continue IV antibiotics at this time. Initial urine culture showing growth of contaminants. We repeat urine culture. Thus far all cultures have been negative however patient was overtly septic on admission. Patient stating that she is having some right lower quadrant abdominal pain rated 5/10. Subjective (past 24 hours):      Denies chest pains, shortness of breath at rest, palpitations, dizziness, lightheadedness, RUSSO, nausea, vomiting, diarrhea, fever, chills.     Medications:  Reviewed    Infusion Medications    sodium chloride 75 mL/hr at 01/29/23 0005    sodium chloride       Scheduled Medications vancomycin  1,500 mg IntraVENous Q12H    calcium gluconate  2,000 mg IntraVENous Once    calcium replacement protocol   Other RX Placeholder    piperacillin-tazobactam  3,375 mg IntraVENous Q8H    lactobacillus  1 capsule Oral TID     vancomycin (VANCOCIN) intermittent dosing (placeholder)   Other RX Placeholder    sodium chloride flush  10 mL IntraVENous 2 times per day    enoxaparin  40 mg SubCUTAneous Q24H     PRN Meds: acetaminophen **OR** acetaminophen, traMADol **OR** traMADol, sodium phosphate IVPB **OR** sodium phosphate IVPB **OR** sodium phosphate IVPB, trimethobenzamide, loperamide, magnesium sulfate, potassium chloride **OR** potassium alternative oral replacement **OR** potassium chloride, sodium chloride flush, sodium chloride, ondansetron **OR** ondansetron, polyethylene glycol      Intake/Output Summary (Last 24 hours) at 1/29/2023 1345  Last data filed at 1/29/2023 1333  Gross per 24 hour   Intake 1033. 85 ml   Output 3450 ml   Net -2416.15 ml       Diet:  ADULT DIET; Clear Liquid    Exam:  /77   Pulse 95   Temp 98.3 °F (36.8 °C) (Oral)   Resp 20   Ht 5' 2\" (1.575 m)   Wt 214 lb 8.1 oz (97.3 kg)   LMP 01/12/2023 (Exact Date)   SpO2 97%   BMI 39.23 kg/m²     General appearance: No apparent distress, appears stated age and cooperative. HEENT: Pupils equal, round, and reactive to light. Conjunctivae/corneas clear. Neck: Supple, with full range of motion. No jugular venous distention. Trachea midline. Respiratory:  Normal respiratory effort. Able to speak full clear sentences. Diminished to auscultation, bilaterally with Rales. No Wheezes/Rhonchi. Cardiovascular: Regular rate and rhythm with normal S1/S2 without murmurs, rubs or gallops. Abdomen: Soft, non-distended with normal bowel sounds. RLQ tenderness upon palpation  Musculoskeletal: passive and active ROM x 4 extremities. Skin: Skin color, texture, turgor normal.  No rashes or lesions.   Skin diaphoretic, warm  Neurologic: Neurovascularly intact without any focal sensory/motor deficits. Cranial nerves: II-XII intact, grossly non-focal.  Psychiatric: Alert and oriented, thought content appropriate, normal insight  Capillary Refill: Brisk,< 3 seconds   Peripheral Pulses: +2 palpable, equal bilaterally       Labs:   Recent Labs     01/27/23 1748 01/28/23  0405 01/29/23  0618   WBC 18.6* 14.6* 10.7   HGB 11.1* 10.9* 11.6*   HCT 34.5* 33.8* 36.8*    189 209     Recent Labs     01/27/23 1748 01/28/23  0405 01/29/23  0618    140 143   K 3.6 3.8 3.8    110 106   CO2 22* 21* 24   BUN 6* 5* 5*   CREATININE 1.0 0.9 0.7   CALCIUM 8.2* 7.5* 7.9*   PHOS  --  2.3* 2.6     Recent Labs     01/27/23 1748 01/28/23  0405 01/29/23  0618   AST 19 15 10   ALT 27 23 18   BILITOT 0.9 0.8 0.8   ALKPHOS 75 71 76     Recent Labs     01/26/23  1351 01/27/23  1314   INR 1.68* 1.33*     No results for input(s): Jennifer Moralezutandrade in the last 72 hours. Microbiology:      Urinalysis:      Lab Results   Component Value Date/Time    NITRU NEGATIVE 01/26/2023 08:55 AM    WBCUA > 200 01/26/2023 08:55 AM    BACTERIA FEW 01/26/2023 08:55 AM    RBCUA 5-10 01/26/2023 08:55 AM    BLOODU MODERATE 01/26/2023 08:55 AM    SPECGRAV 1.009 01/26/2023 08:55 AM    GLUCOSEU NEGATIVE 07/16/2017 05:00 AM       Radiology:  XR CHEST (2 VW)    Result Date: 1/26/2023  PROCEDURE: XR CHEST (2 VW) CLINICAL INFORMATION: Tachycardia, shortness of breath TECHNIQUE: PA and lateral chest radiographs. COMPARISON: [facility] chest radiograph 2/4/2014 FINDINGS: Cardiomediastinal silhouette is within normal limits. Lung volumes are slightly low. There are indistinct alveolar and reticular opacities at the left lung base. Soft tissues and osseous structures are unremarkable. Left lower lung atelectasis/infiltrate. **This report has been created using voice recognition software. It may contain minor errors which are inherent in voice recognition technology. ** Final report electronically signed by Dr. Mikayla Bueno on 1/26/2023 6:21 PM    CTA CHEST W WO CONTRAST    Result Date: 1/27/2023  Exam: CTA Chest with IV contrast. Procedure: Coronal and sagittal MIP reformats were performed Comparison: Chest x-ray 1/26/2023 Clinical history: Elevated d-dimer with tachycardia. Nephrolithiasis and septicemia Findings: Evaluation is limited by motion artifact No pulmonary emboli. No thoracic aortic aneurysm or dissection. No hilar or mediastinal adenopathy. No pericardial fluid collection. No pleural fluid collections. No pneumothorax Bilateral perihilar groundglass opacities may represent pulmonary edema or pneumonia. No focal area of consolidation Left-sided  shunt Visualized liver and spleen do not demonstrate any acute process Prior cholecystectomy No thoracic compression fractures     Impression: Evaluation of the by motion 1. No pulmonary emboli. 2. No thoracic aortic aneurysm or dissection 3. Bilateral perihilar groundglass opacities may represent pulmonary edema or atypical pneumonia. No focal area of consolidation This document has been electronically signed by: Osmel Aguilera MD on 01/27/2023 02:19 AM All CTs at this facility use dose modulation techniques and iterative reconstructions, and/or weight-based dosing when appropriate to reduce radiation to a low as reasonably achievable. 3D Post-processing was performed on this study. CT ABDOMEN PELVIS W IV CONTRAST Additional Contrast? None    Result Date: 1/26/2023  PROCEDURE: CT ABDOMEN PELVIS W IV CONTRAST CLINICAL INFORMATION: Right-sided abdominal pain. COMPARISON: CT abdomen and pelvis 7/16/2017. Gallbladder ultrasound 7/16/2017. TECHNIQUE: Axial 5 mm CT images were obtained through the abdomen and pelvis after the administration of 80  cc Isovue 370 intravenous contrast. Coronal and sagittal reconstructions were obtained.  All CT scans at this facility use dose modulation, iterative reconstruction, and/or weight-based dosing when appropriate to reduce radiation dose to as low as reasonably achievable. FINDINGS: Lung bases: Dependent atelectasis is present. Liver/gallbladder/bilary tree: The gallbladder is surgically absent. No biliary ductal dilatation is observed. Hepatic steatosis appears unchanged. Pancreas: The evaluation is limited secondary to patient motion. Spleen : Unremarkable accounting for patient motion. Adrenal glands: Unremarkable accounting for patient motion. Kidneys/ ureters/ bladder: A 3 mm obstructing calculus at the right ureterovesicular junction (series 2, image 78) resultant mild right hydronephrosis and right hydroureter. The right renal pelvis and proximal right ureter are enhancing with moderate perinephric fat stranding and inflammation observed. Patchy diminished enhancement of the right renal parenchyma is visualized. The left kidney is unremarkable. The urinary bladder is partially distended and grossly unremarkable. Gastrointestinal:  A  shunt in the right upper quadrant appears intact. No bowel obstruction, free fluid, fluid collection, or free air is observed. The appendix contains fluid however no periappendiceal fat stranding or inflammation is observed. A moderate sliding-type hiatal hernia appears stable. Retroperitoneum / lymph nodes: The aorta is not dilated. No lymphadenopathy is visualized. Pelvis: No adnexal lesions are observed. A 10 mm follicle is seen within the right ovary. Musculoskeletal: The visualized skeletal structures appear intact. 1. A 3 mm obstructing calculus at the right ureterovesicular junction results in mild right hydronephrosis and hydroureter with moderate right perinephric stranding and inflammation. Diminished enhancement of the right renal parenchyma as well as enhancement of the right renal pelvis and proximal right ureter suggest pyelonephritis. Correlate with urinalysis.  2. The appendix measures 7 mm in diameter and contains fluid however there is no periappendiceal fat stranding or inflammation to suggest acute appendicitis. 3. Limited examination secondary to patient motion. Chronic findings are discussed. **This report has been created using voice recognition software. It may contain minor errors which are inherent in voice recognition technology. ** Final report electronically signed by Dr Beatriz Stanton on 1/26/2023 9:50 AM    FLUORO FOR SURGICAL PROCEDURES    Result Date: 1/26/2023  Radiology exam is complete. No Radiologist dictation. Please follow up with ordering provider.        DVT prophylaxis: [x] Lovenox                                 [] SCDs                                 [] SQ Heparin                                 [] Encourage ambulation           [] Already on Anticoagulation     Code Status: Full Code    PT/OT Eval Status: None    Tele:   [x] yes             [] No    Normal sinus rhythm      Active Hospital Problems    Diagnosis Date Noted    [facility] [A41.9] 01/27/2023     Priority: Medium    Pyelonephritis [N12] 01/27/2023     Priority: Medium    Tachycardia [R00.0] 01/27/2023     Priority: Medium    Hypotension due to hypovolemia [I95.89, E86.1] 01/27/2023     Priority: Medium    Diarrhea [R19.7] 01/27/2023     Priority: Medium    Hypomagnesemia [E83.42] 01/27/2023     Priority: Medium    Hyponatremia [E87.1] 01/27/2023     Priority: Medium    Nephrolithiasis [N20.0] 01/26/2023     Priority: Medium    Obstruction of right ureter [N13.5] 01/26/2023     Priority: Medium       Electronically signed by DARI Delaney CNP on 1/29/2023 at 1:45 PM

## 2023-01-29 NOTE — PROGRESS NOTES
[address] [facility] Pharmacokinetic Monitoring Service - Vancomycin    Consulting Provider: Rosy Aguilar CNP   Indication: sepsis secondary to pyelonephritis  Target Concentration: Goal AUC/VIKTOR 400-600 mg*hr/L  Day of Therapy: 3  Additional Antimicrobials: 1000 mg IV every 12 hours    Pertinent Laboratory Values: Wt Readings from Last 1 Encounters:   01/29/23 214 lb 8.1 oz (97.3 kg)     Temp Readings from Last 1 Encounters:   01/29/23 98.6 °F (37 °C) (Oral)     Estimated Creatinine Clearance: 135 mL/min (based on SCr of 0.7 mg/dL).   Recent Labs     01/28/23  0405 01/29/23  0618   CREATININE 0.9 0.7   WBC 14.6* 10.7     Pertinent Cultures:  Culture Date Source Results   1/27/23 Blood x 2 NGTD x 24 hours   1/27/23 Urine NGTD   1/27/23 Strep Pneumoniae Antigen Negative   1/27/23 Legionella Antigen Negative   1/26/23 Blood x 2 NGTD x 48 hours     Recent vancomycin administrations                     vancomycin (VANCOCIN) 1,000 mg in sodium chloride 0.9 % 250 mL IVPB ([id number]) (mg) 1,000 mg New Bag 01/29/23 0623     1,000 mg New Bag 01/28/23 1840     1,000 mg New Bag  0550    vancomycin (VANCOCIN) 1500 mg in sodium chloride 0.9% 500 mL IVPB (mg) 1,500 mg New Bag 01/27/23 1847                    Assessment:  Date/Time Current Dose Concentration Timing of Concentration (h) AUC   1/29/23 at 6:18 1000 mg IV every 12 hours 6.2 ~ 11 hr 38 min post dose 303   Note: Serum concentrations collected for AUC dosing may appear elevated if collected in close proximity to the dose administered, this is not necessarily an indication of toxicity    Plan:  Current dosing regimen is sub-therapeutic  Increase dose to 1500 mg IV every 12 hours  Repeat vancomycin concentration on 1/30/23 at 12:00  Pharmacy will continue to monitor patient and adjust therapy as indicated    Thank you for the consult,  Lara Miller, [address] [facility]  1/29/2023 7:45 AM

## 2023-01-29 NOTE — PROGRESS NOTES
Hospitalist Progress Note    Patient:  Thomas Alicea      Unit/Bed:3B-37/037-A    YOB: 2000    MRN: [id number]       Acct: [de-identified]     PCP: DARI Castano CNP    Date of Admission: 1/26/2023    Assessment/Plan:    Sepsis secondary to pyelonephritis  Met 4/4 SIRS criteria (febrile 103, tachycardic 150 bpm, tachypneic 28 RRR, leukocytosis 27.1)  Organ Dysfunction: Acute hypoxic respiratory failure  Lactic acid level 1.8 (POA), has trended down and remained normal.  Received 30 cc/kg IV sepsis fluid boluses plus continuous IVF's  Received Zosyn and Rocephin in ED. Continue Zosyn (initiated 1/26)  Continue Vanc as patient continues to be tachycardic and have elevated fevers > pharmacy to dose. BC x 2 preliminary result NGTD    Obstructing nephrolithiasis  CT shows a 3mm stone at the ureterovesicular junction resultatnt mild right hydronephrosis and right hydroureter. Urology consulted in ED. S/p cystoscopy with right stent insertion 1/26/23 by Dr. Ramiro Escamilla. Urology following    Pyelonephritis  CT shows right perinephric stranding and inflammation with diminished enhancement of right renal parenchyma and renal pelvis. Received zosyn and ceftriaxone in the ER. Continue Ceftriaxone and continue to monitor. UA notable for large LE, negative nitrite, moderate blood, 30 protein, trace ketones, greater than 200 WBCs, few bacteria. Urine culture pending  Continue Zosyn and Vanc    Acute hypoxic respiratory failure  Patient desatted to 86%, placed on 6L/NC with improvement. Repeat CXR 1/28 showing beginnings of pulmonary edema. Will place patient on Bipap settings 12/6, Fio2 100% to aid in removing fluid. If respiratory status improves can wean off.    Continue to wean oxygen as tolerated  CXR (POA) notable for left lower lung atelectasis/infiltrate  CTA chest negative for PE, no thoracic aortic aneurysm or dissection, bilateral perihilar groundglass opacities may represent pulmonary edema versus atypical pneumonia  Repeat CT A/P showing small bilateral pleural effusions and patchy bilateral lower lobe airspace opacities can be clinically correlated for pneumonia  VBG showing primary respiratory alkalosis today 1/28  Respiratory viral panel negative  Strep pneumoniae and Legionella urine antigen assay test ordered  Care pneumonia panel ordered, respiratory culture ordered  Continue IV antibiotics No. 1  Pulmonary toilet: Incentive spirometer, Acapella    Leukocytosis:   Secondary to above. Trending down from 27.1 (POA). Continue IV abx. Hypotension, improving  Patient BP noted to be 88/50 this morning. Her IVF were held yesterday. Aggressive IV fluid hydration  Monitor strict I&O's    Sinus tachycardia, improving  ECG (POA) notable for sinus tachycardia, with nonspecific ST abnormality in V2 through V3, without ischemic change  Echo notable for EF 55%, mild MR, trivial CT  Troponin negative  Suspect likely secondary to sepsis, dehydration, being febrile. Aggressive IV fluid administration. Continuous telemetry    Bilateral pleural effusions  Noted to be small on CT A/P. Lung sounds clear. Continue aggressive IV fluid hydration as patient hypotensive, tachycardic. Will consider diuresing when able. Possible appendicitis-ruled out  Initial CT shows 7 mm in diameter appendix contains fluid, no periappendiceal fat stranding. ER discussed with gen/surg. Gen/surg feels an acute appendicitis is unlikely at this time. Patient then had worsening RLQ pain on 1/27 with high fevers, + Rovsings, and Ileopsoas tests. Gen surg consulted and order for repeat CT placed. Repeat CT A/P showing normal appendix. Hyponatremia (POA)  130 on admission. Likely 2/2 hypovolemia hyponatremia from emesis. IVF bolus and infusion.   Urine sodium 46, however this was obtained after receiving large amounts of IV fluids, not accurate measure at this time  Daily BMP  Continues IV fluids for hydration    Diarrhea  Per report from patient and her mother she has been having multiple episodes prior to arrival to hospital.  Patient continues to have multiple small diarrheal bowel movements here in hospital.  Molecular GI panel negative. Aggressive IV fluid hydration  As needed immodium  Probiotics    History of hydrocephalus status post  shunt:   Noted per chart review, sequelae of prematurity. Hypomagnesemia:   Magnesium replacement protocol. Daily magnesium level    Hypophosphatemia  Phosphorus replacement protocol. Repeat lab in AM    Hypocalcemia  Calcium replacement protocol  Repeat lab in AM    Urinary incontinence:   Patient noted to be incontinent of urine multiple times on admission. Could be from pyelonephritis however did have formal discussion with patient's mother who reports patient is always incontinent. Dill catheter placed for strict I&O given severe sepsis and needing to monitor kidney function. Expected discharge date: Pending clinical course    Disposition:    [x] Home       [] TCU       [] Rehab       [] Psych       [] SNF       [] [place]       [] Other-    Chief Complaint: Emesis    Hospital Course: Per HPI documented 1/26/23: Vinh De La Cruz is a 23 y.o. female with PMHx of hydrocephalus and prematurity who presented to [address] [place] [address] with chief complaint of emesis. Patient reports she started vomiting yesterday with no associated nausea. States she could not keep anything down and continued to vomit today prompting her to come to the ER. She reports no associated pain with the vomiting. States she does not have abdominal pain, no urinary burning/discomfort/hesitancy/ frequency. States she does feel chilled and lightheaded with standing. Denies any headache, chest pain, palpitations, shortness of breath, and abdominal pain. \"    1/27/23: Resumed care patient today. Patient febrile, tachycardic, hypotensive.   She is resting in bed, washing herself up with tevin. Complains of 10/10 right lower quadrant abdominal pain with associated nausea and vomiting. Reports that she has had multiple episodes of diarrhea today that have been nonbloody in nature. States that she feels mildly short of breath at rest.  Call placed to general surgery to notify of concerns for possible underlying appendicitis given initial CT A/P results. Repeat CT A/P obtained at recommendation of general surgery which noted a normal appendix. Patient's IV antibiotics were switched to Zosyn as she was on Rocephin only this morning. Appears that patient's IV fluids were stopped yesterday evening, will restart aggressive IV fluid hydration therapies and broad-spectrum antibiotics. Urine culture pending   - Update 1600: Patient noted to be hypotensive again 87/49 per nursing. Repeat manual blood pressure 98/52. Reassessed patient, she is resting in bed, still complaining of some 4/10 right lower quadrant abdominal pain however states is much improved. Continues to have right lower quadrant abdominal tenderness however benign abdomen no rigidity 8 appreciated. Checked I&O's in chartand only documented urine occurrences have been placed. Per nursing patient is incontinent. Called and discussed with patient's mother who confirmed this that she is incontinent at baseline. Order to place Dill catheter for strict I&O monitoring for better clinical decision making if kidney function okay. We will repeat lab work now. IV fluids increased to 150 mL/h. We will add IV vancomycin for more generalized broad-spectrum coverage given that patient still remains with high fevers, tachycardic and now hypotensive again. T-max today 102. 1. T-max since admission was 103.    1/2823: Patient febrile at 100.7, satting 93% on 6 L nasal cannula, mildly tachycardic at 103 bpm, relatively hypotensive.   Per nursing patient desatted overnight to 86%, was placed on 6 L nasal cannula with subsequent improvement in oxygenation. Chest x-ray showing beginnings of pulmonary edema. We will trial BiPAP support to help blow this fluid off. We will decrease patient's IV fluids to rate of 100 mL an hour from 150 mL an hour. We can trial intermittent periods on nasal cannula off BiPAP so patient may eat. She is able to self rescue herself and understands when to. - Update 1230: Received a call from patient's nurse informing me that patient is satting 85% on 6 L nasal cannula while eating her lunch off of the BiPAP. Informed nursing that she needs to be on high flow nasal cannula when she is off BiPAP support given such drastic hypoxic desaturations. RN knows to put patient back on BiPAP support after she eats. Will obtain ABG this afternoon to evaluate her response. We will adjust patient's IV fluids accordingly pending progression of respiratory status, vital signs. Continue IV antibiotics at this time. Subjective (past 24 hours):    Continues to report 10/10 abdominal pain, nausea, intermittent diarrhea. No vomiting.   Denies chest pains, shortness of breath at rest palpitations, dizziness, lightheadedness, RUSSO    Medications:  Reviewed    Infusion Medications    sodium chloride 75 mL/hr at 01/28/23 1507    sodium chloride       Scheduled Medications    calcium replacement protocol   Other RX Placeholder    piperacillin-tazobactam  3,375 mg IntraVENous Q8H    lactobacillus  1 capsule Oral TID     vancomycin (VANCOCIN) intermittent dosing (placeholder)   Other RX Placeholder    vancomycin  1,000 mg IntraVENous Q12H    sodium chloride flush  10 mL IntraVENous 2 times per day    enoxaparin  40 mg SubCUTAneous Q24H     PRN Meds: acetaminophen **OR** acetaminophen, traMADol **OR** traMADol, sodium phosphate IVPB **OR** sodium phosphate IVPB **OR** sodium phosphate IVPB, trimethobenzamide, magnesium sulfate, potassium chloride **OR** potassium alternative oral replacement **OR** potassium chloride, sodium chloride flush, sodium chloride, ondansetron **OR** ondansetron, polyethylene glycol      Intake/Output Summary (Last 24 hours) at 1/28/2023 2337  Last data filed at 1/28/2023 1901  Gross per 24 hour   Intake 100 ml   Output 3450 ml   Net -3350 ml       Diet:  ADULT DIET; Clear Liquid    Exam:  /69   Pulse 84   Temp 98.6 °F (37 °C) (Oral)   Resp 22   Ht 5' 2\" (1.575 m)   Wt 222 lb 3.6 oz (100.8 kg)   LMP 01/12/2023 (Exact Date)   SpO2 94%   BMI 40.65 kg/m²     General appearance: No apparent distress, appears stated age and cooperative. HEENT: Pupils equal, round, and reactive to light. Conjunctivae/corneas clear. Neck: Supple, with full range of motion. No jugular venous distention. Trachea midline. Respiratory:  Normal respiratory effort. Able to speak full clear sentences. Diminished to auscultation, bilaterally with Rales. No Wheezes/Rhonchi. Cardiovascular: Regular rate and rhythm with normal S1/S2 without murmurs, rubs or gallops. Abdomen: Soft, non-distended with normal bowel sounds. RLQ tenderness upon palpation  Musculoskeletal: passive and active ROM x 4 extremities. Skin: Skin color, texture, turgor normal.  No rashes or lesions. Skin diaphoretic, warm  Neurologic:  Neurovascularly intact without any focal sensory/motor deficits.  Cranial nerves: II-XII intact, grossly non-focal.  Psychiatric: Alert and oriented, thought content appropriate, normal insight  Capillary Refill: Brisk,< 3 seconds   Peripheral Pulses: +2 palpable, equal bilaterally       Labs:   Recent Labs     01/27/23 0338 01/27/23 1748 01/28/23  0405   WBC 25.1* 18.6* 14.6*   HGB 12.6 11.1* 10.9*   HCT 37.5 34.5* 33.8*    198 189     Recent Labs     01/27/23 0338 01/27/23 1748 01/28/23  0405    138 140   K 3.7 3.6 3.8    107 110   CO2 21* 22* 21*   BUN 7 6* 5*   CREATININE 1.0 1.0 0.9   CALCIUM 7.5* 8.2* 7.5*   PHOS  --   --  2.3*     Recent Labs     01/27/23  0338 01/27/23  1748 01/28/23  0405   [facility] 27 19 15 ALT 34 27 23   BILITOT 1.2 0.9 0.8   ALKPHOS 81 75 71     Recent Labs     01/26/23  1351 01/27/23  1314   INR 1.68* 1.33*     No results for input(s): CKTOTAL, TROPONINI in the last 72 hours. Microbiology:      Urinalysis:      Lab Results   Component Value Date/Time    NITRU NEGATIVE 01/26/2023 08:55 AM    WBCUA > 200 01/26/2023 08:55 AM    BACTERIA FEW 01/26/2023 08:55 AM    RBCUA 5-10 01/26/2023 08:55 AM    BLOODU MODERATE 01/26/2023 08:55 AM    SPECGRAV 1.009 01/26/2023 08:55 AM    GLUCOSEU NEGATIVE 07/16/2017 05:00 AM       Radiology:  XR CHEST (2 VW)    Result Date: 1/26/2023  PROCEDURE: XR CHEST (2 VW) CLINICAL INFORMATION: Tachycardia, shortness of breath TECHNIQUE: PA and lateral chest radiographs. COMPARISON: [facility] chest radiograph 2/4/2014 FINDINGS: Cardiomediastinal silhouette is within normal limits. Lung volumes are slightly low. There are indistinct alveolar and reticular opacities at the left lung base. Soft tissues and osseous structures are unremarkable. Left lower lung atelectasis/infiltrate. **This report has been created using voice recognition software. It may contain minor errors which are inherent in voice recognition technology. ** Final report electronically signed by Dr. Carmita Travis on 1/26/2023 6:21 PM    CTA CHEST W WO CONTRAST    Result Date: 1/27/2023  Exam: CTA Chest with IV contrast. Procedure: Coronal and sagittal MIP reformats were performed Comparison: Chest x-ray 1/26/2023 Clinical history: Elevated d-dimer with tachycardia. Nephrolithiasis and septicemia Findings: Evaluation is limited by motion artifact No pulmonary emboli. No thoracic aortic aneurysm or dissection. No hilar or mediastinal adenopathy. No pericardial fluid collection. No pleural fluid collections. No pneumothorax Bilateral perihilar groundglass opacities may represent pulmonary edema or pneumonia.  No focal area of consolidation Left-sided  shunt Visualized liver and spleen do not demonstrate any acute process Prior cholecystectomy No thoracic compression fractures     Impression: Evaluation of the by motion 1. No pulmonary emboli. 2. No thoracic aortic aneurysm or dissection 3. Bilateral perihilar groundglass opacities may represent pulmonary edema or atypical pneumonia. No focal area of consolidation This document has been electronically signed by: Nilam Benton MD on 01/27/2023 02:19 AM All CTs at this facility use dose modulation techniques and iterative reconstructions, and/or weight-based dosing when appropriate to reduce radiation to a low as reasonably achievable. 3D Post-processing was performed on this study. CT ABDOMEN PELVIS W IV CONTRAST Additional Contrast? None    Result Date: 1/26/2023  PROCEDURE: CT ABDOMEN PELVIS W IV CONTRAST CLINICAL INFORMATION: Right-sided abdominal pain. COMPARISON: CT abdomen and pelvis 7/16/2017. Gallbladder ultrasound 7/16/2017. TECHNIQUE: Axial 5 mm CT images were obtained through the abdomen and pelvis after the administration of 80  cc Isovue 370 intravenous contrast. Coronal and sagittal reconstructions were obtained. All CT scans at this facility use dose modulation, iterative reconstruction, and/or weight-based dosing when appropriate to reduce radiation dose to as low as reasonably achievable. FINDINGS: Lung bases: Dependent atelectasis is present. Liver/gallbladder/bilary tree: The gallbladder is surgically absent. No biliary ductal dilatation is observed. Hepatic steatosis appears unchanged. Pancreas: The evaluation is limited secondary to patient motion. Spleen : Unremarkable accounting for patient motion. Adrenal glands: Unremarkable accounting for patient motion. Kidneys/ ureters/ bladder: A 3 mm obstructing calculus at the right ureterovesicular junction (series 2, image 78) resultant mild right hydronephrosis and right hydroureter.  The right renal pelvis and proximal right ureter are enhancing with moderate perinephric fat stranding and inflammation observed. Patchy diminished enhancement of the right renal parenchyma is visualized. The left kidney is unremarkable. The urinary bladder is partially distended and grossly unremarkable. Gastrointestinal:  A  shunt in the right upper quadrant appears intact. No bowel obstruction, free fluid, fluid collection, or free air is observed. The appendix contains fluid however no periappendiceal fat stranding or inflammation is observed. A moderate sliding-type hiatal hernia appears stable. Retroperitoneum / lymph nodes: The aorta is not dilated. No lymphadenopathy is visualized. Pelvis: No adnexal lesions are observed. A 10 mm follicle is seen within the right ovary. Musculoskeletal: The visualized skeletal structures appear intact. 1. A 3 mm obstructing calculus at the right ureterovesicular junction results in mild right hydronephrosis and hydroureter with moderate right perinephric stranding and inflammation. Diminished enhancement of the right renal parenchyma as well as enhancement of the right renal pelvis and proximal right ureter suggest pyelonephritis. Correlate with urinalysis. 2. The appendix measures 7 mm in diameter and contains fluid however there is no periappendiceal fat stranding or inflammation to suggest acute appendicitis. 3. Limited examination secondary to patient motion. Chronic findings are discussed. **This report has been created using voice recognition software. It may contain minor errors which are inherent in voice recognition technology. ** Final report electronically signed by Dr Estrella Gomez on 1/26/2023 9:50 AM    FLUORO FOR SURGICAL PROCEDURES    Result Date: 1/26/2023  Radiology exam is complete. No Radiologist dictation. Please follow up with ordering provider.        DVT prophylaxis: [x] Lovenox                                 [] SCDs                                 [] SQ Heparin                                 [] Encourage ambulation           [] Already on Anticoagulation Code Status: Full Code    PT/OT Eval Status: None    Tele:   [x] yes             [] no    Sinus tachycardia    Active Hospital Problems    Diagnosis Date Noted    Septicemia ([facility] [address] [A41.9] 01/27/2023     Priority: Medium    Pyelonephritis [N12] 01/27/2023     Priority: Medium    Tachycardia [R00.0] 01/27/2023     Priority: Medium    Hypotension due to hypovolemia [I95.89, E86.1] 01/27/2023     Priority: Medium    Diarrhea [R19.7] 01/27/2023     Priority: Medium    Hypomagnesemia [E83.42] 01/27/2023     Priority: Medium    Hyponatremia [E87.1] 01/27/2023     Priority: Medium    Nephrolithiasis [N20.0] 01/26/2023     Priority: Medium    Obstruction of right ureter [N13.5] 01/26/2023     Priority: Medium       Electronically signed by DARI Mabry CNP on 1/28/2023 at 11:37 PM

## 2023-01-30 ENCOUNTER — APPOINTMENT (OUTPATIENT)
Dept: GENERAL RADIOLOGY | Age: 23
DRG: 720 | End: 2023-01-30
Payer: MEDICAID

## 2023-01-30 LAB
ALBUMIN SERPL BCG-MCNC: 2.8 G/DL (ref 3.5–5.1)
ALP SERPL-CCNC: 102 U/L (ref 38–126)
ALT SERPL W/O P-5'-P-CCNC: 17 U/L (ref 11–66)
ANION GAP SERPL CALC-SCNC: 12 MEQ/L (ref 8–16)
AST SERPL-CCNC: 15 U/L (ref 5–40)
BASOPHILS ABSOLUTE: 0.1 THOU/MM3 (ref 0–0.1)
BASOPHILS NFR BLD AUTO: 0.7 %
BILIRUB SERPL-MCNC: 0.7 MG/DL (ref 0.3–1.2)
BUN SERPL-MCNC: 4 MG/DL (ref 7–22)
CA-I BLD ISE-SCNC: 1.01 MMOL/L (ref 1.12–1.32)
CALCIUM SERPL-MCNC: 8 MG/DL (ref 8.5–10.5)
CHLORIDE SERPL-SCNC: 104 MEQ/L (ref 98–111)
CO2 SERPL-SCNC: 25 MEQ/L (ref 23–33)
CREAT SERPL-MCNC: 0.8 MG/DL (ref 0.4–1.2)
DEPRECATED RDW RBC AUTO: 44.6 FL (ref 35–45)
EOSINOPHIL NFR BLD AUTO: 2.2 %
EOSINOPHILS ABSOLUTE: 0.2 THOU/MM3 (ref 0–0.4)
ERYTHROCYTE [DISTWIDTH] IN BLOOD BY AUTOMATED COUNT: 13.8 % (ref 11.5–14.5)
GFR SERPL CREATININE-BSD FRML MDRD: > 60 ML/MIN/1.73M2
GLUCOSE SERPL-MCNC: 117 MG/DL (ref 70–108)
HCT VFR BLD AUTO: 37.6 % (ref 37–47)
HGB BLD-MCNC: 12.3 GM/DL (ref 12–16)
IMM GRANULOCYTES # BLD AUTO: 0.06 THOU/MM3 (ref 0–0.07)
IMM GRANULOCYTES NFR BLD AUTO: 0.8 %
LYMPHOCYTES ABSOLUTE: 2.2 THOU/MM3 (ref 1–4.8)
LYMPHOCYTES NFR BLD AUTO: 31.2 %
MAGNESIUM SERPL-MCNC: 1.7 MG/DL (ref 1.6–2.4)
MCH RBC QN AUTO: 29.1 PG (ref 26–33)
MCHC RBC AUTO-ENTMCNC: 32.7 GM/DL (ref 32.2–35.5)
MCV RBC AUTO: 88.9 FL (ref 81–99)
MONOCYTES ABSOLUTE: 0.5 THOU/MM3 (ref 0.4–1.3)
MONOCYTES NFR BLD AUTO: 7.6 %
NEUTROPHILS NFR BLD AUTO: 57.5 %
NRBC BLD AUTO-RTO: 0 /100 WBC
PHOSPHATE SERPL-MCNC: 2.3 MG/DL (ref 2.4–4.7)
PLATELET # BLD AUTO: 258 THOU/MM3 (ref 130–400)
PMV BLD AUTO: 11.7 FL (ref 9.4–12.4)
POTASSIUM SERPL-SCNC: 3.3 MEQ/L (ref 3.5–5.2)
PROT SERPL-MCNC: 5.3 G/DL (ref 6.1–8)
RBC # BLD AUTO: 4.23 MILL/MM3 (ref 4.2–5.4)
SEGMENTED NEUTROPHILS ABSOLUTE COUNT: 4.1 THOU/MM3 (ref 1.8–7.7)
SODIUM SERPL-SCNC: 141 MEQ/L (ref 135–145)
VANCOMYCIN SERPL-MCNC: 40.7 UG/ML (ref 0.1–39.9)
WBC # BLD AUTO: 7.2 THOU/MM3 (ref 4.8–10.8)

## 2023-01-30 PROCEDURE — 6360000002 HC RX W HCPCS

## 2023-01-30 PROCEDURE — 83735 ASSAY OF MAGNESIUM: CPT

## 2023-01-30 PROCEDURE — 99233 SBSQ HOSP IP/OBS HIGH 50: CPT

## 2023-01-30 PROCEDURE — 80202 ASSAY OF VANCOMYCIN: CPT

## 2023-01-30 PROCEDURE — 6370000000 HC RX 637 (ALT 250 FOR IP)

## 2023-01-30 PROCEDURE — 71045 X-RAY EXAM CHEST 1 VIEW: CPT

## 2023-01-30 PROCEDURE — 2580000003 HC RX 258

## 2023-01-30 PROCEDURE — 80053 COMPREHEN METABOLIC PANEL: CPT

## 2023-01-30 PROCEDURE — 85025 COMPLETE CBC W/AUTO DIFF WBC: CPT

## 2023-01-30 PROCEDURE — 36415 COLL VENOUS BLD VENIPUNCTURE: CPT

## 2023-01-30 PROCEDURE — 82330 ASSAY OF CALCIUM: CPT

## 2023-01-30 PROCEDURE — 1200000003 HC TELEMETRY R&B

## 2023-01-30 PROCEDURE — 84100 ASSAY OF PHOSPHORUS: CPT

## 2023-01-30 RX ORDER — CALCIUM GLUCONATE 20 MG/ML
2000 INJECTION, SOLUTION INTRAVENOUS ONCE
Status: COMPLETED | OUTPATIENT
Start: 2023-01-30 | End: 2023-01-30

## 2023-01-30 RX ORDER — HYDROCODONE BITARTRATE AND ACETAMINOPHEN 5; 325 MG/1; MG/1
1 TABLET ORAL EVERY 6 HOURS PRN
Status: DISCONTINUED | OUTPATIENT
Start: 2023-01-30 | End: 2023-02-01 | Stop reason: HOSPADM

## 2023-01-30 RX ORDER — FUROSEMIDE 10 MG/ML
40 INJECTION INTRAMUSCULAR; INTRAVENOUS ONCE
Status: COMPLETED | OUTPATIENT
Start: 2023-01-31 | End: 2023-01-31

## 2023-01-30 RX ADMIN — Medication 1 CAPSULE: at 18:56

## 2023-01-30 RX ADMIN — PIPERACILLIN AND TAZOBACTAM 3375 MG: 3; .375 INJECTION, POWDER, FOR SOLUTION INTRAVENOUS at 09:42

## 2023-01-30 RX ADMIN — PIPERACILLIN AND TAZOBACTAM 3375 MG: 3; .375 INJECTION, POWDER, FOR SOLUTION INTRAVENOUS at 00:32

## 2023-01-30 RX ADMIN — CALCIUM GLUCONATE 2000 MG: 20 INJECTION, SOLUTION INTRAVENOUS at 09:36

## 2023-01-30 RX ADMIN — SODIUM CHLORIDE, PRESERVATIVE FREE 10 ML: 5 INJECTION INTRAVENOUS at 21:39

## 2023-01-30 RX ADMIN — Medication 1 CAPSULE: at 09:37

## 2023-01-30 RX ADMIN — SODIUM CHLORIDE, PRESERVATIVE FREE 10 ML: 5 INJECTION INTRAVENOUS at 09:37

## 2023-01-30 RX ADMIN — VANCOMYCIN HYDROCHLORIDE 1500 MG: 5 INJECTION, POWDER, LYOPHILIZED, FOR SOLUTION INTRAVENOUS at 06:16

## 2023-01-30 RX ADMIN — Medication 1 CAPSULE: at 11:29

## 2023-01-30 RX ADMIN — ENOXAPARIN SODIUM 40 MG: 100 INJECTION SUBCUTANEOUS at 21:40

## 2023-01-30 RX ADMIN — POTASSIUM BICARBONATE 40 MEQ: 782 TABLET, EFFERVESCENT ORAL at 06:50

## 2023-01-30 RX ADMIN — PIPERACILLIN AND TAZOBACTAM 3375 MG: 3; .375 INJECTION, POWDER, FOR SOLUTION INTRAVENOUS at 18:55

## 2023-01-30 NOTE — CARE COORDINATION
1/30/23, 2:17 PM EST    [facility]  Location: -/[facility] Reason for admit: Nephrolithiasis [N20.0]  Septicemia ([address] [A41.9]  Obstruction of right ureteropelvic junction (UPJ) due to stone [N20.1]  Obstruction of right ureter [N13.5]   Procedure:   1/26 CT abd/pelvis: A 3 mm obstructing calculus at the right ureterovesicular junction results in mild right hydronephrosis and hydroureter with moderate right perinephric stranding and inflammation. Diminished enhancement of the right renal parenchyma as well as enhancement of the right renal pelvis and proximal right ureter suggest pyelonephritis. Correlate with urinalysis. The appendix measures 7 mm in diameter and contains fluid however there is no periappendiceal fat stranding or inflammation to suggest acute appendicitis. 1/26 CXR: Left lower lung atelectasis/infiltrate. 1/26 Cysto with right stent insertion. 1/27 CTA chest: No pulmonary emboli. No thoracic aortic aneurysm or dissection. Bilateral perihilar groundglass opacities may represent pulmonary edema. or atypical pneumonia. No focal area of consolidation. 1/27 CT abd/pelvis: Small bilateral pleural effusions and patchy bilateral lower lobe airspace opacities can be clinically correlated for pneumonia. Patchy diminished enhancement in the right renal parenchyma suggesting pyelonephritis is not significantly changed. A right ureteral stent is present. Mild right-sided hydronephrosis and hydroureter has improved. The appendix is normal.  1/27 ECHO: EF 55%. 1/28 CXR: Pulmonary vascular congestion. Borderline cardiomegaly. 1/30 CXR: Mild cardiomegaly. A ventriculoperitoneal shunt tube projects over left side of chest. Evidence for prior cholecystectomy. No effusion. Mild atelectasis/pneumonia both mid and lower lung fields, worse on the right. Overall appearance of chest improved from prior. Barriers to Discharge: Hospitalist following. Urology and surgery signed off. O2 remained at 5L/nc this am, encouraged RN to do IS and Acapella with pt. O2 weaned to 4L/nc. I-misty 1.01. Phosphorus 2.3. Total protein 5.3. Lovenox. Pain control. Culturelle. Zosyn iv q8hr. Vancomycin iv q12hr. PCP: Bettyjane Lundborg, APRN - CNP  Readmission Risk Score: 11.1%  Patient Goals/Plan/Treatment Preferences: Plans home with mother. Monitor for needs. Pt transferred to [facility], handoff report given to Lilliam Retana, 6K CM.

## 2023-01-30 NOTE — PLAN OF CARE
Problem: Discharge Planning  Goal: Discharge to home or other facility with appropriate resources  Outcome: Progressing  Flowsheets (Taken 1/30/2023 0929)  Discharge to home or other facility with appropriate resources:   Arrange for needed discharge resources and transportation as appropriate   Identify barriers to discharge with patient and caregiver   Identify discharge learning needs (meds, wound care, etc)     Problem: Safety - Adult  Goal: Free from fall injury  Outcome: Progressing  Flowsheets (Taken 1/30/2023 1316)  Free From Fall Injury: Instruct family/caregiver on patient safety     Problem: Respiratory - Adult  Goal: Achieves optimal ventilation and oxygenation  Outcome: Progressing  Flowsheets (Taken 1/30/2023 0929)  Achieves optimal ventilation and oxygenation: Assess for changes in respiratory status     Problem: Cardiovascular - Adult  Goal: Maintains optimal cardiac output and hemodynamic stability  Outcome: Progressing  Flowsheets (Taken 1/30/2023 0929)  Maintains optimal cardiac output and hemodynamic stability: Monitor blood pressure and heart rate     Problem: Cardiovascular - Adult  Goal: Absence of cardiac dysrhythmias or at baseline  Outcome: Progressing  Flowsheets (Taken 1/30/2023 0929)  Absence of cardiac dysrhythmias or at baseline: Monitor cardiac rate and rhythm     Problem: Infection - Adult  Goal: Absence of infection at discharge  Outcome: Progressing  Flowsheets (Taken 1/30/2023 0929)  Absence of infection at discharge: Assess and monitor for signs and symptoms of infection     Problem: Infection - Adult  Goal: Absence of infection during hospitalization  Outcome: Progressing  Flowsheets (Taken 1/30/2023 0929)  Absence of infection during hospitalization: Assess and monitor for signs and symptoms of infection     Problem: Infection - Adult  Goal: Absence of fever/infection during anticipated neutropenic period  Outcome: Progressing  Flowsheets (Taken 1/30/2023 0929)  Absence of fever/infection during anticipated neutropenic period: Monitor white blood cell count     Problem: Pain  Goal: Verbalizes/displays adequate comfort level or baseline comfort level  Outcome: Progressing   Care plan reviewed with patient. Patient verbalizes understanding of the care plan and contributed to goal setting.

## 2023-01-30 NOTE — PROGRESS NOTES
Hospitalist Progress Note    Patient:  Yahir Temple      Unit/Bed:6K-11/011-A    YOB: 2000    MRN: [id number]       Acct: [de-identified]     PCP: Bettyjane Lundborg, APRN - CNP    Date of Admission: 1/26/2023    Assessment/Plan:    Sepsis secondary to pyelonephritis  Met 4/4 SIRS criteria (febrile 103, tachycardic 150 bpm, tachypneic 28 RRR, leukocytosis 27.1)  Organ Dysfunction: Acute hypoxic respiratory failure  Lactic acid level 1.8 (POA), has trended down and remained normal.  Received 30 cc/kg IV sepsis fluid boluses plus continuous IVF's  Received Zosyn and Rocephin in ED. Continue Zosyn (initiated 1/27)  Received Vanc 1/27 - 1/30  BC x 2 preliminary result NGTD    Obstructing nephrolithiasis  CT shows a 3mm stone at the ureterovesicular junction resultatnt mild right hydronephrosis and right hydroureter. Urology consulted in ED. S/p cystoscopy with right stent insertion 1/26/23 by Dr. Tana Yi. Urology following: Needs to follow up for definitive cystoscopy right ureteroscopy, possible HLL, possible stent removal versus exchange in few weeks    Pyelonephritis  CT shows right perinephric stranding and inflammation with diminished enhancement of right renal parenchyma and renal pelvis. Received zosyn and ceftriaxone in the ER. Continue Ceftriaxone and continue to monitor. UA notable for large LE, negative nitrite, moderate blood, 30 protein, trace ketones, greater than 200 WBCs, few bacteria. Urine culture showing growth of contaminants.  Repeat urine cx showing preliminary result NGTD  Continue Zosyn    Acute hypoxic respiratory failure, improving  Satting 98% on 5L/NC  Continue to wean oxygen as tolerated  CXR (POA) notable for left lower lung atelectasis/infiltrate  CTA chest negative for PE, no thoracic aortic aneurysm or dissection, bilateral perihilar groundglass opacities may represent pulmonary edema versus atypical pneumonia  Repeat CT A/P showing small bilateral pleural effusions and patchy bilateral lower lobe airspace opacities can be clinically correlated for pneumonia  ABG 1/28 okay, showing PO2 119. Respiratory viral panel negative  Strep pneumoniae and Legionella urine antigen assay test negative  Molecular pneumonia panel ordered, respiratory culture ordered  Continue IV antibiotics in # 1  Pulmonary toilet: Incentive spirometer, Acapella      Leukocytosis:   Secondary to above. Trending down from 27.1 (POA). Continue IV abx. Hypotension, resolved  Secondary to sepsis. S/p aggressive IV fluid hydration  Monitor strict I&O's    Sinus tachycardia, improved  ECG (POA) notable for sinus tachycardia, with nonspecific ST abnormality in V2 through V3, without ischemic change  Echo notable for EF 55%, mild MR, trivial TN  Troponin negative  Suspect likely secondary to sepsis, dehydration, being febrile. S/p aggressive IV fluid administration. Continuous telemetry    Bilateral pleural effusions  Noted to be small on CT A/P (POA). Patient desatted to 86%, placed on 6L/NC with improvement. Repeat CXR 1/28 showing beginnings of pulmonary edema. Likely d/t aggressive IV hydration for sepsis. Patient placed temporarily on Bipap settings 12/6, Fio2 100% to blow fluid off.  1/29: Lung sounds clear. Possible appendicitis-ruled out  Initial CT shows 7 mm in diameter appendix contains fluid, no periappendiceal fat stranding. ER discussed with gen/surg. Gen/surg feels an acute appendicitis is unlikely at this time. Patient then had worsening RLQ pain on 1/27 with high fevers, + Rovsings, and Ileopsoas tests. Gen surg consulted and order for repeat CT placed. Repeat CT A/P showing normal appendix. Hyponatremia (POA), resolved  130 on admission. Likely 2/2 hypovolemia hyponatremia from emesis. IVF bolus and infusion.   Urine sodium 46, however this was obtained after receiving large amounts of IV fluids, not accurate measure at this time  Daily BMP  Continues IV fluids for hydration    Diarrhea, improving  Per report from patient and her mother she has been having multiple episodes prior to arrival to hospital.  Patient continues to have multiple small diarrheal bowel movements here in hospital.  Molecular GI panel negative. As needed immodium  Probiotics    History of hydrocephalus status post  shunt:   Noted per chart review, sequelae of prematurity. Hypomagnesemia:   Magnesium replacement protocol. Daily magnesium level    Hypophosphatemia  Phosphorus replacement protocol. Repeat lab in AM    Hypocalcemia  Calcium replacement protocol  Repeat lab in AM    Urinary incontinence:   Patient noted to be incontinent of urine multiple times on admission. Could be from pyelonephritis however did have formal discussion with patient's mother who reports patient is always incontinent. Dill catheter placed for strict I&O given severe sepsis and needing to monitor kidney function. Dill has since been removed. Expected discharge date: Pending clinical course    Disposition:    [x] Home       [] TCU       [] Rehab       [] Psych       [] SNF       [] [place]       [] Other-    Chief Complaint: Emesis    Hospital Course: Per HPI documented 1/26/23: Carrie Stevens is a 23 y.o. female with PMHx of hydrocephalus and prematurity who presented to [facility] with chief complaint of emesis. Patient reports she started vomiting yesterday with no associated nausea. States she could not keep anything down and continued to vomit today prompting her to come to the ER. She reports no associated pain with the vomiting. States she does not have abdominal pain, no urinary burning/discomfort/hesitancy/ frequency. States she does feel chilled and lightheaded with standing. Denies any headache, chest pain, palpitations, shortness of breath, and abdominal pain. \"    1/27/23: Resumed care patient today. Patient febrile, tachycardic, hypotensive.   She is resting in bed, washing herself up with washcloth. Complains of 10/10 right lower quadrant abdominal pain with associated nausea and vomiting. Reports that she has had multiple episodes of diarrhea today that have been nonbloody in nature. States that she feels mildly short of breath at rest.  Call placed to general surgery to notify of concerns for possible underlying appendicitis given initial CT A/P results. Repeat CT A/P obtained at recommendation of general surgery which noted a normal appendix. Patient's IV antibiotics were switched to Zosyn as she was on Rocephin only this morning. Appears that patient's IV fluids were stopped yesterday evening, will restart aggressive IV fluid hydration therapies and broad-spectrum antibiotics. Urine culture pending   - Update 1600: Patient noted to be hypotensive again 87/49 per nursing. Repeat manual blood pressure 98/52. Reassessed patient, she is resting in bed, still complaining of some 4/10 right lower quadrant abdominal pain however states is much improved. Continues to have right lower quadrant abdominal tenderness however benign abdomen no rigidity 8 appreciated. Checked I&O's in chartand only documented urine occurrences have been placed. Per nursing patient is incontinent. Called and discussed with patient's mother who confirmed this that she is incontinent at baseline. Order to place Dill catheter for strict I&O monitoring for better clinical decision making if kidney function okay. We will repeat lab work now. IV fluids increased to 150 mL/h. We will add IV vancomycin for more generalized broad-spectrum coverage given that patient still remains with high fevers, tachycardic and now hypotensive again. T-max today 102. 1. T-max since admission was 103.    1/2823: Patient febrile at 100.7, satting 93% on 6 L nasal cannula, mildly tachycardic at 103 bpm, relatively hypotensive.   Per nursing patient desatted overnight to 86%, was placed on 6 L nasal cannula with subsequent improvement in oxygenation. Chest x-ray showing beginnings of pulmonary edema. We will trial BiPAP support to help blow this fluid off. We will decrease patient's IV fluids to rate of 100 mL an hour from 150 mL an hour. We can trial intermittent periods on nasal cannula off BiPAP so patient may eat. She is able to self rescue herself and understands when to. - Update 1230: Received a call from patient's nurse informing me that patient is satting 85% on 6 L nasal cannula while eating her lunch off of the BiPAP. Informed nursing that she needs to be on high flow nasal cannula when she is off BiPAP support given such drastic hypoxic desaturations. RN knows to put patient back on BiPAP support after she eats. Will obtain ABG this afternoon to evaluate her response. We will adjust patient's IV fluids accordingly pending progression of respiratory status, vital signs. Continue IV antibiotics at this time. 1/29/23: Afebrile, hemodynamically stable, satting 97% on 4 L nasal cannula. No acute events overnight. Patient has been successfully weaned from BiPAP support. Her lung sounds are clear and sound much better. Patient no longer having fevers today. Day 1 without a fever. Blood pressures appear to be stabilizing, heart rate also appears to be stabilizing. White count continues to downtrend. We will continue IV antibiotics at this time. Initial urine culture showing growth of contaminants. We repeat urine culture. Thus far all cultures have been negative however patient was overtly septic on admission. Patient stating that she is having some right lower quadrant abdominal pain rated 5/10.    1/30/23: Well, hemodynamically stable. Patient satting 95% on 5 L nasal cannula. Instructed nursing staff to wean oxygen. Per nursing staff every time they try to attempt to wean oxygen patient dropped into the upper 80s. Will obtain repeat chest x-ray for further evaluation of hypoxic desaturations. Encouraged patient to get up and move and get out of bed. Encouraged her to ambulate in the hallways and use her incentive spirometer and Acapella device. This is day 2 without fevers. She was reporting improvement in her right lower quadrant abdominal pain rated 4/10 today. Subjective (past 24 hours):      Denies chest pains, shortness of breath at rest, palpitations, dizziness, lightheadedness, RUSSO, nausea, vomiting, diarrhea, fever, chills. Medications:  Reviewed    Infusion Medications    sodium chloride       Scheduled Medications    [START ON 1/31/2023] furosemide  40 mg IntraVENous Once    calcium replacement protocol   Other RX Placeholder    piperacillin-tazobactam  3,375 mg IntraVENous Q8H    lactobacillus  1 capsule Oral TID WC    sodium chloride flush  10 mL IntraVENous 2 times per day    enoxaparin  40 mg SubCUTAneous Q24H     PRN Meds: HYDROcodone 5 mg - acetaminophen, acetaminophen **OR** acetaminophen, sodium phosphate IVPB **OR** sodium phosphate IVPB **OR** sodium phosphate IVPB, trimethobenzamide, loperamide, magnesium sulfate, potassium chloride **OR** potassium alternative oral replacement **OR** potassium chloride, sodium chloride flush, sodium chloride, ondansetron **OR** ondansetron, polyethylene glycol      Intake/Output Summary (Last 24 hours) at 1/30/2023 2109  Last data filed at 1/30/2023 2037  Gross per 24 hour   Intake 352 ml   Output 2275 ml   Net -1923 ml       Diet:  ADULT DIET; Regular; GI [place] (GERD/Peptic Ulcer)    Exam:  /85   Pulse 85   Temp 98.5 °F (36.9 °C) (Oral)   Resp 18   Ht 5' 2\" (1.575 m)   Wt 203 lb 12.8 oz (92.4 kg)   LMP 01/12/2023 (Exact Date)   SpO2 98%   BMI 37.28 kg/m²     General appearance: No apparent distress, appears stated age and cooperative. HEENT: Pupils equal, round, and reactive to light. Conjunctivae/corneas clear. Neck: Supple, with full range of motion. No jugular venous distention. Trachea midline.   Respiratory:  Normal respiratory effort. Able to speak full clear sentences. Clear to auscultation bilaterally, without Rales/Wheezes/Rhonchi. Cardiovascular: Regular rate and rhythm with normal S1/S2 without murmurs, rubs or gallops. Abdomen: Soft, non-distended with normal bowel sounds. Mild RLQ tenderness upon palpation  Musculoskeletal: passive and active ROM x 4 extremities. Skin: Skin color, texture, turgor normal.  No rashes or lesions. Skin diaphoretic, warm  Neurologic:  Neurovascularly intact without any focal sensory/motor deficits. Cranial nerves: II-XII intact, grossly non-focal.  Psychiatric: Alert and oriented, thought content appropriate, normal insight  Capillary Refill: Brisk,< 3 seconds   Peripheral Pulses: +2 palpable, equal bilaterally       Labs:   Recent Labs     01/28/23  0405 01/29/23  0618 01/30/23  0356   WBC 14.6* 10.7 7.2   HGB 10.9* 11.6* 12.3   HCT 33.8* 36.8* 37.6    209 258     Recent Labs     01/28/23  0405 01/29/23  0618 01/30/23  0356    143 141   K 3.8 3.8 3.3*    106 104   CO2 21* 24 25   BUN 5* 5* 4*   CREATININE 0.9 0.7 0.8   CALCIUM 7.5* 7.9* 8.0*   PHOS 2.3* 2.6 2.3*     Recent Labs     01/28/23  0405 01/29/23  0618 01/30/23  0356   AST 15 10 15   ALT 23 18 17   BILITOT 0.8 0.8 0.7   ALKPHOS 71 76 102     No results for input(s): INR in the last 72 hours. No results for input(s): Pedro Ebbs in the last 72 hours. Microbiology:      Urinalysis:      Lab Results   Component Value Date/Time    NITRU NEGATIVE 01/26/2023 08:55 AM    WBCUA > 200 01/26/2023 08:55 AM    BACTERIA FEW 01/26/2023 08:55 AM    RBCUA 5-10 01/26/2023 08:55 AM    BLOODU MODERATE 01/26/2023 08:55 AM    SPECGRAV 1.009 01/26/2023 08:55 AM    GLUCOSEU NEGATIVE 07/16/2017 05:00 AM       Radiology:  XR CHEST (2 VW)    Result Date: 1/26/2023  PROCEDURE: XR CHEST (2 VW) CLINICAL INFORMATION: Tachycardia, shortness of breath TECHNIQUE: PA and lateral chest radiographs.  COMPARISON: [facility] chest radiograph 2/4/2014 FINDINGS: Cardiomediastinal silhouette is within normal limits. Lung volumes are slightly low. There are indistinct alveolar and reticular opacities at the left lung base. Soft tissues and osseous structures are unremarkable. Left lower lung atelectasis/infiltrate. **This report has been created using voice recognition software. It may contain minor errors which are inherent in voice recognition technology. ** Final report electronically signed by Dr. Sun Leyva on 1/26/2023 6:21 PM    CTA CHEST W WO CONTRAST    Result Date: 1/27/2023  Exam: CTA Chest with IV contrast. Procedure: Coronal and sagittal MIP reformats were performed Comparison: Chest x-ray 1/26/2023 Clinical history: Elevated d-dimer with tachycardia. Nephrolithiasis and septicemia Findings: Evaluation is limited by motion artifact No pulmonary emboli. No thoracic aortic aneurysm or dissection. No hilar or mediastinal adenopathy. No pericardial fluid collection. No pleural fluid collections. No pneumothorax Bilateral perihilar groundglass opacities may represent pulmonary edema or pneumonia. No focal area of consolidation Left-sided  shunt Visualized liver and spleen do not demonstrate any acute process Prior cholecystectomy No thoracic compression fractures     Impression: Evaluation of the by motion 1. No pulmonary emboli. 2. No thoracic aortic aneurysm or dissection 3. Bilateral perihilar groundglass opacities may represent pulmonary edema or atypical pneumonia. No focal area of consolidation This document has been electronically signed by: Ira Fletcher MD on 01/27/2023 02:19 AM All CTs at this facility use dose modulation techniques and iterative reconstructions, and/or weight-based dosing when appropriate to reduce radiation to a low as reasonably achievable. 3D Post-processing was performed on this study.     CT ABDOMEN PELVIS W IV CONTRAST Additional Contrast? None    Result Date: 1/26/2023  PROCEDURE: CT ABDOMEN PELVIS W IV CONTRAST CLINICAL INFORMATION: Right-sided abdominal pain. COMPARISON: CT abdomen and pelvis 7/16/2017. Gallbladder ultrasound 7/16/2017. TECHNIQUE: Axial 5 mm CT images were obtained through the abdomen and pelvis after the administration of 80  cc Isovue 370 intravenous contrast. Coronal and sagittal reconstructions were obtained. All CT scans at this facility use dose modulation, iterative reconstruction, and/or weight-based dosing when appropriate to reduce radiation dose to as low as reasonably achievable. FINDINGS: Lung bases: Dependent atelectasis is present. Liver/gallbladder/bilary tree: The gallbladder is surgically absent. No biliary ductal dilatation is observed. Hepatic steatosis appears unchanged. Pancreas: The evaluation is limited secondary to patient motion. Spleen : Unremarkable accounting for patient motion. Adrenal glands: Unremarkable accounting for patient motion. Kidneys/ ureters/ bladder: A 3 mm obstructing calculus at the right ureterovesicular junction (series 2, image 78) resultant mild right hydronephrosis and right hydroureter. The right renal pelvis and proximal right ureter are enhancing with moderate perinephric fat stranding and inflammation observed. Patchy diminished enhancement of the right renal parenchyma is visualized. The left kidney is unremarkable. The urinary bladder is partially distended and grossly unremarkable. Gastrointestinal:  A  shunt in the right upper quadrant appears intact. No bowel obstruction, free fluid, fluid collection, or free air is observed. The appendix contains fluid however no periappendiceal fat stranding or inflammation is observed. A moderate sliding-type hiatal hernia appears stable. Retroperitoneum / lymph nodes: The aorta is not dilated. No lymphadenopathy is visualized. Pelvis: No adnexal lesions are observed. A 10 mm follicle is seen within the right ovary. Musculoskeletal: The visualized skeletal structures appear intact.      1. A 3 mm obstructing calculus at the right ureterovesicular junction results in mild right hydronephrosis and hydroureter with moderate right perinephric stranding and inflammation. Diminished enhancement of the right renal parenchyma as well as enhancement of the right renal pelvis and proximal right ureter suggest pyelonephritis. Correlate with urinalysis. 2. The appendix measures 7 mm in diameter and contains fluid however there is no periappendiceal fat stranding or inflammation to suggest acute appendicitis. 3. Limited examination secondary to patient motion. Chronic findings are discussed. **This report has been created using voice recognition software. It may contain minor errors which are inherent in voice recognition technology. ** Final report electronically signed by Dr Jessika Leach on 1/26/2023 9:50 AM    FLUORO FOR SURGICAL PROCEDURES    Result Date: 1/26/2023  Radiology exam is complete. No Radiologist dictation. Please follow up with ordering provider.        DVT prophylaxis: [x] Lovenox                                 [] SCDs                                 [] SQ Heparin                                 [] Encourage ambulation           [] Already on Anticoagulation     Code Status: Full Code    PT/OT Eval Status: None    Tele:   [x] yes             [] No    Normal sinus rhythm      Active Hospital Problems    Diagnosis Date Noted    Septicemia ([facility] [address] [A41.9] 01/27/2023     Priority: Medium    Pyelonephritis [N12] 01/27/2023     Priority: Medium    Tachycardia [R00.0] 01/27/2023     Priority: Medium    Hypotension due to hypovolemia [I95.89, E86.1] 01/27/2023     Priority: Medium    Diarrhea [R19.7] 01/27/2023     Priority: Medium    Hypomagnesemia [E83.42] 01/27/2023     Priority: Medium    Hyponatremia [E87.1] 01/27/2023     Priority: Medium    Nephrolithiasis [N20.0] 01/26/2023     Priority: Medium    Obstruction of right ureter [N13.5] 01/26/2023     Priority: Medium       Electronically signed by DARI Ellsworth - CNP on 1/30/2023 at 9:09 PM

## 2023-01-31 LAB
ALBUMIN SERPL BCG-MCNC: 3 G/DL (ref 3.5–5.1)
ALP SERPL-CCNC: 117 U/L (ref 38–126)
ALT SERPL W/O P-5'-P-CCNC: 44 U/L (ref 11–66)
ANION GAP SERPL CALC-SCNC: 7 MEQ/L (ref 8–16)
AST SERPL-CCNC: 53 U/L (ref 5–40)
BACTERIA BLD AEROBE CULT: NORMAL
BACTERIA BLD AEROBE CULT: NORMAL
BACTERIA UR CULT: NORMAL
BASOPHILS ABSOLUTE: 0.1 THOU/MM3 (ref 0–0.1)
BASOPHILS NFR BLD AUTO: 0.8 %
BILIRUB SERPL-MCNC: 0.5 MG/DL (ref 0.3–1.2)
BUN SERPL-MCNC: 4 MG/DL (ref 7–22)
CALCIUM SERPL-MCNC: 8.6 MG/DL (ref 8.5–10.5)
CHLORIDE SERPL-SCNC: 104 MEQ/L (ref 98–111)
CO2 SERPL-SCNC: 29 MEQ/L (ref 23–33)
CREAT SERPL-MCNC: 0.8 MG/DL (ref 0.4–1.2)
DEPRECATED RDW RBC AUTO: 44.6 FL (ref 35–45)
EOSINOPHIL NFR BLD AUTO: 3.6 %
EOSINOPHILS ABSOLUTE: 0.2 THOU/MM3 (ref 0–0.4)
ERYTHROCYTE [DISTWIDTH] IN BLOOD BY AUTOMATED COUNT: 13.9 % (ref 11.5–14.5)
GFR SERPL CREATININE-BSD FRML MDRD: > 60 ML/MIN/1.73M2
GLUCOSE SERPL-MCNC: 139 MG/DL (ref 70–108)
HCT VFR BLD AUTO: 39.2 % (ref 37–47)
HGB BLD-MCNC: 12.6 GM/DL (ref 12–16)
IMM GRANULOCYTES # BLD AUTO: 0.05 THOU/MM3 (ref 0–0.07)
IMM GRANULOCYTES NFR BLD AUTO: 0.8 %
LYMPHOCYTES ABSOLUTE: 2.3 THOU/MM3 (ref 1–4.8)
LYMPHOCYTES NFR BLD AUTO: 35.7 %
MAGNESIUM SERPL-MCNC: 1.8 MG/DL (ref 1.6–2.4)
MCH RBC QN AUTO: 28.4 PG (ref 26–33)
MCHC RBC AUTO-ENTMCNC: 32.1 GM/DL (ref 32.2–35.5)
MCV RBC AUTO: 88.5 FL (ref 81–99)
MONOCYTES ABSOLUTE: 0.7 THOU/MM3 (ref 0.4–1.3)
MONOCYTES NFR BLD AUTO: 10 %
NEUTROPHILS NFR BLD AUTO: 49.1 %
NRBC BLD AUTO-RTO: 0 /100 WBC
PATHOLOGIST REVIEW: ABNORMAL
PHOSPHATE SERPL-MCNC: 2.4 MG/DL (ref 2.4–4.7)
PLATELET # BLD AUTO: 323 THOU/MM3 (ref 130–400)
PMV BLD AUTO: 11.5 FL (ref 9.4–12.4)
POTASSIUM SERPL-SCNC: 3.5 MEQ/L (ref 3.5–5.2)
PROT SERPL-MCNC: 5.9 G/DL (ref 6.1–8)
RBC # BLD AUTO: 4.43 MILL/MM3 (ref 4.2–5.4)
SCAN OF BLOOD SMEAR: NORMAL
SEGMENTED NEUTROPHILS ABSOLUTE COUNT: 3.2 THOU/MM3 (ref 1.8–7.7)
SODIUM SERPL-SCNC: 140 MEQ/L (ref 135–145)
VARIANT LYMPHS BLD QL SMEAR: ABNORMAL %
WBC # BLD AUTO: 6.5 THOU/MM3 (ref 4.8–10.8)

## 2023-01-31 PROCEDURE — 1200000003 HC TELEMETRY R&B

## 2023-01-31 PROCEDURE — 2580000003 HC RX 258

## 2023-01-31 PROCEDURE — 85025 COMPLETE CBC W/AUTO DIFF WBC: CPT

## 2023-01-31 PROCEDURE — 6370000000 HC RX 637 (ALT 250 FOR IP)

## 2023-01-31 PROCEDURE — 99232 SBSQ HOSP IP/OBS MODERATE 35: CPT

## 2023-01-31 PROCEDURE — 2700000000 HC OXYGEN THERAPY PER DAY

## 2023-01-31 PROCEDURE — 6360000002 HC RX W HCPCS

## 2023-01-31 PROCEDURE — 94760 N-INVAS EAR/PLS OXIMETRY 1: CPT

## 2023-01-31 PROCEDURE — 36415 COLL VENOUS BLD VENIPUNCTURE: CPT

## 2023-01-31 PROCEDURE — 97530 THERAPEUTIC ACTIVITIES: CPT

## 2023-01-31 PROCEDURE — 97165 OT EVAL LOW COMPLEX 30 MIN: CPT

## 2023-01-31 PROCEDURE — 94669 MECHANICAL CHEST WALL OSCILL: CPT

## 2023-01-31 PROCEDURE — 97535 SELF CARE MNGMENT TRAINING: CPT

## 2023-01-31 PROCEDURE — 84100 ASSAY OF PHOSPHORUS: CPT

## 2023-01-31 PROCEDURE — 83735 ASSAY OF MAGNESIUM: CPT

## 2023-01-31 PROCEDURE — 97161 PT EVAL LOW COMPLEX 20 MIN: CPT

## 2023-01-31 PROCEDURE — 80053 COMPREHEN METABOLIC PANEL: CPT

## 2023-01-31 RX ADMIN — SODIUM CHLORIDE, PRESERVATIVE FREE 10 ML: 5 INJECTION INTRAVENOUS at 21:11

## 2023-01-31 RX ADMIN — FUROSEMIDE 40 MG: 10 INJECTION, SOLUTION INTRAMUSCULAR; INTRAVENOUS at 09:07

## 2023-01-31 RX ADMIN — SODIUM CHLORIDE, PRESERVATIVE FREE 10 ML: 5 INJECTION INTRAVENOUS at 09:10

## 2023-01-31 RX ADMIN — Medication 1 CAPSULE: at 12:05

## 2023-01-31 RX ADMIN — PIPERACILLIN AND TAZOBACTAM 3375 MG: 3; .375 INJECTION, POWDER, FOR SOLUTION INTRAVENOUS at 09:00

## 2023-01-31 RX ADMIN — PIPERACILLIN AND TAZOBACTAM 3375 MG: 3; .375 INJECTION, POWDER, FOR SOLUTION INTRAVENOUS at 01:39

## 2023-01-31 RX ADMIN — ENOXAPARIN SODIUM 40 MG: 100 INJECTION SUBCUTANEOUS at 21:12

## 2023-01-31 RX ADMIN — Medication 1 CAPSULE: at 17:05

## 2023-01-31 RX ADMIN — Medication 1 CAPSULE: at 09:07

## 2023-01-31 RX ADMIN — PIPERACILLIN AND TAZOBACTAM 3375 MG: 3; .375 INJECTION, POWDER, FOR SOLUTION INTRAVENOUS at 16:00

## 2023-01-31 NOTE — CARE COORDINATION
1/31/23, 1:31 PM EST    DISCHARGE ON GOING EVALUATION    [address] day: 5  Location: -11/011-A Reason for admit: Nephrolithiasis [N20.0]  Septicemia ([address] [A41.9]  Obstruction of right ureteropelvic junction (UPJ) due to stone [N20.1]  Obstruction of right ureter [N13.5]   Procedure: 1/26 Cysto with right stent insertion  Barriers to Discharge: Transferred from . Weaned to 2L, 97%. Remains on IV zosyn. PT/OT. Home oxygen eval pending. PCP: DARI Camp CNP  Readmission Risk Score: 6.4%  Patient Goals/Plan/Treatment Preferences: return home with mother.

## 2023-01-31 NOTE — PROGRESS NOTES
[address] [address]  INPATIENT PHYSICAL THERAPY  EVALUATION  [facility] RENAL TELEMETRY 6K - 6K-11/011-A    Time In: 3975  Time Out: 6845  Timed Code Treatment Minutes: 15 Minutes  Minutes: 23          Date: 2023  Patient Name: Romayne Berkeley,  Gender:  female        MRN: [id number]  : 2000  (23 y.o.)      Referring Practitioner: DARI Hoang CNP  Diagnosis: Nephrolithiasis  Additional Pertinent Hx: Per EMR Xiomara Naik is a pleasant 23 y.o. female who presents to the emergency department for evaluation of vomiting. States she passed a kidney stone yesterday but has not been able to stop vomiting since  4 episodes of nonbilious nonbloody emesis yesterday, 3 more today  Unsure about fever--\"I felt hot\"  RLQ abd pain yesterday that resolved and soon thereafter she states she passed a kidney stone  No dysuria or hematuria; no vaginal bleeding or dc. LMP 23 on time\" Pt is s/p Cystoscopy Right Stent Insertion     Restrictions/Precautions:  Restrictions/Precautions: General Precautions  Position Activity Restriction  Other position/activity restrictions: monitor O2    Subjective:  Chart Reviewed: Yes  Patient assessed for rehabilitation services?: Yes  Family / Caregiver Present: No  Subjective: OK to see pt per nursing. Pt in bathroom with tech present when PT arrived, as tech reports pt urinated in the bed and assisted with bathing task. Pt agreeable to PT session, however, agitated and not the most pleasant during session.     General:  Overall Orientation Status: Within Normal Limits  Orientation Level: Oriented X4  Vision: Within Functional Limits  Hearing: Within functional limits       Pain: denies    Vitals: Vitals not assessed per clinical judgement, see nursing flowsheet    Social/Functional History:    Lives With: Parent  Type of Home: House  Home Layout: Two level, Performs ADL's on one level  Home Access: Stairs to enter with rails  Entrance Stairs - Number of Steps: \"I don't know\"             ADL Assistance: Independent  Homemaking Assistance: Needs assistance  Ambulation Assistance: Independent  Transfer Assistance: Independent    Active : No  Type of Occupation: stock for [employer]  Additional Comments: IND and active    OBJECTIVE:  Range of Motion:  Bilateral Lower Extremity: WNL    Strength:  Bilateral Lower Extremity: WNL    Balance:  Static Sitting Balance:  Independent  Dynamic Sitting Balance: Supervision  Static Standing Balance: Supervision, Stand By Assistance  Dynamic Standing Balance: Stand By Assistance  Pt assisted with sponge bath sitting on toilet, set up required, assisted with back. Pt able to byron and doff brief, assisted with donning and doffing gown. Pt completed toileting tasks x2 as pt sat in chair and had to urinate again.   Bed Mobility:  Not Tested    Transfers:  Sit to Stand: Stand By Assistance, X 1  Stand to Sit:Supervision, Stand By Assistance, X 1  Completed transfers from various surfaces and heights. Pt aggressively sits down due to increased agitation   Ambulation:  Supervision, Stand By Assistance, X 1  Distance: 15 feet x3  Surface: Level Tile  Device: RW for first trial, no AD for 2nd and 3rd trial  Gait Deviations:  Decreased Step Length Bilaterally and Decreased Gait Speed  No LOB noted, max A for IV pole and O2 management.   Exercise:  Patient declined    Functional Outcome Measures: Completed  AM-PAC Inpatient Mobility Raw Score : 18  AM-PAC Inpatient T-Scale Score : 43.63    ASSESSMENT:  Activity Tolerance:  Patient tolerance of  treatment: fair. Due to motivation for session and agitation during session and limited participation.     Treatment Initiated: Treatment and education initiated within context of evaluation.  Evaluation time included review of current medical information, gathering information related to past medical, social and functional history, completion of standardized testing, formal and informal observation of  tasks, assessment of data and development of plan of care and goals. Treatment time included skilled education and facilitation of tasks to increase safety and independence with functional mobility for improved independence and quality of life. Assessment: Body Structures, Functions, Activity Limitations Requiring Skilled Therapeutic Intervention: Decreased functional mobility , Decreased endurance  Assessment: Yuko Marmolejo is a 23 y.o. female who presents with the deficits stated previously. Pt requires 1 person assist for functional tasks with use of walker and no AD for support. Pt cont to require skilled PT services to increase IND with functional tasks and progress towards PLOF to return to home environment safely. Therapy Prognosis: Good    Requires PT Follow-Up: Yes    Discharge Recommendations:  Discharge Recommendations: Home with assist PRN    Patient Education:      . Patient Education  Education Given To: Patient  Education Provided: Role of Therapy, Plan of Care  Education Method: Verbal  Education Outcome: Verbalized understanding, Demonstrated understanding, Continued education needed       Equipment Recommendations:  Equipment Needed: No    Plan:  Current Treatment Recommendations: Strengthening, Gait training, Stair training, Functional mobility training, Balance training, Transfer training, Neuromuscular re-education, Endurance training, Equipment evaluation, education, & procurement, Patient/Caregiver education & training, Safety education & training, Therapeutic activities, Home exercise program  General Plan:  (2-3x GM)    Goals:  Patient Goals : return home  Short Term Goals  Time Frame for Short Term Goals: by discharge  Short Term Goal 1: Pt will demo sit to/from stand transfers with no AD with IND to return home safely. Short Term Goal 2: Pt will amb for >200 feet with IND with no AD to return home safely.   Short Term Goal 3: Pt will negotiate steps for CRISTOPHER the home with IND to return home safely.  Long Term Goals  Time Frame for Long Term Goals : NA due to short ELOS    Following session, patient left in safe position with all fall risk precautions in place. Pt in bedside chair following session, all needs and call light in reach, alarm on.

## 2023-01-31 NOTE — PROGRESS NOTES
[facility] [address] [facility]  INPATIENT OCCUPATIONAL THERAPY  STRZ RENAL TELEMETRY 6K  EVALUATION    Time:    Time In: 1577  Time Out: 1145  Timed Code Treatment Minutes: 15 Minutes  Minutes: 24          Date: 2023  Patient Name: Beverley Mcgovern,   Gender: female      MRN: [id number]  : 2000  (23 y.o.)  Referring Practitioner: DARI Delaney - CNP  Diagnosis: Nephrolithiasis  Additional Pertinent Hx: Beverley Mcgovern is a 23 y.o. female with PMHx of hydrocephalus and prematurity who presented to [facility] with chief complaint of emesis. Patient reports she started vomiting yesterday with no associated nausea. States she could not keep anything down and continued to vomit today prompting her to come to the ER. She reports no associated pain with the vomiting. States she does not have abdominal pain, no urinary burning/discomfort/hesitancy/ frequency. States she does feel chilled and lightheaded with standing. Denies any headache, chest pain, palpitations, shortness of breath, and abdominal pain. Restrictions/Precautions:  Restrictions/Precautions: General Precautions  Position Activity Restriction  Other position/activity restrictions: monitor O2    Subjective  Chart Reviewed: Yes, Orders, Progress Notes  Patient assessed for rehabilitation services?: Yes    Subjective: RN okayed session. Pt resting in bed upon arrival, agreeable to OT with encouragement. Pain: Denies    Vitals: Oxygen: Pt on 2L O2, maintaining around 93% with HH distance mobility    Social/Functional History:  Lives With: Parent  Type of Home: House  Home Layout: Two level, Performs ADL's on one level  Home Access: Stairs to enter with rails  Entrance Stairs - Number of Steps: \"I don't know\"           ADL Assistance: Independent  Homemaking Assistance: Needs assistance  Ambulation Assistance: Independent  Transfer Assistance: Independent    Active : No  Patient's  Info:  Hawa Beard picks up- works at [employer]  of Occupation: stock for hospitals  Additional Comments: IND and active    VISION:WFL    HEARING:  WFL    COGNITION: Slow Processing, Decreased Insight, Decreased Problem Solving, and Decreased Safety Awareness    RANGE OF MOTION:  Bilateral Upper Extremity:  WFL    STRENGTH:  Bilateral Upper Extremity:  grossly deconditioned    SENSATION:   WFL    ADL:   Grooming: Stand By Assistance. Washing hands in standing  Upper Extremity Dressing: Minimal Assistance. Gown mgmt  Lower Extremity Dressing: Stand By Assistance. Pt required max VC for attempting completion indep, initially reporting \"I can't do this it's just too hard\". Pt completed safely and effectively after encouragement provided. Footwear Management: Stand By Assistance. Donning socks. Pt required max VC for attempting completion indep, initially reporting \"I can't do this it's just too hard\". Pt completed safely and effectively after encouragement provided   Toileting: Stand By Assistance. Pt reported she was incontinent of urine upon arrival, requesting to use bathroom and clean up. Toilet Transfer: Stand By Assistance. Ansley Mcgill BALANCE:  Sitting Balance:  Supervision. Standing Balance: Stand By Assistance. BED MOBILITY:  Supine to Sit: Supervision    Sit to Supine: Supervision    Scooting: Supervision      TRANSFERS:  Sit to Stand:  Supervision. Stand to Sit: Supervision. FUNCTIONAL MOBILITY:  Assistive Device: None  Assist Level:  Stand By Assistance. Distance: [facility] and from [facility] and [facility] distances within unit  Pt reporting feeling lightheaded at times; however, no sway or unsteadiness. Activity Tolerance:  Patient tolerance of  treatment: fair. Required encouragement and max education for indep participation      Assessment:  Assessment: Pt presented with the listed deficits s/p admission with nephrolithiasis. Pt with decreased endurance and activity tolerance and currently requires increased A for ADLs and IADLs.  Pt would benefit from continued OT to restore PLOF maximize pt's indep with ADLs and IADLs in prep for a safe return home at max level of indep. Performance deficits / Impairments: Decreased functional mobility , Decreased cognition, Decreased high-level IADLs, Decreased endurance, Decreased balance  Prognosis: Good  REQUIRES OT FOLLOW-UP: Yes  Decision Making: Low Complexity    Treatment Initiated: Treatment and education initiated within context of evaluation. Evaluation time included review of current medical information, gathering information related to past medical, social and functional history, completion of standardized testing, formal and informal observation of tasks, assessment of data and development of plan of care and goals. Treatment time included skilled education and facilitation of tasks to increase safety and independence with ADL's for improved functional independence and quality of life. Discharge Recommendations:  Home with assist PRN    Patient Education:     Patient Education  Education Given To: Patient  Education Provided: Role of Therapy, Plan of Care, ADL Adaptive Strategies, Transfer Training, Energy Conservation  Education Method: Demonstration  Barriers to Learning: None  Education Outcome: Verbalized understanding, Demonstrated understanding, Continued education needed    Equipment Recommendations:  Equipment Needed: No    Plan:  Times Per Week: 2-3x  Current Treatment Recommendations: Strengthening, Balance training, Functional mobility training, Endurance training, Patient/Caregiver education & training, Self-Care / ADL. See long-term goal time frame for expected duration of plan of care. If no long-term goals established, a short length of stay is anticipated.     Goals:  Patient goals : return home  Short Term Goals  Time Frame for Short Term Goals: by discharge  Short Term Goal 1: Pt will safely navigate HH distances with Mod I and no LOB or safety cues to increase indep with ADLs  Short Term Goal 2: Pt will tolerate dynamic standing x8-10min with Mod I and no UE support to increase indep with grooming  Short Term Goal 3: Pt will complete BADL routine with ECT prn to maintain O2 >90% for increased indep with dressing         Following session, patient left in safe position with all fall risk precautions in place.

## 2023-01-31 NOTE — PROGRESS NOTES
01/31/23 1400   Encounter Summary   Encounter Overview/Reason  Spiritual/Emotional Needs   Service Provided For: Patient and family together   Referral/Consult From: S[facility] 31   Last Encounter  01/31/23   Complexity of Encounter Low   Assessment/Intervention/Outcome   Assessment Calm;Coping   Intervention Sustaining Presence/Ministry of presence;Nurtured Hope   Outcome Comfort   Pt is on the phone. She had a friend in the room with her. Prayer card given to her. Follow up is needed.

## 2023-02-01 VITALS
HEART RATE: 98 BPM | DIASTOLIC BLOOD PRESSURE: 64 MMHG | OXYGEN SATURATION: 93 % | BODY MASS INDEX: 36.11 KG/M2 | TEMPERATURE: 98.2 F | RESPIRATION RATE: 18 BRPM | HEIGHT: 62 IN | SYSTOLIC BLOOD PRESSURE: 112 MMHG | WEIGHT: 196.21 LBS

## 2023-02-01 LAB
ALBUMIN SERPL BCG-MCNC: 3.7 G/DL (ref 3.5–5.1)
ALP SERPL-CCNC: 153 U/L (ref 38–126)
ALT SERPL W/O P-5'-P-CCNC: 143 U/L (ref 11–66)
ANION GAP SERPL CALC-SCNC: 16 MEQ/L (ref 8–16)
AST SERPL-CCNC: 172 U/L (ref 5–40)
BACTERIA BLD AEROBE CULT: NORMAL
BACTERIA BLD AEROBE CULT: NORMAL
BASOPHILS ABSOLUTE: 0.1 THOU/MM3 (ref 0–0.1)
BASOPHILS NFR BLD AUTO: 1.2 %
BILIRUB SERPL-MCNC: 0.6 MG/DL (ref 0.3–1.2)
BUN SERPL-MCNC: 7 MG/DL (ref 7–22)
CALCIUM SERPL-MCNC: 8.7 MG/DL (ref 8.5–10.5)
CHLORIDE SERPL-SCNC: 99 MEQ/L (ref 98–111)
CO2 SERPL-SCNC: 23 MEQ/L (ref 23–33)
CREAT SERPL-MCNC: 0.8 MG/DL (ref 0.4–1.2)
DEPRECATED RDW RBC AUTO: 46.5 FL (ref 35–45)
EOSINOPHIL NFR BLD AUTO: 3.2 %
EOSINOPHILS ABSOLUTE: 0.3 THOU/MM3 (ref 0–0.4)
ERYTHROCYTE [DISTWIDTH] IN BLOOD BY AUTOMATED COUNT: 14.3 % (ref 11.5–14.5)
GFR SERPL CREATININE-BSD FRML MDRD: > 60 ML/MIN/1.73M2
GLUCOSE SERPL-MCNC: 118 MG/DL (ref 70–108)
HCT VFR BLD AUTO: 44.5 % (ref 37–47)
HGB BLD-MCNC: 14.5 GM/DL (ref 12–16)
IMM GRANULOCYTES # BLD AUTO: 0.08 THOU/MM3 (ref 0–0.07)
IMM GRANULOCYTES NFR BLD AUTO: 0.8 %
LYMPHOCYTES ABSOLUTE: 3.1 THOU/MM3 (ref 1–4.8)
LYMPHOCYTES NFR BLD AUTO: 32.8 %
MCH RBC QN AUTO: 29.4 PG (ref 26–33)
MCHC RBC AUTO-ENTMCNC: 32.6 GM/DL (ref 32.2–35.5)
MCV RBC AUTO: 90.3 FL (ref 81–99)
MONOCYTES ABSOLUTE: 0.5 THOU/MM3 (ref 0.4–1.3)
MONOCYTES NFR BLD AUTO: 5.4 %
NEUTROPHILS NFR BLD AUTO: 56.6 %
NRBC BLD AUTO-RTO: 0 /100 WBC
PLATELET # BLD AUTO: 479 THOU/MM3 (ref 130–400)
PMV BLD AUTO: 11.4 FL (ref 9.4–12.4)
POTASSIUM SERPL-SCNC: 3.9 MEQ/L (ref 3.5–5.2)
PROT SERPL-MCNC: 6.7 G/DL (ref 6.1–8)
RBC # BLD AUTO: 4.93 MILL/MM3 (ref 4.2–5.4)
SEGMENTED NEUTROPHILS ABSOLUTE COUNT: 5.4 THOU/MM3 (ref 1.8–7.7)
SODIUM SERPL-SCNC: 138 MEQ/L (ref 135–145)
WBC # BLD AUTO: 9.5 THOU/MM3 (ref 4.8–10.8)

## 2023-02-01 PROCEDURE — 2580000003 HC RX 258

## 2023-02-01 PROCEDURE — 85025 COMPLETE CBC W/AUTO DIFF WBC: CPT

## 2023-02-01 PROCEDURE — 6360000002 HC RX W HCPCS

## 2023-02-01 PROCEDURE — 6370000000 HC RX 637 (ALT 250 FOR IP)

## 2023-02-01 PROCEDURE — 36415 COLL VENOUS BLD VENIPUNCTURE: CPT

## 2023-02-01 PROCEDURE — 80053 COMPREHEN METABOLIC PANEL: CPT

## 2023-02-01 RX ORDER — CEFDINIR 300 MG/1
300 CAPSULE ORAL 2 TIMES DAILY
Qty: 10 CAPSULE | Refills: 0 | Status: SHIPPED | OUTPATIENT
Start: 2023-02-01 | End: 2023-02-06

## 2023-02-01 RX ORDER — ONDANSETRON 4 MG/1
4 TABLET, ORALLY DISINTEGRATING ORAL EVERY 8 HOURS PRN
Qty: 12 TABLET | Refills: 0 | Status: SHIPPED | OUTPATIENT
Start: 2023-02-01

## 2023-02-01 RX ADMIN — Medication 1 CAPSULE: at 08:15

## 2023-02-01 RX ADMIN — PIPERACILLIN AND TAZOBACTAM 3375 MG: 3; .375 INJECTION, POWDER, FOR SOLUTION INTRAVENOUS at 01:45

## 2023-02-01 RX ADMIN — SODIUM CHLORIDE, PRESERVATIVE FREE 10 ML: 5 INJECTION INTRAVENOUS at 08:15

## 2023-02-01 ASSESSMENT — PAIN SCALES - GENERAL: PAINLEVEL_OUTOF10: 10

## 2023-02-01 NOTE — PLAN OF CARE
Problem: Discharge Planning  Goal: Discharge to home or other facility with appropriate resources  1/31/2023 2158 by Kaylie Perez RN  Outcome: Progressing  Flowsheets (Taken 1/31/2023 2116)  Discharge to home or other facility with appropriate resources: Identify barriers to discharge with patient and caregiver  1/31/2023 1913 by Daisy Pryor RN  Outcome: Progressing  Flowsheets (Taken 1/31/2023 0854)  Discharge to home or other facility with appropriate resources:   Arrange for needed discharge resources and transportation as appropriate   Identify barriers to discharge with patient and caregiver     Problem: Safety - Adult  Goal: Free from fall injury  1/31/2023 2158 by Kaylie Perez RN  Outcome: Progressing  1/31/2023 1913 by Daisy Pryor RN  Outcome: Progressing  Flowsheets (Taken 1/31/2023 1052)  Free From Fall Injury: Instruct family/caregiver on patient safety     Problem: Respiratory - Adult  Goal: Achieves optimal ventilation and oxygenation  1/31/2023 2158 by Kaylie Perez RN  Outcome: Progressing  Flowsheets (Taken 1/31/2023 2116)  Achieves optimal ventilation and oxygenation: Assess for changes in respiratory status  1/31/2023 1913 by Daisy Pryor RN  Outcome: Progressing  Flowsheets (Taken 1/31/2023 0854)  Achieves optimal ventilation and oxygenation: Assess for changes in respiratory status     Problem: Cardiovascular - Adult  Goal: Maintains optimal cardiac output and hemodynamic stability  1/31/2023 2158 by Kaylie Perez RN  Outcome: Progressing  1/31/2023 1913 by Daisy Pryor RN  Outcome: Progressing  Flowsheets (Taken 1/31/2023 0854)  Maintains optimal cardiac output and hemodynamic stability: Monitor blood pressure and heart rate  Goal: Absence of cardiac dysrhythmias or at baseline  1/31/2023 2158 by Kaylie Perez RN  Outcome: Progressing  1/31/2023 1913 by Daisy Pryor RN  Outcome: Progressing  Flowsheets (Taken 1/31/2023 0854)  Absence of cardiac dysrhythmias or at baseline:   Assess for signs of decreased cardiac output   Monitor cardiac rate and rhythm     Problem: Infection - Adult  Goal: Absence of infection at discharge  1/31/2023 2158 by Victorina Calhoun RN  Outcome: Progressing  Flowsheets (Taken 1/31/2023 2116)  Absence of infection at discharge: Assess and monitor for signs and symptoms of infection  1/31/2023 1913 by Kenan Enriquez RN  Outcome: Progressing  Flowsheets (Taken 1/31/2023 0854)  Absence of infection at discharge: Assess and monitor for signs and symptoms of infection  Goal: Absence of infection during hospitalization  1/31/2023 2158 by Victorina Calhoun RN  Outcome: Progressing  [address] (Taken 1/31/2023 2116)  Absence of infection during hospitalization: Assess and monitor for signs and symptoms of infection  1/31/2023 1913 by Kenan Enriquez RN  Outcome: Progressing  Flowsheets (Taken 1/31/2023 0854)  Absence of infection during hospitalization: Assess and monitor for signs and symptoms of infection  Goal: Absence of fever/infection during anticipated neutropenic period  1/31/2023 2158 by Victorina Calhoun RN  Outcome: Progressing  [address] (Taken 1/31/2023 2116)  Absence of fever/infection during anticipated neutropenic period: Monitor white blood cell count  1/31/2023 1913 by Kenan Enriquez RN  Outcome: Progressing  Flowsheets (Taken 1/31/2023 0854)  Absence of fever/infection during anticipated neutropenic period: Monitor white blood cell count     Problem: Pain  Goal: Verbalizes/displays adequate comfort level or baseline comfort level  1/31/2023 2158 by Victorina Calhoun RN  Outcome: Progressing  1/31/2023 1913 by Kenan Enriquez RN  Outcome: Progressing  Flowsheets (Taken 1/31/2023 0815)  Verbalizes/displays adequate comfort level or baseline comfort level: Encourage patient to monitor pain and request assistance     Problem: Skin/Tissue Integrity  Goal: Absence of new skin breakdown  Description: 1.   Monitor for areas of redness and/or skin breakdown  2. Assess vascular access sites hourly  3. Every 4-6 hours minimum:  Change oxygen saturation probe site  4. Every 4-6 hours:  If on nasal continuous positive airway pressure, respiratory therapy assess nares and determine need for appliance change or resting period.   1/31/2023 2158 by Tiara Man RN  Outcome: Progressing  1/31/2023 1913 by Joya Rossi RN  Outcome: Progressing

## 2023-02-01 NOTE — DISCHARGE SUMMARY
Hospital Medicine Discharge Summary      Patient Identification:   Beverley Mcgovern   : 2000  MRN: [id number]   Account: [de-identified]      Patient's PCP: DARI Cerna CNP    Admit Date: 2023     Discharge Date: 23    Admitting Physician: Jason Syed MD     Discharge Physician: Yenny Bernal PA-C     Beverley Mcgovern is a 23 y.o. female admitted to [address] [place] [address] on 2023. HPI On Admission From H&P:    Bushra Cha is a 23 y.o. female with PMHx of hydrocephalus and prematurity who presented to [facility] with chief complaint of emesis. Patient reports she started vomiting yesterday with no associated nausea. States she could not keep anything down and continued to vomit today prompting her to come to the ER. She reports no associated pain with the vomiting. States she does not have abdominal pain, no urinary burning/discomfort/hesitancy/ frequency. States she does feel chilled and lightheaded with standing. Denies any headache, chest pain, palpitations, shortness of breath, and abdominal pain. \"      Assessment/Plan With Discharge Diagnoses:        Patient discharged with Omnicef and Zofran. She may f/u with Urology in the outpatient setting. Hospital Course:         Exam:     Vitals:  Vitals:    23 2336 23 0315 23 0800 23 1205   BP: (!) 139/90 135/88 113/60 112/64   Pulse: 95 89 82 98   Resp: 20 20 18 18   Temp: 99.7 °F (37.6 °C) 98.2 °F (36.8 °C)  98.2 °F (36.8 °C)   TempSrc: Oral Oral Oral Oral   SpO2: 95% 96% 92% 93%   Weight:       Height:         Weight: Weight: 196 lb 3.4 oz (89 kg)     24 hour intake/output:  Intake/Output Summary (Last 24 hours) at 2023 1223  Last data filed at 2023 0647  Gross per 24 hour   Intake 988.65 ml   Output 3072 ml   Net -2083.35 ml         Labs:  For convenience and continuity at follow-up the following most recent labs are provided:    CBC:    Lab Results   Component Value Date/Time WBC 9.5 02/01/2023 04:48 AM    HGB 14.5 02/01/2023 04:48 AM    HCT 44.5 02/01/2023 04:48 AM     02/01/2023 04:48 AM       Renal:    Lab Results   Component Value Date/Time     02/01/2023 06:15 AM    K 3.9 02/01/2023 06:15 AM    CL 99 02/01/2023 06:15 AM    CO2 23 02/01/2023 06:15 AM    BUN 7 02/01/2023 06:15 AM    CREATININE 0.8 02/01/2023 06:15 AM    CALCIUM 8.7 02/01/2023 06:15 AM    PHOS 2.4 01/31/2023 05:31 AM         Significant Diagnostic Studies    Radiology:   XR CHEST PORTABLE   Final Result   1. Mild cardiomegaly. A ventriculoperitoneal shunt tube projects over left side of chest. Evidence for prior cholecystectomy. 2. No effusion. Mild atelectasis/pneumonia both mid and lower lung fields, worse on the right. 3. Overall appearance of chest improved from prior. **This report has been created using voice recognition software. It may contain minor errors which are inherent in voice recognition technology. **      Final report electronically signed by Dr. Dyllan Sahu on 1/30/2023 2:01 PM      XR CHEST PORTABLE   Final Result      Pulmonary vascular congestion. Borderline cardiomegaly. This document has been electronically signed by: Sharron Krishnan MD on    01/28/2023 06:48 AM      CT ABDOMEN PELVIS W IV CONTRAST Additional Contrast? None   Final Result   1. Small bilateral pleural effusions and patchy bilateral lower lobe airspace opacities can be clinically correlated for pneumonia. 2. Patchy diminished enhancement in the right renal parenchyma suggesting pyelonephritis is not significantly changed. A right ureteral stent is present. Mild right-sided hydronephrosis and hydroureter has improved. 3. The appendix is normal.            **This report has been created using voice recognition software. It may contain minor errors which are inherent in voice recognition technology. **      Final report electronically signed by Dr Cordelia Whitlock on 1/27/2023 11:19 AM CTA CHEST W WO CONTRAST   Final Result   Impression:   Evaluation of the by motion   1. No pulmonary emboli. 2. No thoracic aortic aneurysm or dissection   3. Bilateral perihilar groundglass opacities may represent pulmonary edema    or atypical pneumonia. No focal area of consolidation      This document has been electronically signed by: Berenice Forrest MD on    01/27/2023 02:19 AM      All CTs at this facility use dose modulation techniques and iterative    reconstructions, and/or weight-based dosing   when appropriate to reduce radiation to a low as reasonably achievable. 3D Post-processing was performed on this study. XR CHEST (2 VW)   Final Result      Left lower lung atelectasis/infiltrate. **This report has been created using voice recognition software. It may contain minor errors which are inherent in voice recognition technology. **      Final report electronically signed by Dr. Deisy Easley on 1/26/2023 6:21 PM      FLUORO FOR SURGICAL PROCEDURES   Final Result      CT ABDOMEN PELVIS W IV CONTRAST Additional Contrast? None   Final Result   1. A 3 mm obstructing calculus at the right ureterovesicular junction results in mild right hydronephrosis and hydroureter with moderate right perinephric stranding and inflammation. Diminished enhancement of the right renal parenchyma as well as    enhancement of the right renal pelvis and proximal right ureter suggest pyelonephritis. Correlate with urinalysis. 2. The appendix measures 7 mm in diameter and contains fluid however there is no periappendiceal fat stranding or inflammation to suggest acute appendicitis. 3. Limited examination secondary to patient motion. Chronic findings are discussed. **This report has been created using voice recognition software. It may contain minor errors which are inherent in voice recognition technology. **      Final report electronically signed by Dr Catracho Szymanski on 1/26/2023 9:50 AM             Consults:     IP CONSULT TO GENERAL SURGERY  IP CONSULT TO PHARMACY    Disposition: {disposition on discharge:24595}  Condition at Discharge: [address] Patient Condition:[id number]}    Code Status:  Full Code ***    Patient Instructions:    Discharge lab work: ***  Activity: {discharge activity:46854}  Diet: ADULT DIET; Regular; GI [place] (GERD/Peptic Ulcer)      Follow-up visits:   No follow-up provider specified. Discharge Medications:        Medication List        START taking these medications      cefdinir 300 MG capsule  Commonly known as: OMNICEF  Take 1 capsule by mouth 2 times daily for 5 days     ondansetron 4 MG disintegrating tablet  Commonly known as: ZOFRAN-ODT  Take 1 tablet by mouth every 8 hours as needed for Nausea or Vomiting               Where to Get Your Medications        These medications were sent to [address] [place] [address] , [address] [facility], [facility] [address]      Phone: [phone]   cefdinir 300 MG capsule  ondansetron 4 MG disintegrating tablet          Time Spent on discharge is *** minutes in the ***examination, evaluation, counseling and*** review of medications and discharge plan. Thank you DARI Laguerre CNP for the opportunity to be involved in this patient's care.       Signed:    Electronically signed by Mendy Hoffman PA-C on 2/1/23 at 12:23 PM EST

## 2023-02-01 NOTE — PROGRESS NOTES
A home oxygen evaluation has been completed. [x]Patient is an inpatient. It is expected that the patient will be discharged within the next 48 hours. Qualified provider to write orders for possible sleep study or home oxygen prescription. Social service/care managers will arrange for home oxygen if ordered. If patient is active, arrange for Home Medical supplier to assess for Oxygen Conserving Device per pulse oximetry. []Patient is an outpatient. Results will be faxed to the ordering provider. Qualified provider to write orders for possible sleep study or home oxygen prescription. Patient was placed on room air for 20 minutes. SpO2 was 96 % on room air at rest. Patients SpO2 was 89% or above and did not qualify for home oxygen. Patient was walked for 6 minutes. SpO2 was 94 % during walking. Patients SpO2 was 89% or above and did not qualify for home oxygen. Patient does not have a positive pressure airway device at home. Patient is not  diagnosed with Obstructive Sleep Apnea. Patient will not be set up/instructed on a nocturnal study, per RN    Note: For any SpO2 at 45% see policy and procedure for possible qualifications.

## 2023-02-01 NOTE — CARE COORDINATION
2/1/23, 11:19 AM EST    Discharge to home with mother. Patient goals/plan/ treatment preferences discussed by  and . Patient goals/plan/ treatment preferences reviewed with patient/ family. Patient/ family verbalize understanding of discharge plan and are in agreement with goal/plan/treatment preferences. Understanding was demonstrated using the teach back method. AVS provided by RN at time of discharge, which includes all necessary medical information pertaining to the patients current course of illness, treatment, post-discharge goals of care, and treatment preferences.      Services At/After Discharge: None

## 2023-02-01 NOTE — PROGRESS NOTES
Hospitalist Progress Note    Patient:  Naeem Babcock      Unit/Bed:6K-11/011-A    YOB: 2000    MRN: [id number]       Acct: [de-identified]     PCP: DARI Camp CNP    Date of Admission: 1/26/2023    Assessment/Plan:    Sepsis secondary to pyelonephritis  Met 4/4 SIRS criteria (febrile 103, tachycardic 150 bpm, tachypneic 28 RRR, leukocytosis 27.1)  Organ Dysfunction: Acute hypoxic respiratory failure  Lactic acid level 1.8 (POA), has trended down and remained normal.  Received 30 cc/kg IV sepsis fluid boluses plus continuous IVF's  Received Zosyn and Rocephin in ED. Continue Zosyn (initiated 1/27)  Received Vanc 1/27 - 1/30  BC x 2 preliminary result NGTD    Obstructing nephrolithiasis  CT shows a 3mm stone at the ureterovesicular junction resultatnt mild right hydronephrosis and right hydroureter. Urology consulted in ED. S/p cystoscopy with right stent insertion 1/26/23 by Dr. Pete Tadeo. Urology following: Needs to follow up for definitive cystoscopy right ureteroscopy, possible HLL, possible stent removal versus exchange in few weeks    Pyelonephritis  CT shows right perinephric stranding and inflammation with diminished enhancement of right renal parenchyma and renal pelvis. Received zosyn and ceftriaxone in the ER. Continue Ceftriaxone and continue to monitor. UA notable for large LE, negative nitrite, moderate blood, 30 protein, trace ketones, greater than 200 WBCs, few bacteria. Urine culture showing growth of contaminants.  Repeat urine cx showing preliminary result NGTD  Continue Zosyn    Acute hypoxic respiratory failure, improving  Satting 98% on 5L/NC  Continue to wean oxygen as tolerated  CXR (POA) notable for left lower lung atelectasis/infiltrate  CTA chest negative for PE, no thoracic aortic aneurysm or dissection, bilateral perihilar groundglass opacities may represent pulmonary edema versus atypical pneumonia  Repeat CT A/P showing small bilateral pleural effusions and patchy bilateral lower lobe airspace opacities can be clinically correlated for pneumonia  ABG 1/28 okay, showing PO2 119. Respiratory viral panel negative  Strep pneumoniae and Legionella urine antigen assay test negative  Molecular pneumonia panel ordered, respiratory culture ordered  Continue IV antibiotics in # 1  Pulmonary toilet: Incentive spirometer, Acapella      Leukocytosis:   Secondary to above. Trending down from 27.1 (POA). Continue IV abx. Hypotension, resolved  Secondary to sepsis. S/p aggressive IV fluid hydration  Monitor strict I&O's    Sinus tachycardia, improved  ECG (POA) notable for sinus tachycardia, with nonspecific ST abnormality in V2 through V3, without ischemic change  Echo notable for EF 55%, mild MR, trivial NJ  Troponin negative  Suspect likely secondary to sepsis, dehydration, being febrile. S/p aggressive IV fluid administration. Continuous telemetry    Bilateral pleural effusions  Noted to be small on CT A/P (POA). Patient desatted to 86%, placed on 6L/NC with improvement. Repeat CXR 1/28 showing beginnings of pulmonary edema. Likely d/t aggressive IV hydration for sepsis. Patient placed temporarily on Bipap settings 12/6, Fio2 100% to blow fluid off.  1/29: Lung sounds clear. Possible appendicitis-ruled out  Initial CT shows 7 mm in diameter appendix contains fluid, no periappendiceal fat stranding. ER discussed with gen/surg. Gen/surg feels an acute appendicitis is unlikely at this time. Patient then had worsening RLQ pain on 1/27 with high fevers, + Rovsings, and Ileopsoas tests. Gen surg consulted and order for repeat CT placed. Repeat CT A/P showing normal appendix. Hyponatremia (POA), resolved  130 on admission. Likely 2/2 hypovolemia hyponatremia from emesis. IVF bolus and infusion.   Urine sodium 46, however this was obtained after receiving large amounts of IV fluids, not accurate measure at this time  Daily BMP  Continues IV fluids for hydration    Diarrhea, improving  Per report from patient and her mother she has been having multiple episodes prior to arrival to hospital.  Patient continues to have multiple small diarrheal bowel movements here in hospital.  Molecular GI panel negative. As needed immodium  Probiotics    History of hydrocephalus status post  shunt:   Noted per chart review, sequelae of prematurity. Hypomagnesemia:   Magnesium replacement protocol. Daily magnesium level    Hypophosphatemia  Phosphorus replacement protocol. Repeat lab in AM    Hypocalcemia  Calcium replacement protocol  Repeat lab in AM    Urinary incontinence:   Patient noted to be incontinent of urine multiple times on admission. Could be from pyelonephritis however did have formal discussion with patient's mother who reports patient is always incontinent. Dill catheter placed for strict I&O given severe sepsis and needing to monitor kidney function. Dill has since been removed. Expected discharge date: Pending clinical course    Disposition:    [x] Home       [] TCU       [] Rehab       [] Psych       [] SNF       [] [place]       [] Other-    Chief Complaint: Emesis    Hospital Course: Per HPI documented 1/26/23: Francisco Mccall is a 23 y.o. female with PMHx of hydrocephalus and prematurity who presented to [facility] with chief complaint of emesis. Patient reports she started vomiting yesterday with no associated nausea. States she could not keep anything down and continued to vomit today prompting her to come to the ER. She reports no associated pain with the vomiting. States she does not have abdominal pain, no urinary burning/discomfort/hesitancy/ frequency. States she does feel chilled and lightheaded with standing. Denies any headache, chest pain, palpitations, shortness of breath, and abdominal pain. \"    1/27/23: Resumed care patient today. Patient febrile, tachycardic, hypotensive.   She is resting in bed, washing herself up with washcloth. Complains of 10/10 right lower quadrant abdominal pain with associated nausea and vomiting. Reports that she has had multiple episodes of diarrhea today that have been nonbloody in nature. States that she feels mildly short of breath at rest.  Call placed to general surgery to notify of concerns for possible underlying appendicitis given initial CT A/P results. Repeat CT A/P obtained at recommendation of general surgery which noted a normal appendix. Patient's IV antibiotics were switched to Zosyn as she was on Rocephin only this morning. Appears that patient's IV fluids were stopped yesterday evening, will restart aggressive IV fluid hydration therapies and broad-spectrum antibiotics. Urine culture pending   - Update 1600: Patient noted to be hypotensive again 87/49 per nursing. Repeat manual blood pressure 98/52. Reassessed patient, she is resting in bed, still complaining of some 4/10 right lower quadrant abdominal pain however states is much improved. Continues to have right lower quadrant abdominal tenderness however benign abdomen no rigidity 8 appreciated. Checked I&O's in chartand only documented urine occurrences have been placed. Per nursing patient is incontinent. Called and discussed with patient's mother who confirmed this that she is incontinent at baseline. Order to place Dill catheter for strict I&O monitoring for better clinical decision making if kidney function okay. We will repeat lab work now. IV fluids increased to 150 mL/h. We will add IV vancomycin for more generalized broad-spectrum coverage given that patient still remains with high fevers, tachycardic and now hypotensive again. T-max today 102. 1. T-max since admission was 103.    1/2823: Patient febrile at 100.7, satting 93% on 6 L nasal cannula, mildly tachycardic at 103 bpm, relatively hypotensive.   Per nursing patient desatted overnight to 86%, was placed on 6 L nasal cannula with subsequent improvement in oxygenation. Chest x-ray showing beginnings of pulmonary edema. We will trial BiPAP support to help blow this fluid off. We will decrease patient's IV fluids to rate of 100 mL an hour from 150 mL an hour. We can trial intermittent periods on nasal cannula off BiPAP so patient may eat. She is able to self rescue herself and understands when to. - Update 1230: Received a call from patient's nurse informing me that patient is satting 85% on 6 L nasal cannula while eating her lunch off of the BiPAP. Informed nursing that she needs to be on high flow nasal cannula when she is off BiPAP support given such drastic hypoxic desaturations. RN knows to put patient back on BiPAP support after she eats. Will obtain ABG this afternoon to evaluate her response. We will adjust patient's IV fluids accordingly pending progression of respiratory status, vital signs. Continue IV antibiotics at this time. 1/29/23: Afebrile, hemodynamically stable, satting 97% on 4 L nasal cannula. No acute events overnight. Patient has been successfully weaned from BiPAP support. Her lung sounds are clear and sound much better. Patient no longer having fevers today. Day 1 without a fever. Blood pressures appear to be stabilizing, heart rate also appears to be stabilizing. White count continues to downtrend. We will continue IV antibiotics at this time. Initial urine culture showing growth of contaminants. We repeat urine culture. Thus far all cultures have been negative however patient was overtly septic on admission. Patient stating that she is having some right lower quadrant abdominal pain rated 5/10.    1/30/23: Well, hemodynamically stable. Patient satting 95% on 5 L nasal cannula. Instructed nursing staff to wean oxygen. Per nursing staff every time they try to attempt to wean oxygen patient dropped into the upper 80s. Will obtain repeat chest x-ray for further evaluation of hypoxic desaturations. Encouraged patient to get up and move and get out of bed. Encouraged her to ambulate in the hallways and use her incentive spirometer and Acapella device. This is day 2 without fevers. She was reporting improvement in her right lower quadrant abdominal pain rated 4/10 today. 1/31/23: Afebrile, satting 96% on 4L/NC. Patient was transferred out of stepdown yesterday evening. Via chart review appears nursing staff has attempted to wean oxygen from 5 L nasal cannula and patient did have hypoxic desaturation, will slowly wean back to 4 L as we see this morning. Repeat chest x-ray showing mild cardiomegaly, no effusion, mild atelectasis/pneumonia both mid and lower lung fields worse on the right. When compared with previous chest x-ray dated 1/28 overall appearance of chest has improved however still looks like she may have some mild pulmonary vascular congestion. We will give her a one-time dose IV Lasix to see if this helps improve her oxygenation and wean. Nursing staff notified to get patient up and ambulate her and to start having her use her incentive spirometer and Acapella device. Home oxygen evaluation ordered. Hopeful discharge later this evening or tomorrow      Subjective (past 24 hours):      Denies chest pains, shortness of breath at rest, palpitations, dizziness, lightheadedness, RUSSO, nausea, vomiting, diarrhea, fever, chills.     Medications:  Reviewed    Infusion Medications    sodium chloride       Scheduled Medications    calcium replacement protocol   Other RX Placeholder    piperacillin-tazobactam  3,375 mg IntraVENous Q8H    lactobacillus  1 capsule Oral TID     sodium chloride flush  10 mL IntraVENous 2 times per day    enoxaparin  40 mg SubCUTAneous Q24H     PRN Meds: HYDROcodone 5 mg - acetaminophen, acetaminophen **OR** acetaminophen, sodium phosphate IVPB **OR** sodium phosphate IVPB **OR** sodium phosphate IVPB, trimethobenzamide, loperamide, magnesium sulfate, potassium chloride **OR** potassium alternative oral replacement **OR** potassium chloride, sodium chloride flush, sodium chloride, ondansetron **OR** ondansetron, polyethylene glycol      Intake/Output Summary (Last 24 hours) at 1/31/2023 2122  Last data filed at 1/31/2023 2103  Gross per 24 hour   Intake 1704 ml   Output 3297 ml   Net -1593 ml       Diet:  ADULT DIET; Regular; GI [place] (GERD/Peptic Ulcer)    Exam:  /80   Pulse 77   Temp 99.9 °F (37.7 °C) (Oral)   Resp 22   Ht 5' 2\" (1.575 m)   Wt 196 lb 3.4 oz (89 kg)   LMP 01/12/2023 (Exact Date)   SpO2 93%   BMI 35.89 kg/m²     General appearance: No apparent distress, appears stated age and cooperative. HEENT: Pupils equal, round, and reactive to light. Conjunctivae/corneas clear. Neck: Supple, with full range of motion. No jugular venous distention. Trachea midline. Respiratory:  Normal respiratory effort. Able to speak full clear sentences. Clear to auscultation in the apices, diminished in the bases bilaterally, without Rales/Wheezes/Rhonchi. Cardiovascular: Regular rate and rhythm with normal S1/S2 without murmurs, rubs or gallops. Abdomen: Soft, non-distended with normal bowel sounds. Mild RLQ tenderness upon palpation  Musculoskeletal: passive and active ROM x 4 extremities. Skin: Skin color, texture, turgor normal.  No rashes or lesions. Skin diaphoretic, warm  Neurologic:  Neurovascularly intact without any focal sensory/motor deficits.  Cranial nerves: II-XII intact, grossly non-focal.  Psychiatric: Alert and oriented, thought content appropriate, normal insight  Capillary Refill: Brisk,< 3 seconds   Peripheral Pulses: +2 palpable, equal bilaterally       Labs:   Recent Labs     01/29/23  0618 01/30/23  0356 01/31/23  0531   WBC 10.7 7.2 6.5   HGB 11.6* 12.3 12.6   HCT 36.8* 37.6 39.2    258 323     Recent Labs     01/29/23  0618 01/30/23  0356 01/31/23  0531    141 140   K 3.8 3.3* 3.5    104 104   CO2 24 25 29   BUN 5* 4* 4* CREATININE 0.7 0.8 0.8   CALCIUM 7.9* 8.0* 8.6   PHOS 2.6 2.3* 2.4     Recent Labs     01/29/23  0618 01/30/23  0356 01/31/23  0531   AST 10 15 53*   ALT 18 17 44   BILITOT 0.8 0.7 0.5   ALKPHOS 76 102 117     No results for input(s): INR in the last 72 hours. No results for input(s): Marylou Earnest in the last 72 hours. Microbiology:      Urinalysis:      Lab Results   Component Value Date/Time    NITRU NEGATIVE 01/26/2023 08:55 AM    WBCUA > 200 01/26/2023 08:55 AM    BACTERIA FEW 01/26/2023 08:55 AM    RBCUA 5-10 01/26/2023 08:55 AM    BLOODU MODERATE 01/26/2023 08:55 AM    SPECGRAV 1.009 01/26/2023 08:55 AM    GLUCOSEU NEGATIVE 07/16/2017 05:00 AM       Radiology:  XR CHEST (2 VW)    Result Date: 1/26/2023  PROCEDURE: XR CHEST (2 VW) CLINICAL INFORMATION: Tachycardia, shortness of breath TECHNIQUE: PA and lateral chest radiographs. COMPARISON: [facility] chest radiograph 2/4/2014 FINDINGS: Cardiomediastinal silhouette is within normal limits. Lung volumes are slightly low. There are indistinct alveolar and reticular opacities at the left lung base. Soft tissues and osseous structures are unremarkable. Left lower lung atelectasis/infiltrate. **This report has been created using voice recognition software. It may contain minor errors which are inherent in voice recognition technology. ** Final report electronically signed by Dr. Deisy Easley on 1/26/2023 6:21 PM    CTA CHEST W WO CONTRAST    Result Date: 1/27/2023  Exam: CTA Chest with IV contrast. Procedure: Coronal and sagittal MIP reformats were performed Comparison: Chest x-ray 1/26/2023 Clinical history: Elevated d-dimer with tachycardia. Nephrolithiasis and septicemia Findings: Evaluation is limited by motion artifact No pulmonary emboli. No thoracic aortic aneurysm or dissection. No hilar or mediastinal adenopathy. No pericardial fluid collection. No pleural fluid collections.  No pneumothorax Bilateral perihilar groundglass opacities may represent pulmonary edema or pneumonia. No focal area of consolidation Left-sided  shunt Visualized liver and spleen do not demonstrate any acute process Prior cholecystectomy No thoracic compression fractures     Impression: Evaluation of the by motion 1. No pulmonary emboli. 2. No thoracic aortic aneurysm or dissection 3. Bilateral perihilar groundglass opacities may represent pulmonary edema or atypical pneumonia. No focal area of consolidation This document has been electronically signed by: Hermelindo Herr MD on 01/27/2023 02:19 AM All CTs at this facility use dose modulation techniques and iterative reconstructions, and/or weight-based dosing when appropriate to reduce radiation to a low as reasonably achievable. 3D Post-processing was performed on this study. CT ABDOMEN PELVIS W IV CONTRAST Additional Contrast? None    Result Date: 1/26/2023  PROCEDURE: CT ABDOMEN PELVIS W IV CONTRAST CLINICAL INFORMATION: Right-sided abdominal pain. COMPARISON: CT abdomen and pelvis 7/16/2017. Gallbladder ultrasound 7/16/2017. TECHNIQUE: Axial 5 mm CT images were obtained through the abdomen and pelvis after the administration of 80  cc Isovue 370 intravenous contrast. Coronal and sagittal reconstructions were obtained. All CT scans at this facility use dose modulation, iterative reconstruction, and/or weight-based dosing when appropriate to reduce radiation dose to as low as reasonably achievable. FINDINGS: Lung bases: Dependent atelectasis is present. Liver/gallbladder/bilary tree: The gallbladder is surgically absent. No biliary ductal dilatation is observed. Hepatic steatosis appears unchanged. Pancreas: The evaluation is limited secondary to patient motion. Spleen : Unremarkable accounting for patient motion. Adrenal glands: Unremarkable accounting for patient motion.  Kidneys/ ureters/ bladder: A 3 mm obstructing calculus at the right ureterovesicular junction (series 2, image 78) resultant mild right hydronephrosis and right hydroureter. The right renal pelvis and proximal right ureter are enhancing with moderate perinephric fat stranding and inflammation observed. Patchy diminished enhancement of the right renal parenchyma is visualized. The left kidney is unremarkable. The urinary bladder is partially distended and grossly unremarkable. Gastrointestinal:  A  shunt in the right upper quadrant appears intact. No bowel obstruction, free fluid, fluid collection, or free air is observed. The appendix contains fluid however no periappendiceal fat stranding or inflammation is observed. A moderate sliding-type hiatal hernia appears stable. Retroperitoneum / lymph nodes: The aorta is not dilated. No lymphadenopathy is visualized. Pelvis: No adnexal lesions are observed. A 10 mm follicle is seen within the right ovary. Musculoskeletal: The visualized skeletal structures appear intact. 1. A 3 mm obstructing calculus at the right ureterovesicular junction results in mild right hydronephrosis and hydroureter with moderate right perinephric stranding and inflammation. Diminished enhancement of the right renal parenchyma as well as enhancement of the right renal pelvis and proximal right ureter suggest pyelonephritis. Correlate with urinalysis. 2. The appendix measures 7 mm in diameter and contains fluid however there is no periappendiceal fat stranding or inflammation to suggest acute appendicitis. 3. Limited examination secondary to patient motion. Chronic findings are discussed. **This report has been created using voice recognition software. It may contain minor errors which are inherent in voice recognition technology. ** Final report electronically signed by Dr Sofie Sweeney on 1/26/2023 9:50 AM    FLUORO FOR SURGICAL PROCEDURES    Result Date: 1/26/2023  Radiology exam is complete. No Radiologist dictation. Please follow up with ordering provider.        DVT prophylaxis: [x] Lovenox                                 [] SCDs [] SQ Heparin                                 [] Encourage ambulation           [] Already on Anticoagulation     Code Status: Full Code    PT/OT Eval Status: None    Tele:   [x] yes             [] No    Normal sinus rhythm      Active Hospital Problems    Diagnosis Date Noted    [facility] [A41.9] 01/27/2023     Priority: Medium    Pyelonephritis [N12] 01/27/2023     Priority: Medium    Tachycardia [R00.0] 01/27/2023     Priority: Medium    Hypotension due to hypovolemia [I95.89, E86.1] 01/27/2023     Priority: Medium    Diarrhea [R19.7] 01/27/2023     Priority: Medium    Hypomagnesemia [E83.42] 01/27/2023     Priority: Medium    Hyponatremia [E87.1] 01/27/2023     Priority: Medium    Nephrolithiasis [N20.0] 01/26/2023     Priority: Medium    Obstruction of right ureter [N13.5] 01/26/2023     Priority: Medium       Electronically signed by DARI Santana CNP on 1/31/2023 at 9:22 PM

## 2023-02-01 NOTE — PLAN OF CARE
Problem: Discharge Planning  Goal: Discharge to home or other facility with appropriate resources  2/1/2023 1127 by Gibran Burden RN  Outcome: Completed  1/31/2023 2158 by Marisela Montero RN  Outcome: Progressing  Flowsheets (Taken 1/31/2023 2116)  Discharge to home or other facility with appropriate resources: Identify barriers to discharge with patient and caregiver     Problem: Safety - Adult  Goal: Free from fall injury  2/1/2023 1127 by Gibran Burden RN  Outcome: Completed  1/31/2023 2158 by Marisela Montero RN  Outcome: Progressing     Problem: Respiratory - Adult  Goal: Achieves optimal ventilation and oxygenation  2/1/2023 1127 by Gibran Burden RN  Outcome: Completed  1/31/2023 2158 by Marisela Montero RN  Outcome: Progressing  Flowsheets (Taken 1/31/2023 2116)  Achieves optimal ventilation and oxygenation: Assess for changes in respiratory status     Problem: Cardiovascular - Adult  Goal: Maintains optimal cardiac output and hemodynamic stability  2/1/2023 1127 by Gibran Burden RN  Outcome: Completed  1/31/2023 2158 by Marisela Montero RN  Outcome: Progressing  Goal: Absence of cardiac dysrhythmias or at baseline  2/1/2023 1127 by Gibran Burden RN  Outcome: Completed  1/31/2023 2158 by Marisela Montero RN  Outcome: Progressing     Problem: Infection - Adult  Goal: Absence of infection at discharge  2/1/2023 1127 by Gibran Burden RN  Outcome: Completed  1/31/2023 2158 by Marisela Montero RN  Outcome: Progressing  [address] (Taken 1/31/2023 2116)  Absence of infection at discharge: Assess and monitor for signs and symptoms of infection  Goal: Absence of infection during hospitalization  2/1/2023 1127 by Gibran Burden RN  Outcome: Completed  1/31/2023 2158 by Marisela Montero RN  Outcome: Progressing  Flowsheets (Taken 1/31/2023 2116)  Absence of infection during hospitalization: Assess and monitor for signs and symptoms of infection  Goal: Absence of fever/infection during anticipated neutropenic period  2/1/2023 1127 by Debbie Feliciano RN  Outcome: Completed  1/31/2023 2158 by Trinh Peterson RN  Outcome: Progressing  Flowsheets (Taken 1/31/2023 2116)  Absence of fever/infection during anticipated neutropenic period: Monitor white blood cell count     Problem: Pain  Goal: Verbalizes/displays adequate comfort level or baseline comfort level  2/1/2023 1127 by Debbie Feliciano RN  Outcome: Completed  1/31/2023 2158 by Trinh Peterson RN  Outcome: Progressing     Problem: Skin/Tissue Integrity  Goal: Absence of new skin breakdown  Description: 1. Monitor for areas of redness and/or skin breakdown  2. Assess vascular access sites hourly  3. Every 4-6 hours minimum:  Change oxygen saturation probe site  4. Every 4-6 hours:  If on nasal continuous positive airway pressure, respiratory therapy assess nares and determine need for appliance change or resting period.   2/1/2023 1127 by Debbie Feliciano RN  Outcome: Completed  1/31/2023 2158 by Trinh Peterson RN  Outcome: Progressing

## 2023-02-01 NOTE — PLAN OF CARE
Problem: Discharge Planning  Goal: Discharge to home or other facility with appropriate resources  2/1/2023 1127 by Clem Pal RN  Outcome: Completed     Problem: Safety - Adult  Goal: Free from fall injury  2/1/2023 1127 by Clem Pal RN  Outcome: Completed     Problem: Respiratory - Adult  Goal: Achieves optimal ventilation and oxygenation  2/1/2023 1127 by Clem Pal RN  Outcome: Completed     Problem: Cardiovascular - Adult  Goal: Maintains optimal cardiac output and hemodynamic stability  2/1/2023 1127 by Clem Pal RN  Outcome: Completed  Goal: Absence of cardiac dysrhythmias or at baseline  2/1/2023 1127 by Clem Pal RN  Outcome: Completed     Problem: Infection - Adult  Goal: Absence of infection at discharge  2/1/2023 1127 by Clem Pal RN  Outcome: Completed  Goal: Absence of infection during hospitalization  2/1/2023 1127 by Clem Pal RN  Outcome: Completed  Goal: Absence of fever/infection during anticipated neutropenic period  2/1/2023 1127 by Clem Pal RN  Outcome: Completed     Problem: Pain  Goal: Verbalizes/displays adequate comfort level or baseline comfort level  2/1/2023 1127 by Clem Pal RN  Outcome: Completed     Problem: Skin/Tissue Integrity  Goal: Absence of new skin breakdown  Description: 1. Monitor for areas of redness and/or skin breakdown  2. Assess vascular access sites hourly  3. Every 4-6 hours minimum:  Change oxygen saturation probe site  4. Every 4-6 hours:  If on nasal continuous positive airway pressure, respiratory therapy assess nares and determine need for appliance change or resting period.   2/1/2023 1127 by Clem Pal RN  Outcome: Completed

## 2023-02-01 NOTE — PROGRESS NOTES
[address] [facility]  OCCUPATIONAL THERAPY MISSED TREATMENT NOTE  [facility] RENAL TELEMETRY 6K  6K-11/011-A      Date: 2023  Patient Name: Keyana Hidalgo        CSN: [id number]   : 2000  (23 y.o.)  Gender: female   Referring Practitioner: DARI Sheridan CNP  Diagnosis: Nephrolithiasis         REASON FOR MISSED TREATMENT:  Pt declined therapy at time of arrival stating she had not started her breakfast yet. Will re attempt as able.

## 2023-02-01 NOTE — PROGRESS NOTES
Patient alert and oriented; vital signs stable. Discharge instructions reviewed with patient. Patient verbalizes understanding. Telemetry and IV removed. All belongings with patient, wheelchair transportation provided. Meds to bed received. Patient instructed to follow up with urology in 2 weeks with Marylen Sever, CNP.  Address and phone number highlighted on d/c instructions

## 2023-02-01 NOTE — PLAN OF CARE
Problem: Discharge Planning  Goal: Discharge to home or other facility with appropriate resources  Outcome: Progressing  Flowsheets (Taken 1/31/2023 0854)  Discharge to home or other facility with appropriate resources:   Arrange for needed discharge resources and transportation as appropriate   Identify barriers to discharge with patient and caregiver     Problem: Safety - Adult  Goal: Free from fall injury  Outcome: Progressing  Flowsheets (Taken 1/31/2023 1052)  Free From Fall Injury: Instruct family/caregiver on patient safety     Problem: Cardiovascular - Adult  Goal: Absence of cardiac dysrhythmias or at baseline  Outcome: Progressing  Flowsheets (Taken 1/31/2023 0854)  Absence of cardiac dysrhythmias or at baseline:   Assess for signs of decreased cardiac output   Monitor cardiac rate and rhythm

## 2023-02-06 ENCOUNTER — PREP FOR PROCEDURE (OUTPATIENT)
Dept: UROLOGY | Age: 23
End: 2023-02-06

## 2023-02-06 ENCOUNTER — TELEPHONE (OUTPATIENT)
Dept: UROLOGY | Age: 23
End: 2023-02-06

## 2023-02-06 NOTE — TELEPHONE ENCOUNTER
Per Patria Almaraz at [facility], [facility] no prior authorization is required for CPT codes 37243 and 19111 as long as provider is in network. Call ref# [id number].

## 2023-02-16 ENCOUNTER — OFFICE VISIT (OUTPATIENT)
Dept: UROLOGY | Age: 23
End: 2023-02-16
Payer: MEDICARE

## 2023-02-16 VITALS — WEIGHT: 195 LBS | BODY MASS INDEX: 35.88 KG/M2 | RESPIRATION RATE: 16 BRPM | HEIGHT: 62 IN

## 2023-02-16 DIAGNOSIS — R10.9 FLANK PAIN: ICD-10-CM

## 2023-02-16 DIAGNOSIS — N20.0 NEPHROLITHIASIS: Primary | ICD-10-CM

## 2023-02-16 LAB
BILIRUBIN URINE: ABNORMAL
BLOOD URINE, POC: ABNORMAL
CHARACTER, URINE: CLEAR
COLOR, URINE: YELLOW
GLUCOSE URINE: NEGATIVE MG/DL
KETONES, URINE: ABNORMAL
LEUKOCYTE CLUMPS, URINE: ABNORMAL
NITRITE, URINE: NEGATIVE
PH, URINE: 5.5 (ref 5–9)
PROTEIN, URINE: >= 300 MG/DL
SPECIFIC GRAVITY, URINE: 1.02 (ref 1–1.03)
UROBILINOGEN, URINE: 0.2 EU/DL (ref 0–1)

## 2023-02-16 PROCEDURE — 81003 URINALYSIS AUTO W/O SCOPE: CPT | Performed by: UROLOGY

## 2023-02-16 PROCEDURE — G8417 CALC BMI ABV UP PARAM F/U: HCPCS | Performed by: UROLOGY

## 2023-02-16 PROCEDURE — G8427 DOCREV CUR MEDS BY ELIG CLIN: HCPCS | Performed by: UROLOGY

## 2023-02-16 PROCEDURE — 1036F TOBACCO NON-USER: CPT | Performed by: UROLOGY

## 2023-02-16 PROCEDURE — 1111F DSCHRG MED/CURRENT MED MERGE: CPT | Performed by: UROLOGY

## 2023-02-16 PROCEDURE — 99204 OFFICE O/P NEW MOD 45 MIN: CPT | Performed by: UROLOGY

## 2023-02-16 PROCEDURE — G8484 FLU IMMUNIZE NO ADMIN: HCPCS | Performed by: UROLOGY

## 2023-02-16 RX ORDER — HYDROCODONE BITARTRATE AND ACETAMINOPHEN 5; 325 MG/1; MG/1
1 TABLET ORAL EVERY 6 HOURS PRN
Qty: 20 TABLET | Refills: 0 | Status: SHIPPED | OUTPATIENT
Start: 2023-02-16 | End: 2023-02-23

## 2023-02-16 NOTE — PROGRESS NOTES
Dayne Ndiaye MD  Urology Clinic office visit  NEW PATIENT    Patient:  Jazmin You  YOB: 2000  Date: 2/16/2023    HISTORY OF PRESENT ILLNESS:   The patient is a 23 y.o. female who presents today for evaluation of the following problems:      1. Nephrolithiasis    2. Flank pain         Overall the problem(s) : are improving. Associated Symptoms: No dysuria, gross hematuria. Pain Severity:      Summary of old records: N/A  (Patient's old records, notes and chart reviewed and summarized above.)      Onset 1/26/2023  Severity is described as right ureteral stone 3 mm, had stent placed. The course of symptoms over time is acute. Alleviating factors: stent  Worsening factors: none    Urinalysis today:  Results for POC orders placed in visit on 02/16/23   POCT Urinalysis No Micro (Auto)   Result Value Ref Range    Glucose, Ur Negative NEGATIVE mg/dl    Bilirubin Urine Small (A)     Ketones, Urine Trace (A) NEGATIVE    Specific Gravity, Urine 1.025 1.002 - 1.030    Blood, UA POC Large (A) NEGATIVE    pH, Urine 5.50 5.0 - 9.0    Protein, Urine >= 300 (A) NEGATIVE mg/dl    Urobilinogen, Urine 0.20 0.0 - 1.0 eu/dl    Nitrite, Urine Negative NEGATIVE    Leukocyte Clumps, Urine Small (A) NEGATIVE    Color, Urine Yellow YELLOW-STRAW    Character, Urine Clear CLR-SL.CLOUD       Last BUN and creatinine:  Lab Results   Component Value Date    BUN 7 02/01/2023     Lab Results   Component Value Date    CREATININE 0.8 02/01/2023       Imaging Reviewed during this Office Visit:   (results were independently reviewed by physician and radiology report verified)  I independently reviewed and verified the images and reports from:    XR CHEST (2 VW)    Result Date: 1/26/2023  PROCEDURE: XR CHEST (2 VW) CLINICAL INFORMATION: Tachycardia, shortness of breath TECHNIQUE: PA and lateral chest radiographs. COMPARISON: AP chest radiograph 2/4/2014 FINDINGS: Cardiomediastinal silhouette is within normal limits.  Lung volumes are slightly low. There are indistinct alveolar and reticular opacities at the left lung base. Soft tissues and osseous structures are unremarkable. Left lower lung atelectasis/infiltrate. **This report has been created using voice recognition software. It may contain minor errors which are inherent in voice recognition technology. ** Final report electronically signed by Dr. Lisa Zee on 1/26/2023 6:21 PM    CTA CHEST W WO CONTRAST    Result Date: 1/27/2023  Exam: CTA Chest with IV contrast. Procedure: Coronal and sagittal MIP reformats were performed Comparison: Chest x-ray 1/26/2023 Clinical history: Elevated d-dimer with tachycardia. Nephrolithiasis and septicemia Findings: Evaluation is limited by motion artifact No pulmonary emboli. No thoracic aortic aneurysm or dissection. No hilar or mediastinal adenopathy. No pericardial fluid collection. No pleural fluid collections. No pneumothorax Bilateral perihilar groundglass opacities may represent pulmonary edema or pneumonia. No focal area of consolidation Left-sided  shunt Visualized liver and spleen do not demonstrate any acute process Prior cholecystectomy No thoracic compression fractures     Impression: Evaluation of the by motion 1. No pulmonary emboli. 2. No thoracic aortic aneurysm or dissection 3. Bilateral perihilar groundglass opacities may represent pulmonary edema or atypical pneumonia. No focal area of consolidation This document has been electronically signed by: Sirisha Polanco MD on 01/27/2023 02:19 AM All CTs at this facility use dose modulation techniques and iterative reconstructions, and/or weight-based dosing when appropriate to reduce radiation to a low as reasonably achievable. 3D Post-processing was performed on this study. CT ABDOMEN PELVIS W IV CONTRAST Additional Contrast? None    Result Date: 1/27/2023  PROCEDURE: CT ABDOMEN PELVIS W IV CONTRAST CLINICAL INFORMATION: Recheck appendix.  Right lower quadrant abdominal pain. COMPARISON: CT abdomen and pelvis 1/26/2023. TECHNIQUE: Axial 5 mm CT images were obtained through the abdomen and pelvis after the administration of 80  cc Isovue 370 intravenous contrast. Coronal and sagittal reconstructions were obtained. All CT scans at this facility use dose modulation, iterative reconstruction, and/or weight-based dosing when appropriate to reduce radiation dose to as low as reasonably achievable. FINDINGS: Lung bases: Small bilateral pleural effusions and bilateral lower lobe airspace opacities are present. Liver/gallbladder/bilary tree: The gallbladder is surgically absent. No biliary ductal dilatation is observed. Hepatic steatosis is unchanged. No liver lesions are identified. Pancreas: Normal. Spleen : Normal. Adrenal glands: Normal. Kidneys/ ureters/ bladder: A right ureteral stent is present. Right-sided hydronephrosis and hydroureter has improved. There continues to be patchy diminished enhancement in the right renal parenchyma. The left kidney is unremarkable. The urinary bladder  is opacified with contrast. Gastrointestinal:  The appendix is normal. No bowel obstruction, free fluid, fluid collection, or free air is identified. A  shunt in the right upper quadrant is unchanged. A sliding-type hiatal hernia is stable. Retroperitoneum / lymph nodes: The aorta is not dilated. No lymphadenopathy is observed. Pelvis: No adnexal lesions are observed. A 10 mm right ovarian follicle is stable. Musculoskeletal: The visualized skeletal structures appear intact. 1. Small bilateral pleural effusions and patchy bilateral lower lobe airspace opacities can be clinically correlated for pneumonia. 2. Patchy diminished enhancement in the right renal parenchyma suggesting pyelonephritis is not significantly changed. A right ureteral stent is present. Mild right-sided hydronephrosis and hydroureter has improved.  3. The appendix is normal. **This report has been created using voice recognition software. It may contain minor errors which are inherent in voice recognition technology. ** Final report electronically signed by Dr Mikki Gomez on 1/27/2023 11:19 AM    CT ABDOMEN PELVIS W IV CONTRAST Additional Contrast? None    Result Date: 1/26/2023  PROCEDURE: CT ABDOMEN PELVIS W IV CONTRAST CLINICAL INFORMATION: Right-sided abdominal pain. COMPARISON: CT abdomen and pelvis 7/16/2017. Gallbladder ultrasound 7/16/2017. TECHNIQUE: Axial 5 mm CT images were obtained through the abdomen and pelvis after the administration of 80  cc Isovue 370 intravenous contrast. Coronal and sagittal reconstructions were obtained. All CT scans at this facility use dose modulation, iterative reconstruction, and/or weight-based dosing when appropriate to reduce radiation dose to as low as reasonably achievable. FINDINGS: Lung bases: Dependent atelectasis is present. Liver/gallbladder/bilary tree: The gallbladder is surgically absent. No biliary ductal dilatation is observed. Hepatic steatosis appears unchanged. Pancreas: The evaluation is limited secondary to patient motion. Spleen : Unremarkable accounting for patient motion. Adrenal glands: Unremarkable accounting for patient motion. Kidneys/ ureters/ bladder: A 3 mm obstructing calculus at the right ureterovesicular junction (series 2, image 78) resultant mild right hydronephrosis and right hydroureter. The right renal pelvis and proximal right ureter are enhancing with moderate perinephric fat stranding and inflammation observed. Patchy diminished enhancement of the right renal parenchyma is visualized. The left kidney is unremarkable. The urinary bladder is partially distended and grossly unremarkable. Gastrointestinal:  A  shunt in the right upper quadrant appears intact. No bowel obstruction, free fluid, fluid collection, or free air is observed. The appendix contains fluid however no periappendiceal fat stranding or inflammation is observed.  A moderate sliding-type hiatal hernia appears stable. Retroperitoneum / lymph nodes: The aorta is not dilated. No lymphadenopathy is visualized. Pelvis: No adnexal lesions are observed. A 10 mm follicle is seen within the right ovary. Musculoskeletal: The visualized skeletal structures appear intact. 1. A 3 mm obstructing calculus at the right ureterovesicular junction results in mild right hydronephrosis and hydroureter with moderate right perinephric stranding and inflammation. Diminished enhancement of the right renal parenchyma as well as enhancement of the right renal pelvis and proximal right ureter suggest pyelonephritis. Correlate with urinalysis. 2. The appendix measures 7 mm in diameter and contains fluid however there is no periappendiceal fat stranding or inflammation to suggest acute appendicitis. 3. Limited examination secondary to patient motion. Chronic findings are discussed. **This report has been created using voice recognition software. It may contain minor errors which are inherent in voice recognition technology. ** Final report electronically signed by Dr Татьяна Jefferson on 1/26/2023 9:50 AM    FLUORO FOR SURGICAL PROCEDURES    Result Date: 1/26/2023  Radiology exam is complete. No Radiologist dictation. Please follow up with ordering provider. PAST MEDICAL, FAMILY AND SOCIAL HISTORY:  Past Medical History:   Diagnosis Date    ADHD (attention deficit hyperactivity disorder)     Premature baby     27 weeks     Past Surgical History:   Procedure Laterality Date    ACHILLES TENDON SURGERY      CHOLECYSTECTOMY, LAPAROSCOPIC N/A 7/16/2017    LAPAROSCOPY; OPEN CHOLECYSTECTOMY performed by Scarlett Galicia MD at [address] [facility]      revision  shunt 1999    URETER SURGERY Right 1/26/2023    Cystoscopy Right Stent Insertion performed by Porter Turner MD at [address] [facility]       No family history on file.   Outpatient Medications Marked as Taking for the 2/16/23 encounter (Office Visit) with Madyson Del Real MD   Medication Sig Dispense Refill    HYDROcodone-acetaminophen (NORCO) 5-325 MG per tablet Take 1 tablet by mouth every 6 hours as needed for Pain for up to 7 days. Intended supply: 7 days. Take lowest dose possible to manage pain Max Daily Amount: 4 tablets 20 tablet 0       Patient has no known allergies. Social History     Tobacco Use   Smoking Status Never   Smokeless Tobacco Not on file       Social History     Substance and Sexual Activity   Alcohol Use No       REVIEW OF SYSTEMS:  Constitutional: negative  Eyes: negative  Respiratory: negative  Cardiovascular: negative  Gastrointestinal: negative  Genitourinary: negative except for what is in HPI  Musculoskeletal: negative  Skin: negative   Neurological: negative  Hematological/Lymphatic: negative  Psychological: negative    Physical Exam:    This a 23 y.o. female      Vitals:    02/16/23 1444   Resp: 16     Constitutional: Patient in no acute distress. Neuro: Alert and oriented to person, place and time. Psych: mood and affect normal  HEENT negative  Lungs: Respiratory effort is normal  Cardiovascular: Normal peripheral pulses  Abdomen: Soft, non-tender, non-distended   Lymphatics: No palpable lymphadenopathy. Bladder non-tender and not distended. Assessment and Plan      1. Nephrolithiasis    2. Flank pain           Plan:        1/26/2023  Cystoscopy Right Stent Insertion  1/27/2023 CT A right ureteral stent is present. Right-sided hydronephrosis and hydroureter has improved. Scheduled for 2/28/2023 follow up for definitive cystoscopy right ureteroscopy, possible HLL, possible stent removal versus exchange             Prescriptions Ordered:  Orders Placed This Encounter   Medications    HYDROcodone-acetaminophen (NORCO) 5-325 MG per tablet     Sig: Take 1 tablet by mouth every 6 hours as needed for Pain for up to 7 days. Intended supply: 7 days.  Take lowest dose possible to manage pain Max Daily Amount: 4 tablets     Dispense:  20 tablet     Refill:  0     Reduce doses taken as pain becomes manageable        Orders Placed:  Orders Placed This Encounter   Procedures    POCT Urinalysis No Micro (Auto)            MD Hossein Brown M.D, MD  [facility] Urology

## 2023-02-18 LAB
BACTERIA UR CULT: ABNORMAL
ORGANISM: ABNORMAL

## 2023-02-21 ENCOUNTER — TELEPHONE (OUTPATIENT)
Dept: UROLOGY | Age: 23
End: 2023-02-21

## 2023-02-21 NOTE — TELEPHONE ENCOUNTER
Please review urine culture on 2/16/23. Surgery with Dr Amber Pate on 2/28/23 for a Cystoscopy, Right ureteroscopy, possible Holmium Laser Lithotripsy, possible right stent removal Thanks.

## 2023-02-26 ENCOUNTER — APPOINTMENT (OUTPATIENT)
Dept: ULTRASOUND IMAGING | Age: 23
DRG: 720 | End: 2023-02-26
Payer: MEDICAID

## 2023-02-26 ENCOUNTER — APPOINTMENT (OUTPATIENT)
Dept: CT IMAGING | Age: 23
DRG: 720 | End: 2023-02-26
Payer: MEDICAID

## 2023-02-26 ENCOUNTER — HOSPITAL ENCOUNTER (INPATIENT)
Age: 23
LOS: 3 days | Discharge: HOME | DRG: 720 | End: 2023-03-01
Attending: EMERGENCY MEDICINE | Admitting: INTERNAL MEDICINE
Payer: MEDICAID

## 2023-02-26 DIAGNOSIS — N10 ACUTE PYELONEPHRITIS: ICD-10-CM

## 2023-02-26 DIAGNOSIS — N17.9 ACUTE KIDNEY INJURY (HCC): ICD-10-CM

## 2023-02-26 DIAGNOSIS — A41.9 SEPSIS, DUE TO UNSPECIFIED ORGANISM, UNSPECIFIED WHETHER ACUTE ORGAN DYSFUNCTION PRESENT (HCC): Primary | ICD-10-CM

## 2023-02-26 PROBLEM — N39.0 COMPLICATED UTI (URINARY TRACT INFECTION): Status: ACTIVE | Noted: 2023-02-26

## 2023-02-26 LAB
ALBUMIN SERPL BCG-MCNC: 3.7 G/DL (ref 3.5–5.1)
ALP SERPL-CCNC: 129 U/L (ref 38–126)
ALT SERPL W/O P-5'-P-CCNC: 44 U/L (ref 11–66)
AMORPH SED URNS QL MICRO: ABNORMAL
ANION GAP SERPL CALC-SCNC: 17 MEQ/L (ref 8–16)
AST SERPL-CCNC: 35 U/L (ref 5–40)
AUTO DIFF PNL BLD: ABNORMAL
B-HCG SERPL QL: NEGATIVE
BACTERIA URNS QL MICRO: ABNORMAL /HPF
BASOPHILS ABSOLUTE: 0.1 THOU/MM3 (ref 0–0.1)
BASOPHILS NFR BLD AUTO: 0.4 %
BILIRUB CONJ SERPL-MCNC: 1 MG/DL (ref 0–0.3)
BILIRUB SERPL-MCNC: 1.6 MG/DL (ref 0.3–1.2)
BILIRUB UR QL STRIP.AUTO: ABNORMAL
BUN SERPL-MCNC: 19 MG/DL (ref 7–22)
CALCIUM SERPL-MCNC: 9.1 MG/DL (ref 8.5–10.5)
CASTS #/AREA URNS LPF: ABNORMAL /LPF
CASTS 2: ABNORMAL /LPF
CHARACTER UR: ABNORMAL
CHLORIDE SERPL-SCNC: 94 MEQ/L (ref 98–111)
CO2 SERPL-SCNC: 21 MEQ/L (ref 23–33)
COLOR: ABNORMAL
CREAT SERPL-MCNC: 1.5 MG/DL (ref 0.4–1.2)
CRYSTALS URNS MICRO: ABNORMAL
D DIMER PPP IA.FEU-MCNC: 1781 NG/ML FEU (ref 0–500)
DEPRECATED RDW RBC AUTO: 47.3 FL (ref 35–45)
EOSINOPHIL NFR BLD AUTO: 0 %
EOSINOPHILS ABSOLUTE: 0 THOU/MM3 (ref 0–0.4)
EPITHELIAL CELLS, UA: ABNORMAL /HPF
ERYTHROCYTE [DISTWIDTH] IN BLOOD BY AUTOMATED COUNT: 14.6 % (ref 11.5–14.5)
FLUAV RNA RESP QL NAA+PROBE: NOT DETECTED
FLUBV RNA RESP QL NAA+PROBE: NOT DETECTED
GFR SERPL CREATININE-BSD FRML MDRD: 49 ML/MIN/1.73M2
GLUCOSE SERPL-MCNC: 167 MG/DL (ref 70–108)
GLUCOSE UR QL STRIP.AUTO: NEGATIVE MG/DL
HCT VFR BLD AUTO: 41 % (ref 37–47)
HGB BLD-MCNC: 13.7 GM/DL (ref 12–16)
HGB UR QL STRIP.AUTO: ABNORMAL
ICTOTEST: POSITIVE
IMM GRANULOCYTES # BLD AUTO: 0.8 THOU/MM3 (ref 0–0.07)
IMM GRANULOCYTES NFR BLD AUTO: 2.5 %
KETONES UR QL STRIP.AUTO: ABNORMAL
LACTIC ACID, SEPSIS: 1.7 MMOL/L (ref 0.5–1.9)
LACTIC ACID, SEPSIS: 1.7 MMOL/L (ref 0.5–1.9)
LIPASE SERPL-CCNC: 11.1 U/L (ref 5.6–51.3)
LYMPHOCYTES ABSOLUTE: 0.7 THOU/MM3 (ref 1–4.8)
LYMPHOCYTES NFR BLD AUTO: 2.2 %
MAGNESIUM SERPL-MCNC: 1.4 MG/DL (ref 1.6–2.4)
MCH RBC QN AUTO: 29.5 PG (ref 26–33)
MCHC RBC AUTO-ENTMCNC: 33.4 GM/DL (ref 32.2–35.5)
MCV RBC AUTO: 88.4 FL (ref 81–99)
MISCELLANEOUS 2: ABNORMAL
MISCELLANEOUS LAB TEST RESULT: ABNORMAL
MONOCYTES ABSOLUTE: 1.8 THOU/MM3 (ref 0.4–1.3)
MONOCYTES NFR BLD AUTO: 5.6 %
MUCOUS THREADS URNS QL MICRO: ABNORMAL
NEUTROPHILS NFR BLD AUTO: 89.3 %
NITRITE UR QL STRIP: POSITIVE
NRBC BLD AUTO-RTO: 0 /100 WBC
OSMOLALITY SERPL CALC.SUM OF ELEC: 270.6 MOSMOL/KG (ref 275–300)
PATHOLOGIST REVIEW: ABNORMAL
PH UR STRIP.AUTO: 5.5 [PH] (ref 5–9)
PLATELET # BLD AUTO: 270 THOU/MM3 (ref 130–400)
PLATELET BLD QL SMEAR: ADEQUATE
PMV BLD AUTO: 12.8 FL (ref 9.4–12.4)
POTASSIUM SERPL-SCNC: 4 MEQ/L (ref 3.5–5.2)
PROT SERPL-MCNC: 7.7 G/DL (ref 6.1–8)
PROT UR STRIP.AUTO-MCNC: 300 MG/DL
RBC # BLD AUTO: 4.64 MILL/MM3 (ref 4.2–5.4)
RBC URINE: ABNORMAL /HPF
RENAL EPI CELLS #/AREA URNS HPF: ABNORMAL /[HPF]
SARS-COV-2 RNA RESP QL NAA+PROBE: NOT DETECTED
SCAN OF BLOOD SMEAR: NORMAL
SEGMENTED NEUTROPHILS ABSOLUTE COUNT: 28.9 THOU/MM3 (ref 1.8–7.7)
SODIUM SERPL-SCNC: 132 MEQ/L (ref 135–145)
SP GR UR REFRACT.AUTO: 1.02 (ref 1–1.03)
TROPONIN T: < 0.01 NG/ML
UROBILINOGEN, URINE: 1 EU/DL (ref 0–1)
WBC # BLD AUTO: 32.4 THOU/MM3 (ref 4.8–10.8)
WBC #/AREA URNS HPF: > 200 /HPF
WBC #/AREA URNS HPF: ABNORMAL /[HPF]
YEAST LIKE FUNGI URNS QL MICRO: ABNORMAL

## 2023-02-26 PROCEDURE — 6360000002 HC RX W HCPCS: Performed by: STUDENT IN AN ORGANIZED HEALTH CARE EDUCATION/TRAINING PROGRAM

## 2023-02-26 PROCEDURE — 76770 US EXAM ABDO BACK WALL COMP: CPT

## 2023-02-26 PROCEDURE — 99223 1ST HOSP IP/OBS HIGH 75: CPT | Performed by: INTERNAL MEDICINE

## 2023-02-26 PROCEDURE — 84484 ASSAY OF TROPONIN QUANT: CPT

## 2023-02-26 PROCEDURE — 76705 ECHO EXAM OF ABDOMEN: CPT

## 2023-02-26 PROCEDURE — 85025 COMPLETE CBC W/AUTO DIFF WBC: CPT

## 2023-02-26 PROCEDURE — 81001 URINALYSIS AUTO W/SCOPE: CPT

## 2023-02-26 PROCEDURE — 2580000003 HC RX 258: Performed by: STUDENT IN AN ORGANIZED HEALTH CARE EDUCATION/TRAINING PROGRAM

## 2023-02-26 PROCEDURE — 74177 CT ABD & PELVIS W/CONTRAST: CPT

## 2023-02-26 PROCEDURE — 83605 ASSAY OF LACTIC ACID: CPT

## 2023-02-26 PROCEDURE — 84703 CHORIONIC GONADOTROPIN ASSAY: CPT

## 2023-02-26 PROCEDURE — 85379 FIBRIN DEGRADATION QUANT: CPT

## 2023-02-26 PROCEDURE — 80076 HEPATIC FUNCTION PANEL: CPT

## 2023-02-26 PROCEDURE — 87186 SC STD MICRODIL/AGAR DIL: CPT

## 2023-02-26 PROCEDURE — 87086 URINE CULTURE/COLONY COUNT: CPT

## 2023-02-26 PROCEDURE — 83735 ASSAY OF MAGNESIUM: CPT

## 2023-02-26 PROCEDURE — 96361 HYDRATE IV INFUSION ADD-ON: CPT

## 2023-02-26 PROCEDURE — 87040 BLOOD CULTURE FOR BACTERIA: CPT

## 2023-02-26 PROCEDURE — 6370000000 HC RX 637 (ALT 250 FOR IP): Performed by: STUDENT IN AN ORGANIZED HEALTH CARE EDUCATION/TRAINING PROGRAM

## 2023-02-26 PROCEDURE — 36415 COLL VENOUS BLD VENIPUNCTURE: CPT

## 2023-02-26 PROCEDURE — 87636 SARSCOV2 & INF A&B AMP PRB: CPT

## 2023-02-26 PROCEDURE — 93010 ELECTROCARDIOGRAM REPORT: CPT | Performed by: NUCLEAR MEDICINE

## 2023-02-26 PROCEDURE — 99285 EMERGENCY DEPT VISIT HI MDM: CPT

## 2023-02-26 PROCEDURE — 87077 CULTURE AEROBIC IDENTIFY: CPT

## 2023-02-26 PROCEDURE — 93005 ELECTROCARDIOGRAM TRACING: CPT | Performed by: STUDENT IN AN ORGANIZED HEALTH CARE EDUCATION/TRAINING PROGRAM

## 2023-02-26 PROCEDURE — 96365 THER/PROPH/DIAG IV INF INIT: CPT

## 2023-02-26 PROCEDURE — 83690 ASSAY OF LIPASE: CPT

## 2023-02-26 PROCEDURE — 1200000003 HC TELEMETRY R&B

## 2023-02-26 PROCEDURE — 6360000004 HC RX CONTRAST MEDICATION: Performed by: STUDENT IN AN ORGANIZED HEALTH CARE EDUCATION/TRAINING PROGRAM

## 2023-02-26 PROCEDURE — 80048 BASIC METABOLIC PNL TOTAL CA: CPT

## 2023-02-26 PROCEDURE — 71275 CT ANGIOGRAPHY CHEST: CPT

## 2023-02-26 PROCEDURE — 96375 TX/PRO/DX INJ NEW DRUG ADDON: CPT

## 2023-02-26 RX ORDER — ONDANSETRON 4 MG/1
4 TABLET, ORALLY DISINTEGRATING ORAL EVERY 8 HOURS PRN
Status: DISCONTINUED | OUTPATIENT
Start: 2023-02-26 | End: 2023-03-01 | Stop reason: HOSPADM

## 2023-02-26 RX ORDER — ACETAMINOPHEN 650 MG/1
650 SUPPOSITORY RECTAL EVERY 6 HOURS PRN
Status: DISCONTINUED | OUTPATIENT
Start: 2023-02-26 | End: 2023-03-01 | Stop reason: HOSPADM

## 2023-02-26 RX ORDER — SODIUM CHLORIDE, SODIUM LACTATE, POTASSIUM CHLORIDE, AND CALCIUM CHLORIDE .6; .31; .03; .02 G/100ML; G/100ML; G/100ML; G/100ML
1000 INJECTION, SOLUTION INTRAVENOUS ONCE
Status: COMPLETED | OUTPATIENT
Start: 2023-02-26 | End: 2023-02-26

## 2023-02-26 RX ORDER — ACETAMINOPHEN 325 MG/1
650 TABLET ORAL EVERY 6 HOURS PRN
Status: DISCONTINUED | OUTPATIENT
Start: 2023-02-26 | End: 2023-03-01 | Stop reason: HOSPADM

## 2023-02-26 RX ORDER — POLYETHYLENE GLYCOL 3350 17 G/17G
17 POWDER, FOR SOLUTION ORAL DAILY PRN
Status: DISCONTINUED | OUTPATIENT
Start: 2023-02-26 | End: 2023-03-01 | Stop reason: HOSPADM

## 2023-02-26 RX ORDER — DROPERIDOL 2.5 MG/ML
1.25 INJECTION, SOLUTION INTRAMUSCULAR; INTRAVENOUS ONCE
Status: COMPLETED | OUTPATIENT
Start: 2023-02-26 | End: 2023-02-26

## 2023-02-26 RX ORDER — 0.9 % SODIUM CHLORIDE 0.9 %
1000 INTRAVENOUS SOLUTION INTRAVENOUS ONCE
Status: DISCONTINUED | OUTPATIENT
Start: 2023-02-26 | End: 2023-02-26

## 2023-02-26 RX ORDER — MAGNESIUM SULFATE IN WATER 40 MG/ML
2000 INJECTION, SOLUTION INTRAVENOUS ONCE
Status: COMPLETED | OUTPATIENT
Start: 2023-02-26 | End: 2023-02-26

## 2023-02-26 RX ORDER — ENOXAPARIN SODIUM 100 MG/ML
40 INJECTION SUBCUTANEOUS DAILY
Status: DISCONTINUED | OUTPATIENT
Start: 2023-02-26 | End: 2023-03-01 | Stop reason: HOSPADM

## 2023-02-26 RX ORDER — CIPROFLOXACIN 2 MG/ML
400 INJECTION, SOLUTION INTRAVENOUS EVERY 12 HOURS
Status: DISCONTINUED | OUTPATIENT
Start: 2023-02-27 | End: 2023-02-26

## 2023-02-26 RX ORDER — SODIUM CHLORIDE 9 MG/ML
INJECTION, SOLUTION INTRAVENOUS PRN
Status: DISCONTINUED | OUTPATIENT
Start: 2023-02-26 | End: 2023-03-01 | Stop reason: HOSPADM

## 2023-02-26 RX ORDER — ONDANSETRON 2 MG/ML
4 INJECTION INTRAMUSCULAR; INTRAVENOUS ONCE
Status: COMPLETED | OUTPATIENT
Start: 2023-02-26 | End: 2023-02-26

## 2023-02-26 RX ORDER — DIPHENHYDRAMINE HYDROCHLORIDE 50 MG/ML
25 INJECTION INTRAMUSCULAR; INTRAVENOUS ONCE
Status: DISCONTINUED | OUTPATIENT
Start: 2023-02-26 | End: 2023-02-26

## 2023-02-26 RX ORDER — 0.9 % SODIUM CHLORIDE 0.9 %
1000 INTRAVENOUS SOLUTION INTRAVENOUS ONCE
Status: COMPLETED | OUTPATIENT
Start: 2023-02-26 | End: 2023-02-26

## 2023-02-26 RX ORDER — KETOROLAC TROMETHAMINE 30 MG/ML
15 INJECTION, SOLUTION INTRAMUSCULAR; INTRAVENOUS ONCE
Status: COMPLETED | OUTPATIENT
Start: 2023-02-26 | End: 2023-02-26

## 2023-02-26 RX ORDER — SODIUM CHLORIDE 0.9 % (FLUSH) 0.9 %
5-40 SYRINGE (ML) INJECTION PRN
Status: DISCONTINUED | OUTPATIENT
Start: 2023-02-26 | End: 2023-03-01 | Stop reason: HOSPADM

## 2023-02-26 RX ORDER — ONDANSETRON 2 MG/ML
4 INJECTION INTRAMUSCULAR; INTRAVENOUS EVERY 6 HOURS PRN
Status: DISCONTINUED | OUTPATIENT
Start: 2023-02-26 | End: 2023-03-01 | Stop reason: HOSPADM

## 2023-02-26 RX ORDER — SODIUM CHLORIDE 0.9 % (FLUSH) 0.9 %
5-40 SYRINGE (ML) INJECTION EVERY 12 HOURS SCHEDULED
Status: DISCONTINUED | OUTPATIENT
Start: 2023-02-26 | End: 2023-03-01 | Stop reason: HOSPADM

## 2023-02-26 RX ORDER — KETOROLAC TROMETHAMINE 30 MG/ML
15 INJECTION, SOLUTION INTRAMUSCULAR; INTRAVENOUS EVERY 6 HOURS PRN
Status: DISCONTINUED | OUTPATIENT
Start: 2023-02-26 | End: 2023-02-27

## 2023-02-26 RX ADMIN — SODIUM CHLORIDE, POTASSIUM CHLORIDE, SODIUM LACTATE AND CALCIUM CHLORIDE 1000 ML: 600; 310; 30; 20 INJECTION, SOLUTION INTRAVENOUS at 10:25

## 2023-02-26 RX ADMIN — ENOXAPARIN SODIUM 40 MG: 100 INJECTION SUBCUTANEOUS at 10:28

## 2023-02-26 RX ADMIN — SODIUM CHLORIDE, POTASSIUM CHLORIDE, SODIUM LACTATE AND CALCIUM CHLORIDE 1000 ML: 600; 310; 30; 20 INJECTION, SOLUTION INTRAVENOUS at 08:31

## 2023-02-26 RX ADMIN — ONDANSETRON 4 MG: 2 INJECTION INTRAMUSCULAR; INTRAVENOUS at 20:04

## 2023-02-26 RX ADMIN — ACETAMINOPHEN 650 MG: 325 TABLET ORAL at 13:53

## 2023-02-26 RX ADMIN — PIPERACILLIN AND TAZOBACTAM 4500 MG: 4; .5 INJECTION, POWDER, FOR SOLUTION INTRAVENOUS at 07:51

## 2023-02-26 RX ADMIN — ONDANSETRON 4 MG: 2 INJECTION INTRAMUSCULAR; INTRAVENOUS at 06:22

## 2023-02-26 RX ADMIN — SODIUM CHLORIDE, PRESERVATIVE FREE 10 ML: 5 INJECTION INTRAVENOUS at 20:07

## 2023-02-26 RX ADMIN — SODIUM CHLORIDE 1000 ML: 9 INJECTION, SOLUTION INTRAVENOUS at 06:15

## 2023-02-26 RX ADMIN — IOPAMIDOL 80 ML: 755 INJECTION, SOLUTION INTRAVENOUS at 07:30

## 2023-02-26 RX ADMIN — DROPERIDOL 1.25 MG: 2.5 INJECTION, SOLUTION INTRAMUSCULAR; INTRAVENOUS at 08:28

## 2023-02-26 RX ADMIN — KETOROLAC TROMETHAMINE 15 MG: 30 INJECTION, SOLUTION INTRAMUSCULAR; INTRAVENOUS at 20:03

## 2023-02-26 RX ADMIN — KETOROLAC TROMETHAMINE 15 MG: 30 INJECTION, SOLUTION INTRAMUSCULAR; INTRAVENOUS at 06:20

## 2023-02-26 RX ADMIN — MAGNESIUM SULFATE HEPTAHYDRATE 2000 MG: 40 INJECTION, SOLUTION INTRAVENOUS at 08:31

## 2023-02-26 ASSESSMENT — LIFESTYLE VARIABLES: HOW OFTEN DO YOU HAVE A DRINK CONTAINING ALCOHOL: NEVER

## 2023-02-26 ASSESSMENT — PAIN DESCRIPTION - ONSET: ONSET: ON-GOING

## 2023-02-26 ASSESSMENT — PAIN - FUNCTIONAL ASSESSMENT: PAIN_FUNCTIONAL_ASSESSMENT: 0-10

## 2023-02-26 ASSESSMENT — PAIN SCALES - GENERAL
PAINLEVEL_OUTOF10: 7
PAINLEVEL_OUTOF10: 0
PAINLEVEL_OUTOF10: 10

## 2023-02-26 ASSESSMENT — PAIN DESCRIPTION - FREQUENCY: FREQUENCY: CONTINUOUS

## 2023-02-26 NOTE — PROGRESS NOTES
Pt arrived in 8B 21 from ED and via cart/stretcher. Complaints: None.   Oriented to room and call light in reach

## 2023-02-26 NOTE — ED PROVIDER NOTES
[facility]ERGENCY DEPT    EMERGENCY MEDICINE     Pt Name: Vinnie Patel  MRN: [id number]  [place] 2000  Date of evaluation: 2/26/2023  Treating Resident Physician: Leopoldo Srinivasan MD  Supervising Physician: Lenny Poon MD / Gertrude Castro, [address] [place] [address]       Chief Complaint   Patient presents with    Nausea    Emesis     History obtained from chart review and the patient. HISTORY OF PRESENT ILLNESS    HPI    Vinnie Patel is a 23 y.o. female with a past medical history significant for recent admission for abdominal pain, obstructing stone with pyelonephritis, placement of right-sided ureteral stent,  shunt, prior cholecystectomy, tubal ligation, ADHD , presents to the emergency department for evaluation of nausea, vomiting, abdominal pain, chest pain. Patient was admitted at the end of January with an obstructing stone and pyelonephritis. She had a right-sided ureteral stent placed with Dr. Tatiana Lucia. Since being discharged she has been doing better with a decrease in her symptoms. Notes that yesterday she started having nausea, and numerous episodes of vomiting, abdominal pain, also stating that her heart hurts. No pain with inspiration, no fevers, no headache. Has tried ondansetron ODT at home without any improvement in her symptoms. The patient has no other acute complaints at this time.        REVIEW OF SYSTEMS   Review of Systems  Negative unless documented in HPI    PAST MEDICAL AND SURGICAL HISTORY     Past Medical History:   Diagnosis Date    ADHD (attention deficit hyperactivity disorder)     Learning difficulty     pt states only able to read at 2nd grade level    Premature baby     27 weeks       Past Surgical History:   Procedure Laterality Date    ACHILLES TENDON SURGERY      CHOLECYSTECTOMY, LAPAROSCOPIC N/A 7/16/2017    LAPAROSCOPY; OPEN CHOLECYSTECTOMY performed by Bhavana Forbes MD at [address] [place] [address]      revision  shunt [facility] Right 1/26/2023    Cystoscopy Right Stent Insertion performed by Ramy Silva MD at [address]     Current Discharge Medication List        CONTINUE these medications which have NOT CHANGED    Details   ondansetron (ZOFRAN-ODT) 4 MG disintegrating tablet Take 1 tablet by mouth every 8 hours as needed for Nausea or Vomiting  Qty: 12 tablet, Refills: 0             ALLERGIES   No Known Allergies    FAMILY HISTORY     Family History   Problem Relation Age of Onset    Cancer Mother     COPD Father     Asthma Brother     COPD Brother        SOCIAL HISTORY     Social History     Tobacco Use    Smoking status: Never    Smokeless tobacco: Never   Vaping Use    Vaping Use: Every day    Substances: Flavoring    Devices: WHMSOFTble tank   Substance Use Topics    Alcohol use: Yes     Comment: occasionally    Drug use: No       PHYSICAL EXAM     ED Triage Vitals [02/26/23 0538]   BP Temp Temp Source Heart Rate Resp SpO2 Height Weight   100/64 99 °F (37.2 °C) Oral (!) 149 18 94 % 5' 2\" (1.575 m) 195 lb (88.5 kg)       Vitals Reviewed:    Vitals:    02/26/23 1215 02/26/23 1345 02/26/23 1545 02/26/23 1604   BP:    (!) 81/46   Pulse: (!) 140 (!) 135 (!) 112 (!) 126   Resp:    18   Temp:  (!) 102 °F (38.9 °C)  98.4 °F (36.9 °C)   TempSrc:  Oral  Oral   SpO2:    90%   Weight:       Height:           Initial vital signs and nursing assessment reviewed and abnormal from tachycardia, hypoxia . Body mass index is 35.28 kg/m². Pulsoximetry is abnormal per my interpretation. Physical Exam  Vitals and nursing note reviewed. Constitutional:       General: She is not in acute distress. Appearance: Normal appearance. She is normal weight. She is not ill-appearing, toxic-appearing or diaphoretic. HENT:      Head: Normocephalic and atraumatic. Nose: Nose normal.      Mouth/Throat:      Mouth: Mucous membranes are dry.    Eyes:      Conjunctiva/sclera: Conjunctivae normal. Cardiovascular:      Rate and Rhythm: Regular rhythm. Tachycardia present. Pulses: Normal pulses. Heart sounds: Normal heart sounds. Pulmonary:      Effort: Pulmonary effort is normal.      Breath sounds: Normal breath sounds. Abdominal:      General: A surgical scar is present. Bowel sounds are normal.      Palpations: Abdomen is soft. Tenderness: There is abdominal tenderness in the right upper quadrant and right lower quadrant. There is no right CVA tenderness, left CVA tenderness, guarding or rebound. Musculoskeletal:      Cervical back: Normal range of motion. Skin:     General: Skin is warm and dry. Capillary Refill: Capillary refill takes less than 2 seconds. Neurological:      Mental Status: She is alert and oriented to person, place, and time. Psychiatric:         Mood and Affect: Affect is flat. FORMAL DIAGNOSTIC RESULTS     RADIOLOGY: Interpretation per the Radiologist below, if available at the time of this note (none if blank):    US RENAL COMPLETE   Final Result   No suspicious findings. **This report has been created using voice recognition software. It may contain minor errors which are inherent in voice recognition technology. **      Final report electronically signed by Dr Sudarshan Harrington on 2/26/2023 1:59 PM      US LIVER   Final Result   No suspicious findings. **This report has been created using voice recognition software. It may contain minor errors which are inherent in voice recognition technology. **      Final report electronically signed by Dr Sudarshan Harrington on 2/26/2023 1:59 PM      CT ABDOMEN PELVIS W IV CONTRAST Additional Contrast? None   Final Result       1. Abnormal appearing right kidney. This appears large and there is decreased enhancement of the right kidney compared to the left. These findings are suggestive of pyelonephritis. Other etiologies are not excluded. An infiltrating process is also a    consideration.    2. Normal-appearing right ureteral stent. **This report has been created using voice recognition software. It may contain minor errors which are inherent in voice recognition technology. **      Final report electronically signed by Dr. Reagan Huang on 2/26/2023 8:37 AM      CTA CHEST [address]   Final Result       1. No pulmonary emboli. 2. Faint interstitial opacities throughout both lungs. These were also present previously. This can represent infiltrates. An infiltrating process or pulmonary edema are also considerations. 3. 2 cm well-circumscribed right breast mass. An elective targeted right breast ultrasound is recommended. **This report has been created using voice recognition software. It may contain minor errors which are inherent in voice recognition technology. **      Final report electronically signed by Dr. Reagan Huang on 2/26/2023 8:30 AM          LABS: (none if blank)  Labs Reviewed   HEPATIC FUNCTION PANEL - Abnormal; Notable for the following components:       Result Value    Total Bilirubin 1.6 (*)     Bilirubin, Direct 1.0 (*)     Alkaline Phosphatase 129 (*)     All other components within normal limits   BASIC METABOLIC PANEL W/ REFLEX TO MG FOR LOW K - Abnormal; Notable for the following components:    Sodium 132 (*)     Chloride 94 (*)     CO2 21 (*)     Glucose 167 (*)     Creatinine 1.5 (*)     All other components within normal limits   CBC WITH AUTO DIFFERENTIAL - Abnormal; Notable for the following components:    WBC 32.4 (*)     RDW-CV 14.6 (*)     RDW-SD 47.3 (*)     MPV 12.8 (*)     Segs Absolute 28.9 (*)     Lymphocytes Absolute 0.7 (*)     Monocytes Absolute 1.8 (*)     Immature Grans (Abs) 0.80 (*)     All other components within normal limits   MAGNESIUM - Abnormal; Notable for the following components:    Magnesium 1.4 (*)     All other components within normal limits   D-DIMER, QUANTITATIVE - Abnormal; Notable for the following components: D-Dimer, Quant 1781.00 (*)     All other components within normal limits   ANION GAP - Abnormal; Notable for the following components:    Anion Gap 17.0 (*)     All other components within normal limits   GLOMERULAR FILTRATION RATE, ESTIMATED - Abnormal; Notable for the following components:    Est, Glom Filt Rate 49 (*)     All other components within normal limits   OSMOLALITY - Abnormal; Notable for the following components:    Osmolality Calc 270.6 (*)     All other components within normal limits   URINE WITH REFLEXED MICRO - Abnormal; Notable for the following components:    Bilirubin Urine SMALL (*)     Ketones, Urine TRACE (*)     Blood, Urine LARGE (*)     Protein,  (*)     Nitrite, Urine POSITIVE (*)     Leukocyte Esterase, Urine LARGE (*)     Color, UA DK YELLOW (*)     Character, Urine CLOUDY (*)     All other components within normal limits   BILE ACIDS, TOTAL - Abnormal; Notable for the following components:    Ictotest POSITIVE (*)     All other components within normal limits   COVID-19 & INFLUENZA COMBO   CULTURE, BLOOD 1    Narrative:     Epic Plan - [id number]   CULTURE, BLOOD 2    Narrative:     Epic Plan - [id number]   CULTURE, REFLEXED, URINE    Narrative:     Epic Plan - [id number]   LIPASE   LACTATE, SEPSIS   LACTATE, SEPSIS   TROPONIN   HCG, SERUM, QUALITATIVE   SCAN OF BLOOD SMEAR       (Any cultures that may have been sent were not resulted at the time of this patient visit)    [facility]     ED COURSE:  ED Course as of 02/26/23 1650   Sun Feb 26, 2023   0639 Call to lab, normal labs not yet in process. [SC]   6654 WBC(!!): 32.4  Call from lab with critical result at [phone] with WBC 32.4.  [SC]   0653 Review of recent urine cultures shows growth of contaminants for last few cultures, will obtain urine via straight cath today. Piperacillin-tazobactam ordered. [SC]   0756 Creatinine(!): 1.5  SAM [SC]   0807 Updated patient on results and plan for admission, she is agreeable to plan. [SC]   [id number] Referral for admission sent to [facility]) via [address].    [SC]   4780 Call to Dr. Gerald Bowen (Urology) no answer, perfect serve sent instead.  [SC]   2254 Patient was agitated briefly, however has appeared to calm by down. [SC]      ED Course User Index  [SC] William Collins MD         ED MEDICATIONS ADMINISTERED:  (None if blank)  Medications   sodium chloride flush 0.9 % injection 5-40 mL (5 mLs IntraVENous Not Given 2/26/23 1027)   sodium chloride flush 0.9 % injection 5-40 mL (has no administration in time range)   0.9 % sodium chloride infusion (has no administration in time range)   enoxaparin (LOVENOX) injection 40 mg (40 mg SubCUTAneous Given 2/26/23 1028)   ondansetron (ZOFRAN-ODT) disintegrating tablet 4 mg (has no administration in time range)     Or   ondansetron (ZOFRAN) injection 4 mg (has no administration in time range)   acetaminophen (TYLENOL) tablet 650 mg (650 mg Oral Given 2/26/23 1353)     Or   acetaminophen (TYLENOL) suppository 650 mg ( Rectal See Alternative 2/26/23 1353)   polyethylene glycol (GLYCOLAX) packet 17 g (has no administration in time range)   magnesium hydroxide (MILK OF MAGNESIA) 400 MG/5ML suspension 30 mL (has no administration in time range)   cefTRIAXone (ROCEPHIN) 1,000 mg in sodium chloride 0.9 % 50 mL IVPB (mini-bag) (has no administration in time range)   ketorolac (TORADOL) injection 15 mg (has no administration in time range)   ketorolac (TORADOL) injection 15 mg (15 mg IntraVENous Given 2/26/23 0620)   ondansetron (ZOFRAN) injection 4 mg (4 mg IntraVENous Given 2/26/23 0622)   lactated ringers bolus (0 mLs IntraVENous Stopped 2/26/23 1010)   0.9 % sodium chloride bolus (0 mLs IntraVENous Stopped 2/26/23 0818)   piperacillin-tazobactam (ZOSYN) 4,500 mg in sodium chloride 0.9 % 100 mL IVPB (mini-bag) (0 mg IntraVENous Stopped 2/26/23 0821)   iopamidol (ISOVUE-370) 76 % injection 80 mL (80 mLs IntraVENous Given 2/26/23 0730)   magnesium sulfate 2000 mg in 50 mL IVPB premix (0 mg IntraVENous Stopped 2/26/23 1027)   droperidol (INAPSINE) injection 1.25 mg (1.25 mg IntraVENous Given 2/26/23 0828)   lactated ringers bolus (0 mLs IntraVENous Stopped 2/26/23 1400)         PROCEDURES: (None if blank)  Procedures:     CONSULTANTS:  IP CONSULT TO UROLOGY    CRITICAL CARE: (None if blank)      DISCHARGE PRESCRIPTIONS: (None if blank)  Current Discharge Medication List          MDM:   Medical Decision Making  Brief Overview: 78-year-old female with history of recent obstructing right-sided stone with pyelonephritis, status postplacement of a right ureteral stent. Now presenting with 1-day history of nausea, vomiting, upper and lower right quadrant abdominal pain, chest pain. Initial presentation she is tachycardic with what appears to be sinus tach in the 140s, SPO2 in the low 90s to high 80s with a good waveform. Initial Differential: Includes but is not limited to sepsis, urinary tract infection, obstructing stone, pulmonary embolism, pneumonia, pancreatitis, choledocholithiasis,  Differentials considered but are unlikely include  shunt malfunction (no headache, no change in mental status)    Initial Testing: ECG, monitor, CBC, BMP, LFT, lipase, troponin, lactate, magnesium, D-dimer, urinalysis, swab for COVID-19 influenza, CTA chest, CT abdomen pelvis with contrast    Initial Treatment: IV fluid bolus, ondansetron, ketorolac    ____________    Final Therapy: Piperacillin-tazobactam, additional IV fluids, droperidol    Final Testing: Urine positive for a urinary tract infection, CTA of the chest negative for pulmonary embolism, CT abdomen showing a right pyelonephritis, stent in okay position. Discrepancies: None    Final Differential: Sepsis, acute kidney injury, pyelonephritis    Aftercare: Hospitalist service consulted for admission. Patient also discussed with on-call urology, Dr. Graciela Alvarez.      Problems Addressed:  Acute kidney injury [facility]): undiagnosed new problem with uncertain prognosis  Acute pyelonephritis: undiagnosed new problem with uncertain prognosis  Sepsis, due to unspecified organism, unspecified whether acute organ dysfunction present [facility]): undiagnosed new problem with uncertain prognosis    Amount and/or Complexity of Data Reviewed  External Data Reviewed: notes. Details: prior admission, prior Urology op notes  Labs: ordered. Decision-making details documented in ED Course. Radiology: ordered and independent interpretation performed. Decision-making details documented in ED Course. ECG/medicine tests: ordered and independent interpretation performed. Decision-making details documented in ED Course. Discussion of management or test interpretation with external provider(s): Urology, Hospitalist    Risk  Parenteral controlled substances. Decision regarding hospitalization. FINAL IMPRESSION     Final diagnoses:   Sepsis, due to unspecified organism, unspecified whether acute organ dysfunction present ([facility]   Acute kidney injury ([facility]   Acute pyelonephritis       DISPOSITION / PLAN   DISPOSITION Admitted 02/26/2023 08:40:26 AM      This transcription was electronically signed. Parts of this transcriptions may have been dictated by use of voice recognition software and electronically transcribed, and parts may have been transcribed with the assistance of an ED scribe. The transcription may contain errors not detected in proofreading. Please refer to my supervising physician's documentation if my documentation differs.     Electronically Signed: Danielle Alaniz MD, 02/26/23, 4:50 PM         Roly Prado MD  Resident  02/26/23 0950

## 2023-02-26 NOTE — ED NOTES
Bedside report received. Patient resting on cart in no distress.      Danyel Douglas RN  02/26/23 7829

## 2023-02-26 NOTE — PLAN OF CARE
Problem: Discharge Planning  Goal: Discharge to home or other facility with appropriate resources  Outcome: Progressing  Flowsheets  Taken 2/26/2023 1118  Discharge to home or other facility with appropriate resources: Identify barriers to discharge with patient and caregiver  Taken 2/26/2023 1011  Discharge to home or other facility with appropriate resources: Identify barriers to discharge with patient and caregiver     Problem: Pain  Goal: Verbalizes/displays adequate comfort level or baseline comfort level  Outcome: Progressing  Flowsheets (Taken 2/26/2023 1118)  Verbalizes/displays adequate comfort level or baseline comfort level:   Encourage patient to monitor pain and request assistance   Assess pain using appropriate pain scale     Problem: Safety - Adult  Goal: Free from fall injury  Outcome: Progressing  Flowsheets (Taken 2/26/2023 1118)  Free From Fall Injury: Instruct family/caregiver on patient safety     Problem: Cardiovascular - Adult  Goal: Maintains optimal cardiac output and hemodynamic stability  Outcome: Progressing  Flowsheets (Taken 2/26/2023 1118)  Maintains optimal cardiac output and hemodynamic stability:   Monitor blood pressure and heart rate   Assess for signs of decreased cardiac output  Goal: Absence of cardiac dysrhythmias or at baseline  Outcome: Progressing  Flowsheets (Taken 2/26/2023 1118)  Absence of cardiac dysrhythmias or at baseline: Monitor cardiac rate and rhythm     Problem: Gastrointestinal - Adult  Goal: Minimal or absence of nausea and vomiting  Outcome: Progressing  Flowsheets (Taken 2/26/2023 1118)  Minimal or absence of nausea and vomiting:   Administer IV fluids as ordered to ensure adequate hydration   Administer ordered antiemetic medications as needed   Advance diet as tolerated, if ordered  Goal: Maintains adequate nutritional intake  Outcome: Progressing  Flowsheets (Taken 2/26/2023 1118)  Maintains adequate nutritional intake: Monitor percentage of each meal consumed     Problem: Genitourinary - Adult  Goal: Absence of urinary retention  Outcome: Progressing  Flowsheets (Taken 2/26/2023 1118)  Absence of urinary retention: Assess patients ability to void and empty bladder     Problem: Infection - Adult  Goal: Absence of infection at discharge  Outcome: Progressing  Flowsheets (Taken 2/26/2023 1118)  Absence of infection at discharge:   Assess and monitor for signs and symptoms of infection   Monitor lab/diagnostic results    Care plan reviewed with patient and family. Patient and family verbalize understanding of the plan of care and contribute to goal setting.

## 2023-02-26 NOTE — ED TRIAGE NOTES
Pt presents to ED for concerns of emesis. Pt states onset on 2/25 and has not been able to eat or drink very much. Pt rate current pain 10/10. Denies CP or SOB. Vitals, labs obtained.

## 2023-02-26 NOTE — H&P
Hospitalist - History & Physical      Patient: Ila [facility]    Unit/Bed:18/018A  YOB: 2000  MRN: [id number]   Acct: [de-identified]   PCP: DARI Coleman CNP    Date of Service: Pt seen/examined on 02/26/23  and Admitted to Inpatient with expected LOS greater than two midnights due to medical therapy. Chief Complaint: UTI, diffuse abdominal pain, flank pain, history of kidney stones and stent    Assessment and Plan:-  Sepsis 2/2 UTI -SIRS (+), afebrile, tachycardic, tachypneic, WBC 32.  Lactic acid 1.7 S/p 30 cc/kg IVF resuscitation. Vitals initially soft 90/60, but fluid responsive. Pan-cultures sent. Complicated UTI, suspected R pyelonephritis -received initial Zosyn in ED. Continue Rocephin 1 g IV daily (9 days) and narrow if possible to Cipro/Levaquin IV or PO depending on cultures with sensitivities (e.g if e.coli <10% fluoroquinolone resistance). S/p 30 cc/kg fluids. History of kidney stones and right ureteral stent. CT abdomen suggestive of right pyelonephritis. Ordered ultrasound. 2 cm right breast mass -elective targeted right breast ultrasound recommended. Direct hyperbilirubinemia -positive Sosa sign, however gallbladder was removed. We will obtain an ultrasound to evaluate alternative obstruction of the CBD      History Of Present Illness:    Patient is a 77-year-old female past medical history of kidney stones with right ureteral stent, ADHD and learning difficulty who was admitted to [address] after presenting to the ED with nausea vomiting abdominal pain. Patient was previously admitted in January 2023 after she was found to have an obstructing stone and pyelonephritis for which she was treated with antibiotics and underwent stent placement with Dr. Bird Pickard. She was doing fine until yesterday developed acute abdominal pain with nausea and some vomiting, nonbloody.   In the ED patient met SIRS criteria and was found to have a UTI as well as possible right pyelonephritis, was admitted for further management and care. S/p sepsis fluids, initiated on empiric antibiotics with Rocephin. Past Medical History:        Diagnosis Date    ADHD (attention deficit hyperactivity disorder)     Learning difficulty     pt states only able to read at 2nd grade level    Premature baby     27 weeks       Past Surgical History:        Procedure Laterality Date    ACHILLES TENDON SURGERY      CHOLECYSTECTOMY, LAPAROSCOPIC N/A 7/16/2017    LAPAROSCOPY; OPEN CHOLECYSTECTOMY performed by Adri Howard MD at [address] [place] [address]      revision  shunt 1999    URETER SURGERY Right 1/26/2023    Cystoscopy Right Stent Insertion performed by Radha Cagle MD at [address] [place] [address] Medications:   No current facility-administered medications on file prior to encounter. Current Outpatient Medications on File Prior to Encounter   Medication Sig Dispense Refill    ondansetron (ZOFRAN-ODT) 4 MG disintegrating tablet Take 1 tablet by mouth every 8 hours as needed for Nausea or Vomiting (Patient not taking: No sig reported) 12 tablet 0       Allergies:    Patient has no known allergies. Social History:    reports that she has never smoked. She has never used smokeless tobacco. She reports current alcohol use. She reports that she does not use drugs. Family History:       Problem Relation Age of Onset    Cancer Mother     COPD Father     Asthma Brother     COPD Brother        Diet:  ADULT DIET; Regular    Review of systems:   Pertinent positives as noted in the HPI. All other systems reviewed and negative. PHYSICAL EXAM:  BP 94/65   Pulse (!) 128   Temp 99 °F (37.2 °C) (Oral)   Resp 17   Ht 5' 2\" (1.575 m)   Wt 195 lb (88.5 kg)   SpO2 97%   BMI 35.67 kg/m²   Physical Exam  Vitals and nursing note reviewed. Constitutional:       Appearance: Normal appearance. HENT:      Head: Normocephalic and atraumatic.       Right Ear: External ear normal.      Left Ear: External ear normal.      Nose: Nose normal.   Eyes:      Extraocular Movements: Extraocular movements intact. Cardiovascular:      Rate and Rhythm: Normal rate and regular rhythm. Pulses: Normal pulses. Heart sounds: Normal heart sounds. Pulmonary:      Effort: Pulmonary effort is normal.      Breath sounds: Normal breath sounds. Abdominal:      General: Abdomen is flat. Palpations: Abdomen is soft. Tenderness: There is abdominal tenderness. There is right CVA tenderness and left CVA tenderness. Musculoskeletal:         General: No deformity. Normal range of motion. Cervical back: Normal range of motion and neck supple. Skin:     General: Skin is warm and dry. Capillary Refill: Capillary refill takes less than 2 seconds. Neurological:      General: No focal deficit present. Mental Status: She is alert. Mental status is at baseline. Psychiatric:         Mood and Affect: Mood normal.         Behavior: Behavior normal.         Labs:   Recent Labs     02/26/23 0615   WBC 32.4*   HGB 13.7   HCT 41.0        Recent Labs     02/26/23 0615   *   K 4.0   CL 94*   CO2 21*   BUN 19   CREATININE 1.5*   CALCIUM 9.1     Recent Labs     02/26/23 0615   AST 35   ALT 44   BILIDIR 1.0*   BILITOT 1.6*   ALKPHOS 129*     No results for input(s): INR in the last 72 hours. No results for input(s): Ceil Coke in the last 72 hours. Urinalysis:    Lab Results   Component Value Date/Time    NITRU POSITIVE 02/26/2023 07:00 AM    WBCUA > 200 02/26/2023 07:00 AM    BACTERIA MODERATE 02/26/2023 07:00 AM    RBCUA 3-5 02/26/2023 07:00 AM    BLOODU LARGE 02/26/2023 07:00 AM    SPECGRAV 1.009 01/26/2023 08:55 AM    GLUCOSEU NEGATIVE 02/26/2023 07:00 AM       Radiology:   CT ABDOMEN PELVIS W IV CONTRAST Additional Contrast? None   Final Result       1. Abnormal appearing right kidney.  This appears large and there is decreased enhancement of the right kidney compared to the left. These findings are suggestive of pyelonephritis. Other etiologies are not excluded. An infiltrating process is also a    consideration. 2. Normal-appearing right ureteral stent. **This report has been created using voice recognition software. It may contain minor errors which are inherent in voice recognition technology. **      Final report electronically signed by Dr. Fidel Saini on 2/26/2023 8:37 AM      CTA CHEST [address]   Final Result       1. No pulmonary emboli. 2. Faint interstitial opacities throughout both lungs. These were also present previously. This can represent infiltrates. An infiltrating process or pulmonary edema are also considerations. 3. 2 cm well-circumscribed right breast mass. An elective targeted right breast ultrasound is recommended. **This report has been created using voice recognition software. It may contain minor errors which are inherent in voice recognition technology. **      Final report electronically signed by Dr. Fidel Saini on 2/26/2023 8:30 AM      US RENAL COMPLETE    (Results Pending)   [address] organ? GALLBLADDER    (Results Pending)         EKG:  Unable to independently review. Reviewed by ED provider.     Electronically signed by Guerrero Vasquez MD on 2/26/2023 at 8:40 AM

## 2023-02-26 NOTE — ED NOTES
Pt transported to 8B by cart in stable condition. Called 8B and informed them that the patient was on their way to the unit.       Natali Garcia LPN  11/75/58 [id number]

## 2023-02-27 ENCOUNTER — ANESTHESIA EVENT (OUTPATIENT)
Dept: OPERATING ROOM | Age: 23
DRG: 720 | End: 2023-02-27
Payer: MEDICAID

## 2023-02-27 ENCOUNTER — ANESTHESIA (OUTPATIENT)
Dept: OPERATING ROOM | Age: 23
DRG: 720 | End: 2023-02-27
Payer: MEDICAID

## 2023-02-27 LAB
ALBUMIN SERPL BCG-MCNC: 2.8 G/DL (ref 3.5–5.1)
ALP SERPL-CCNC: 102 U/L (ref 38–126)
ALT SERPL W/O P-5'-P-CCNC: 24 U/L (ref 11–66)
ANION GAP SERPL CALC-SCNC: 9 MEQ/L (ref 8–16)
AST SERPL-CCNC: 14 U/L (ref 5–40)
BACTERIA UR CULT: ABNORMAL
BASOPHILS ABSOLUTE: 0 THOU/MM3 (ref 0–0.1)
BASOPHILS NFR BLD AUTO: 0.2 %
BILIRUB CONJ SERPL-MCNC: 0.7 MG/DL (ref 0–0.3)
BILIRUB SERPL-MCNC: 1.1 MG/DL (ref 0.3–1.2)
BUN SERPL-MCNC: 14 MG/DL (ref 7–22)
CALCIUM SERPL-MCNC: 8 MG/DL (ref 8.5–10.5)
CHLORIDE SERPL-SCNC: 102 MEQ/L (ref 98–111)
CHOLEST SERPL-MCNC: 101 MG/DL (ref 100–199)
CO2 SERPL-SCNC: 26 MEQ/L (ref 23–33)
CREAT SERPL-MCNC: 1.2 MG/DL (ref 0.4–1.2)
DEPRECATED RDW RBC AUTO: 49.2 FL (ref 35–45)
EKG ATRIAL RATE: 147 BPM
EKG P AXIS: 34 DEGREES
EKG P-R INTERVAL: 122 MS
EKG Q-T INTERVAL: 256 MS
EKG QRS DURATION: 70 MS
EKG QTC CALCULATION (BAZETT): 400 MS
EKG R AXIS: 60 DEGREES
EKG T AXIS: 2 DEGREES
EKG VENTRICULAR RATE: 147 BPM
EOSINOPHIL NFR BLD AUTO: 0.2 %
EOSINOPHILS ABSOLUTE: 0 THOU/MM3 (ref 0–0.4)
ERYTHROCYTE [DISTWIDTH] IN BLOOD BY AUTOMATED COUNT: 14.6 % (ref 11.5–14.5)
GFR SERPL CREATININE-BSD FRML MDRD: > 60 ML/MIN/1.73M2
GLUCOSE SERPL-MCNC: 107 MG/DL (ref 70–108)
HCT VFR BLD AUTO: 36.3 % (ref 37–47)
HDLC SERPL-MCNC: 39 MG/DL
HGB BLD-MCNC: 11.5 GM/DL (ref 12–16)
IMM GRANULOCYTES # BLD AUTO: 0.15 THOU/MM3 (ref 0–0.07)
IMM GRANULOCYTES NFR BLD AUTO: 0.8 %
LACTATE SERPL-SCNC: 1 MMOL/L (ref 0.5–2)
LDLC SERPL CALC-MCNC: 45 MG/DL
LYMPHOCYTES ABSOLUTE: 1.3 THOU/MM3 (ref 1–4.8)
LYMPHOCYTES NFR BLD AUTO: 7.4 %
MCH RBC QN AUTO: 29 PG (ref 26–33)
MCHC RBC AUTO-ENTMCNC: 31.7 GM/DL (ref 32.2–35.5)
MCV RBC AUTO: 91.4 FL (ref 81–99)
MONOCYTES ABSOLUTE: 1.3 THOU/MM3 (ref 0.4–1.3)
MONOCYTES NFR BLD AUTO: 7.1 %
NEUTROPHILS NFR BLD AUTO: 84.3 %
NRBC BLD AUTO-RTO: 0 /100 WBC
ORGANISM: ABNORMAL
PLATELET # BLD AUTO: 187 THOU/MM3 (ref 130–400)
PMV BLD AUTO: 11.8 FL (ref 9.4–12.4)
POTASSIUM SERPL-SCNC: 3.7 MEQ/L (ref 3.5–5.2)
PROT SERPL-MCNC: 5.4 G/DL (ref 6.1–8)
RBC # BLD AUTO: 3.97 MILL/MM3 (ref 4.2–5.4)
SEGMENTED NEUTROPHILS ABSOLUTE COUNT: 15 THOU/MM3 (ref 1.8–7.7)
SODIUM SERPL-SCNC: 137 MEQ/L (ref 135–145)
TRIGL SERPL-MCNC: 85 MG/DL (ref 0–199)
URATE SERPL-MCNC: 2.9 MG/DL (ref 2.4–5.7)
WBC # BLD AUTO: 17.8 THOU/MM3 (ref 4.8–10.8)

## 2023-02-27 PROCEDURE — 6360000002 HC RX W HCPCS: Performed by: STUDENT IN AN ORGANIZED HEALTH CARE EDUCATION/TRAINING PROGRAM

## 2023-02-27 PROCEDURE — 36415 COLL VENOUS BLD VENIPUNCTURE: CPT

## 2023-02-27 PROCEDURE — 6360000002 HC RX W HCPCS: Performed by: NURSE ANESTHETIST, CERTIFIED REGISTERED

## 2023-02-27 PROCEDURE — C1769 GUIDE WIRE: HCPCS | Performed by: UROLOGY

## 2023-02-27 PROCEDURE — 6360000002 HC RX W HCPCS: Performed by: UROLOGY

## 2023-02-27 PROCEDURE — 3600000012 HC SURGERY LEVEL 2 ADDTL 15MIN: Performed by: UROLOGY

## 2023-02-27 PROCEDURE — 85025 COMPLETE CBC W/AUTO DIFF WBC: CPT

## 2023-02-27 PROCEDURE — 2580000003 HC RX 258: Performed by: STUDENT IN AN ORGANIZED HEALTH CARE EDUCATION/TRAINING PROGRAM

## 2023-02-27 PROCEDURE — 84550 ASSAY OF BLOOD/URIC ACID: CPT

## 2023-02-27 PROCEDURE — 80053 COMPREHEN METABOLIC PANEL: CPT

## 2023-02-27 PROCEDURE — 80061 LIPID PANEL: CPT

## 2023-02-27 PROCEDURE — 99254 IP/OBS CNSLTJ NEW/EST MOD 60: CPT | Performed by: UROLOGY

## 2023-02-27 PROCEDURE — 7100000000 HC PACU RECOVERY - FIRST 15 MIN: Performed by: UROLOGY

## 2023-02-27 PROCEDURE — 6360000002 HC RX W HCPCS

## 2023-02-27 PROCEDURE — 3700000000 HC ANESTHESIA ATTENDED CARE: Performed by: UROLOGY

## 2023-02-27 PROCEDURE — 2580000003 HC RX 258

## 2023-02-27 PROCEDURE — 3600000002 HC SURGERY LEVEL 2 BASE: Performed by: UROLOGY

## 2023-02-27 PROCEDURE — 7100000001 HC PACU RECOVERY - ADDTL 15 MIN: Performed by: UROLOGY

## 2023-02-27 PROCEDURE — 82248 BILIRUBIN DIRECT: CPT

## 2023-02-27 PROCEDURE — 2709999900 HC NON-CHARGEABLE SUPPLY: Performed by: UROLOGY

## 2023-02-27 PROCEDURE — 1200000003 HC TELEMETRY R&B

## 2023-02-27 PROCEDURE — 99233 SBSQ HOSP IP/OBS HIGH 50: CPT

## 2023-02-27 PROCEDURE — 83605 ASSAY OF LACTIC ACID: CPT

## 2023-02-27 PROCEDURE — C2617 STENT, NON-COR, TEM W/O DEL: HCPCS | Performed by: UROLOGY

## 2023-02-27 PROCEDURE — 2500000003 HC RX 250 WO HCPCS: Performed by: NURSE ANESTHETIST, CERTIFIED REGISTERED

## 2023-02-27 PROCEDURE — 0TP98DZ REMOVAL OF INTRALUMINAL DEVICE FROM URETER, VIA NATURAL OR ARTIFICIAL OPENING ENDOSCOPIC: ICD-10-PCS | Performed by: UROLOGY

## 2023-02-27 PROCEDURE — 3700000001 HC ADD 15 MINUTES (ANESTHESIA): Performed by: UROLOGY

## 2023-02-27 PROCEDURE — 0T768DZ DILATION OF RIGHT URETER WITH INTRALUMINAL DEVICE, VIA NATURAL OR ARTIFICIAL OPENING ENDOSCOPIC: ICD-10-PCS | Performed by: UROLOGY

## 2023-02-27 DEVICE — URETERAL STENT
Type: IMPLANTABLE DEVICE | Site: URETER | Status: FUNCTIONAL
Brand: PERCUFLEX™ PLUS

## 2023-02-27 RX ORDER — SODIUM CHLORIDE, SODIUM LACTATE, POTASSIUM CHLORIDE, CALCIUM CHLORIDE 600; 310; 30; 20 MG/100ML; MG/100ML; MG/100ML; MG/100ML
INJECTION, SOLUTION INTRAVENOUS CONTINUOUS
Status: DISCONTINUED | OUTPATIENT
Start: 2023-02-27 | End: 2023-03-01 | Stop reason: HOSPADM

## 2023-02-27 RX ORDER — SODIUM CHLORIDE 9 MG/ML
INJECTION, SOLUTION INTRAVENOUS PRN
Status: CANCELLED | OUTPATIENT
Start: 2023-02-27

## 2023-02-27 RX ORDER — MORPHINE SULFATE 4 MG/ML
4 INJECTION, SOLUTION INTRAMUSCULAR; INTRAVENOUS
Status: DISCONTINUED | OUTPATIENT
Start: 2023-02-27 | End: 2023-03-01 | Stop reason: HOSPADM

## 2023-02-27 RX ORDER — LABETALOL HYDROCHLORIDE 5 MG/ML
10 INJECTION, SOLUTION INTRAVENOUS
Status: CANCELLED | OUTPATIENT
Start: 2023-02-27

## 2023-02-27 RX ORDER — SUCCINYLCHOLINE/SOD CL,ISO/PF 200MG/10ML
SYRINGE (ML) INTRAVENOUS PRN
Status: DISCONTINUED | OUTPATIENT
Start: 2023-02-27 | End: 2023-02-27 | Stop reason: SDUPTHER

## 2023-02-27 RX ORDER — MORPHINE SULFATE 2 MG/ML
2 INJECTION, SOLUTION INTRAMUSCULAR; INTRAVENOUS
Status: DISCONTINUED | OUTPATIENT
Start: 2023-02-27 | End: 2023-03-01 | Stop reason: HOSPADM

## 2023-02-27 RX ORDER — FENTANYL CITRATE 50 UG/ML
50 INJECTION, SOLUTION INTRAMUSCULAR; INTRAVENOUS EVERY 5 MIN PRN
Status: CANCELLED | OUTPATIENT
Start: 2023-02-27

## 2023-02-27 RX ORDER — SODIUM CHLORIDE 0.9 % (FLUSH) 0.9 %
5-40 SYRINGE (ML) INJECTION EVERY 12 HOURS SCHEDULED
Status: CANCELLED | OUTPATIENT
Start: 2023-02-27

## 2023-02-27 RX ORDER — HYDRALAZINE HYDROCHLORIDE 20 MG/ML
10 INJECTION INTRAMUSCULAR; INTRAVENOUS
Status: CANCELLED | OUTPATIENT
Start: 2023-02-27

## 2023-02-27 RX ORDER — FENTANYL CITRATE 50 UG/ML
25 INJECTION, SOLUTION INTRAMUSCULAR; INTRAVENOUS EVERY 5 MIN PRN
Status: CANCELLED | OUTPATIENT
Start: 2023-02-27

## 2023-02-27 RX ORDER — FENTANYL CITRATE 50 UG/ML
INJECTION, SOLUTION INTRAMUSCULAR; INTRAVENOUS PRN
Status: DISCONTINUED | OUTPATIENT
Start: 2023-02-27 | End: 2023-02-27 | Stop reason: SDUPTHER

## 2023-02-27 RX ORDER — FLUCONAZOLE 2 MG/ML
200 INJECTION, SOLUTION INTRAVENOUS EVERY 24 HOURS
Status: COMPLETED | OUTPATIENT
Start: 2023-02-27 | End: 2023-03-01

## 2023-02-27 RX ORDER — PROPOFOL 10 MG/ML
INJECTION, EMULSION INTRAVENOUS PRN
Status: DISCONTINUED | OUTPATIENT
Start: 2023-02-27 | End: 2023-02-27 | Stop reason: SDUPTHER

## 2023-02-27 RX ORDER — SODIUM CHLORIDE 0.9 % (FLUSH) 0.9 %
5-40 SYRINGE (ML) INJECTION PRN
Status: CANCELLED | OUTPATIENT
Start: 2023-02-27

## 2023-02-27 RX ORDER — ONDANSETRON 2 MG/ML
INJECTION INTRAMUSCULAR; INTRAVENOUS PRN
Status: DISCONTINUED | OUTPATIENT
Start: 2023-02-27 | End: 2023-02-27 | Stop reason: SDUPTHER

## 2023-02-27 RX ORDER — DEXAMETHASONE SODIUM PHOSPHATE 10 MG/ML
INJECTION, EMULSION INTRAMUSCULAR; INTRAVENOUS PRN
Status: DISCONTINUED | OUTPATIENT
Start: 2023-02-27 | End: 2023-02-27 | Stop reason: SDUPTHER

## 2023-02-27 RX ADMIN — SODIUM CHLORIDE: 9 INJECTION, SOLUTION INTRAVENOUS at 14:05

## 2023-02-27 RX ADMIN — FENTANYL CITRATE 50 MCG: 50 INJECTION, SOLUTION INTRAMUSCULAR; INTRAVENOUS at 13:43

## 2023-02-27 RX ADMIN — FLUCONAZOLE IN SODIUM CHLORIDE 200 MG: 2 INJECTION, SOLUTION INTRAVENOUS at 10:16

## 2023-02-27 RX ADMIN — PIPERACILLIN AND TAZOBACTAM 3375 MG: 3; .375 INJECTION, POWDER, FOR SOLUTION INTRAVENOUS at 17:45

## 2023-02-27 RX ADMIN — CEFTRIAXONE SODIUM 1000 MG: 1 INJECTION, POWDER, FOR SOLUTION INTRAMUSCULAR; INTRAVENOUS at 05:37

## 2023-02-27 RX ADMIN — FENTANYL CITRATE 50 MCG: 50 INJECTION, SOLUTION INTRAMUSCULAR; INTRAVENOUS at 13:33

## 2023-02-27 RX ADMIN — MORPHINE SULFATE 2 MG: 2 INJECTION, SOLUTION INTRAMUSCULAR; INTRAVENOUS at 11:19

## 2023-02-27 RX ADMIN — Medication 160 MG: at 13:37

## 2023-02-27 RX ADMIN — KETOROLAC TROMETHAMINE 15 MG: 30 INJECTION, SOLUTION INTRAMUSCULAR; INTRAVENOUS at 02:35

## 2023-02-27 RX ADMIN — ONDANSETRON 8 MG: 2 INJECTION INTRAMUSCULAR; INTRAVENOUS at 13:46

## 2023-02-27 RX ADMIN — PROPOFOL 150 MG: 10 INJECTION, EMULSION INTRAVENOUS at 13:37

## 2023-02-27 RX ADMIN — SODIUM CHLORIDE, POTASSIUM CHLORIDE, SODIUM LACTATE AND CALCIUM CHLORIDE: 600; 310; 30; 20 INJECTION, SOLUTION INTRAVENOUS at 08:55

## 2023-02-27 RX ADMIN — SODIUM CHLORIDE, POTASSIUM CHLORIDE, SODIUM LACTATE AND CALCIUM CHLORIDE: 600; 310; 30; 20 INJECTION, SOLUTION INTRAVENOUS at 21:39

## 2023-02-27 RX ADMIN — Medication 80 MG: at 13:36

## 2023-02-27 RX ADMIN — SODIUM CHLORIDE, PRESERVATIVE FREE 10 ML: 5 INJECTION INTRAVENOUS at 08:49

## 2023-02-27 RX ADMIN — PIPERACILLIN AND TAZOBACTAM 4500 MG: 4; .5 INJECTION, POWDER, FOR SOLUTION INTRAVENOUS at 11:18

## 2023-02-27 RX ADMIN — MORPHINE SULFATE 2 MG: 2 INJECTION, SOLUTION INTRAMUSCULAR; INTRAVENOUS at 08:52

## 2023-02-27 RX ADMIN — SODIUM CHLORIDE, PRESERVATIVE FREE 10 ML: 5 INJECTION INTRAVENOUS at 21:38

## 2023-02-27 RX ADMIN — ONDANSETRON 4 MG: 2 INJECTION INTRAMUSCULAR; INTRAVENOUS at 02:31

## 2023-02-27 RX ADMIN — DEXAMETHASONE SODIUM PHOSPHATE 10 MG: 10 INJECTION, EMULSION INTRAMUSCULAR; INTRAVENOUS at 13:43

## 2023-02-27 RX ADMIN — SODIUM CHLORIDE: 9 INJECTION, SOLUTION INTRAVENOUS at 12:52

## 2023-02-27 RX ADMIN — ONDANSETRON 4 MG: 2 INJECTION INTRAMUSCULAR; INTRAVENOUS at 08:49

## 2023-02-27 ASSESSMENT — PAIN - FUNCTIONAL ASSESSMENT: PAIN_FUNCTIONAL_ASSESSMENT: PREVENTS OR INTERFERES SOME ACTIVE ACTIVITIES AND ADLS

## 2023-02-27 ASSESSMENT — PAIN DESCRIPTION - LOCATION: LOCATION: ABDOMEN

## 2023-02-27 ASSESSMENT — PAIN DESCRIPTION - ONSET: ONSET: ON-GOING

## 2023-02-27 ASSESSMENT — PAIN SCALES - GENERAL
PAINLEVEL_OUTOF10: 0
PAINLEVEL_OUTOF10: 4
PAINLEVEL_OUTOF10: 0
PAINLEVEL_OUTOF10: 0
PAINLEVEL_OUTOF10: 10

## 2023-02-27 ASSESSMENT — PAIN DESCRIPTION - FREQUENCY: FREQUENCY: CONTINUOUS

## 2023-02-27 ASSESSMENT — PAIN DESCRIPTION - ORIENTATION: ORIENTATION: RIGHT;LEFT;LOWER

## 2023-02-27 ASSESSMENT — PAIN DESCRIPTION - DESCRIPTORS: DESCRIPTORS: ACHING;BURNING

## 2023-02-27 ASSESSMENT — PAIN DESCRIPTION - PAIN TYPE: TYPE: ACUTE PAIN

## 2023-02-27 NOTE — PROGRESS NOTES
Patient stated she was in pain, vomited and the pain went away. Patient received Zofran and Toradol for vomiting and pain. Patient then insisted on eating juice and crackers. Patient complained of pain and began vomiting again. Zofran and Toradol given a second time. Patient was drinking water and had crackers at bedside before this nurse left patient room. Education provided, patient appears to need reinforcement.

## 2023-02-27 NOTE — CONSULTS
CONSULTATION NOTE :ID       Patient - Kristen Tilley,  Age - 23 y. o.    - 2000      Room Number - 8B-21/021-A   MRN -  [id number]   Acct # - [de-identified]  Date of Admission -  2023  5:33 AM  Patient's PCP: DARI Gutierres - CNP     Requesting Physician: DARI Whatley - *    REASON FOR CONSULTATION     \"Right pyelonephritis , has stone, stent in place, having fevers, abx recs. Plan for stent exchange today or hermes. \"    CHIEF COMPLAINT     \"Nausea vomiting abdominal pain. \"     HISTORY OF PRESENT ILLNESS       This is a very pleasant 23 y.o. female who was admitted with the aforementioned chief complaint. Infectious disease was consulted for the aforementioned reason. On my visit, pt reports no fever, chills, diaphoresis, suprapubic tenderness, or flank pain prior to arrival to the hospital. Her main issue was nausea and vomiting which, she reports, caused her intermittent abdominal pain. Pt has h/o ADHD and learning difficulties. She was previously admitted in 2023 for obstructive nephrolithiasis complicated by pyelonephritis. Culture grew contaminants. No h/o ESBL. Pm arrival, pt was diagnosed with sepsis 2/2 pyelonephritis. She was empirically started on Zosyn but was subsequently de-escalated to CTX. IV Ketorolac is being used for pain management. Since arrival, pt continues to have fevers (Tmax 102 in last 24 hours) however leukocytosis is improving.      PAST MEDICAL  HISTORY       Past Medical History:   Diagnosis Date    ADHD (attention deficit hyperactivity disorder)     Learning difficulty     pt states only able to read at 2nd grade level    Premature baby     27 weeks       PAST SURGICAL HISTORY     Past Surgical History:   Procedure Laterality Date    ACHILLES TENDON SURGERY      CHOLECYSTECTOMY, LAPAROSCOPIC N/A 2017    LAPAROSCOPY; OPEN CHOLECYSTECTOMY performed by Mejia Brewer MD at [address]      revision  shunt 1999    URETER SURGERY Right 1/26/2023    Cystoscopy Right Stent Insertion performed by Ramy Silva MD at [address]:       Scheduled Meds:   fluconazole  200 mg IntraVENous Q24H    sodium chloride flush  5-40 mL IntraVENous 2 times per day    enoxaparin  40 mg SubCUTAneous Daily    cefTRIAXone (ROCEPHIN) IV  1,000 mg IntraVENous Q24H     Continuous Infusions:   sodium chloride       PRN Meds:morphine **OR** morphine, sodium chloride flush, sodium chloride, ondansetron **OR** ondansetron, acetaminophen **OR** acetaminophen, polyethylene glycol, magnesium hydroxide  Allergies:   ALLERGIES:    Patient has no known allergies. SOCIAL HISTORY:     TOBACCO:   reports that she has never smoked. She has never used smokeless tobacco.     ETOH:   reports current alcohol use. Patient currently lives with family: mother      FAMILY HISTORY:         Problem Relation Age of Onset    Cancer Mother     COPD Father     Asthma Brother     COPD Brother        REVIEW OF SYSTEMS:     As above    PHYSICAL EXAM:     BP (!) 101/54   Pulse (!) 108   Temp 99.9 °F (37.7 °C) (Oral)   Resp 16   Ht 5' 2\" (1.575 m)   Wt 197 lb 1.6 oz (89.4 kg)   SpO2 94%   BMI 36.05 kg/m²   General apperance:  Awake, alert, not in distress. HEENT: pink conjunctiva, unicteric sclera, moist oral mucosa. Chest:  CTAB, no wheezes / rales / rhonchi. Good bilateral air movement  Cardiovascular:  RRR ,S1S2, no murmur or gallop. Abdomen: Exquisite R CVA tenderness and suprapubic tenderness  Extremities: well appearing. Skin:  Warm and dry. CNS: Aox4, conversing well.          LABS:     CBC:   Recent Labs     02/26/23  0615 02/27/23  0558   WBC 32.4* 17.8*   HGB 13.7 11.5*    187     BMP:    Recent Labs     02/26/23  0615 02/27/23  0558   * 137   K 4.0 3.7   CL 94* 102   CO2 21* 26   BUN 19 14   CREATININE 1.5* 1.2   GLUCOSE 167* 107     Calcium:  Recent Labs     02/27/23  0558 CALCIUM 8.0*     Ionized Calcium:No results for input(s): IONCA in the last 72 hours. Magnesium:  Recent Labs     02/26/23  0615   MG 1.4*     Phosphorus:No results for input(s): PHOS in the last 72 hours. BNP:No results for input(s): BNP in the last 72 hours. Glucose:No results for input(s): POCGLU in the last 72 hours. HgbA1C: No results for input(s): LABA1C in the last 72 hours. INR: No results for input(s): INR in the last 72 hours. Hepatic:   Recent Labs     02/27/23  0558   ALKPHOS 102   ALT 24   AST 14   PROT 5.4*   BILITOT 1.1   BILIDIR 0.7*   LABALBU 2.8*     Amylase and Lipase:  Recent Labs     02/27/23  0558   LACTA 1.0     Lactic Acid:   Recent Labs     02/27/23  0558   LACTA 1.0      Lipids:   Recent Labs     02/27/23  0558   CHOL 101   TRIG 85   HDL 39   LDLCALC 45          UA:   Recent Labs     02/26/23  0700   PHUR 5.5   COLORU DK YELLOW*   MUCUS NONE SEEN   PROTEINU 300*   BLOODU LARGE*   RBCUA 3-5   WBCUA > 200   BACTERIA MODERATE   NITRU POSITIVE*   GLUCOSEU NEGATIVE   BILIRUBINUR SMALL*   UROBILINOGEN 1.0   KETUA TRACE*         IMAGING:    Micro:   Lab Results   Component Value Date/Time    BC No growth 24 hours. 02/26/2023 06:19 AM       Problem list of patient      Patient Active Problem List   Diagnosis Code    Abdominal pain R10.9    Nephrolithiasis N20.0    Obstruction of right ureter N13.5    Septicemia ([address] A41.9    Pyelonephritis N12    Tachycardia R00.0    Hypotension due to hypovolemia I95.89, E86.1    Diarrhea R19.7    Hypomagnesemia E83.42    Hyponatremia A68.1    Complicated UTI (urinary tract infection) N39.0           Impression and Recommendation:     #Sepsis 2/2 Recurrent R-sided Pyelonephritis (PoA): Active  H/o R-sided obstructive nephrolithiasis s/p stent 01/2023. Presented with sepsis. CTA AP with hydronephrosis but no visualized obstructive nephrolithiasis. Exam c/w pyelonephritis.    Plan:   -Cultures    +UCx in 01/2023 with contaminants    +UCx 02/26/23 with CODY >100,000 CFU/mL  -Antibiotics; will adjust pending cultures    +IV CTX (02/27/23-now)    +IV Zosyn (02/26/23)  -Continue IV CTX for now  -Urology on board. Planning exchange of stent    #ADHD / Learning Difficulties   #2-cm well-circumscribed R breast mass     Infection Control:     Standard    Discharge Planning (Coordination of out patient care: Thank you DARI Issa - * for allowing me to participate in this patient's care. Signed:  Arlette Ferrari MD  Internal Medicine, PGY-2  02/27/23  8:14 AM  Patient was seen and examined face-to-face by me  The chart, progress notes, labs and radiographs were reviewed. Case discussed with the nurse. Questions and concerns were addressed. I agree with the progress note.

## 2023-02-27 NOTE — ED PROVIDER NOTES
[address]    Pt Name: Camille Fay  MRN: [id number]  [place] 2000  Date of evaluation: 9/12/20      I personally saw and examined the patient. I have reviewed and agree with the Resident findings, including all diagnostic interpretations and treatment plans as written. I was present for the key portion of any procedures performed and the inclusive time noted in any critical care statement. History: This patient was seen with Martin Castillo, resident physician    This is a 78-year-old female who presented with symptoms suggestive of renal colic on the right side. She still has a stent in. Has had multiple UTIs in the past.  We suspected that she may have sepsis as a result. She notably had a heart rate of 140 on arrival.  Did not have a fever. She had a borderline pulse oximetry as well so we wanted to rule her out for pulmonary embolism. CTA of the chest is negative for pulmonary emboli with faint interstitial opacities bilaterally present previously. Breast mass noted. Abdomen with suggestion of pyelonephritis of the right kidney. Patient is noted to have white count of 32.4. IV antibiotics initiated. Patient will be admitted.            DO Isis Faustin DO  02/26/23 2818

## 2023-02-27 NOTE — OP NOTE
FACILITY:  [facility].Ana Lilia PETERSEN  2000  [id number]    DATE: 02/27/23  SURGEON:  Dr. Jalyn James MD , MD  ASSISTANT: Dr. Jalyn James MD MD  PREOPERATIVE DIAGNOSIS: Right ureteral obstruction   POSTOPERATIVE DIAGNOSIS: Same  PROCEDURES PERFORMED:  1. Cystoscopy. 2. Right sided stent exchange. DRAINS: Right 6x26 cm cm double-J ureteral stent  SPECIMEN: none  ANESTHESIA: MAC  ESTIMATED BLOOD LOSS: None. COMPLICATIONS: None. INDICATIONS FOR PROCEDURE:  Suzanne Zaragoza is a 23 y.o. female presents for Right ureteral stent exchange for fevers. After the risks, benefits, alternatives, of the procedure were discussed with the patient, informed consent was obtained. The patient elected to proceed. DETAILS OF THE PROCEDURE:  The patient was brought back to the preoperative holding area to the operating suite, and was transferred to the operating table where the patient lay in supine position. General endotracheal anesthesia was induced, and patient was prepped and draped in standard surgical fashion after being placed in dorsolithotomy position. A proper timeout was performed, preoperative antibiotics were given. A rigid cystoscope with a 22 Yoruba sheath with 30 degree lens was inserted in the patient's urethral and into the bladder with ease. We then focused our attention on the Right ureteral orifice, we grasped the stent and pulled to the meatus, which we cannulated with our glidewire. Over our glidewire we placed a 6x26* cm double-J ureteral stent and we noted appropriate placement in the upper collecting system using fluoroscopy. We then removed our wire. There was good proximal and distal curl. We then drained the patient's bladder and removed the scope and the procedure was subsequently terminated. Plan:   The patient will be discharged home in good condition.  The patient will need to follow up for definitive stone management: Dr. Louise De Luna

## 2023-02-27 NOTE — PLAN OF CARE
Problem: Pain  Goal: Verbalizes/displays adequate comfort level or baseline comfort level  2/26/2023 2158 by Samson Harris RN  Outcome: Not Progressing  2/26/2023 1118 by Radha Mckeon RN  Outcome: Progressing  Flowsheets (Taken 2/26/2023 1118)  Verbalizes/displays adequate comfort level or baseline comfort level:   Encourage patient to monitor pain and request assistance   Assess pain using appropriate pain scale     Problem: Gastrointestinal - Adult  Goal: Minimal or absence of nausea and vomiting  2/26/2023 2158 by Samson Harris RN  Outcome: Not Progressing  2/26/2023 1118 by Radha Mckeon RN  Outcome: Progressing  Flowsheets (Taken 2/26/2023 1118)  Minimal or absence of nausea and vomiting:   Administer IV fluids as ordered to ensure adequate hydration   Administer ordered antiemetic medications as needed   Advance diet as tolerated, if ordered     Problem: Pain  Goal: Verbalizes/displays adequate comfort level or baseline comfort level  2/26/2023 2158 by Samson Harris RN  Outcome: Not Progressing  2/26/2023 1118 by Radha Mckeon RN  Outcome: Progressing  Flowsheets (Taken 2/26/2023 1118)  Verbalizes/displays adequate comfort level or baseline comfort level:   Encourage patient to monitor pain and request assistance   Assess pain using appropriate pain scale     Problem: Gastrointestinal - Adult  Goal: Minimal or absence of nausea and vomiting  2/26/2023 2158 by Samson Harris RN  Outcome: Not Progressing  2/26/2023 1118 by Radha Mckeon RN  Outcome: Progressing  Flowsheets (Taken 2/26/2023 1118)  Minimal or absence of nausea and vomiting:   Administer IV fluids as ordered to ensure adequate hydration   Administer ordered antiemetic medications as needed   Advance diet as tolerated, if ordered

## 2023-02-27 NOTE — CONSULTS
[facility]  [address] [facility] [address]  Dept: [phone]  Loc: [phone]  Visit Date: 2/26/2023    Urology Consult Note    Reason for Consult:  Sepsis, Urinary tract infection, right indwelling ureteral stent  Requesting Physician:  medicine    History Obtained From:  patient, electronic medical record    Chief Complaint: UTI, diffuse abdominal pain, flank pain, history of kidney stones and stent    HISTORY OF PRESENT ILLNESS:                The patient is a 22 y.o. female with significant past medical history of  kidney stones with right ureteral stent, ADHD and learning difficulty who was admitted to [facility] after presenting to the ED with nausea vomiting abdominal pain.  Patient was previously admitted in January 2023 after she was found to have an obstructing stone and pyelonephritis for which she was treated with antibiotics and underwent stent placement with Dr. Lucas.  She was doing fine until yesterday developed acute abdominal pain with nausea and some vomiting, nonbloody.  In the ED patient met SIRS criteria and was found to have a UTI as well as possible right pyelonephritis, was admitted for further management and care.  S/p sepsis fluids, initiated on empiric antibiotics with Rocephin.  Urologic hx   Onset 1/26/2023  Severity is described as right ureteral stone 3 mm, had stent placed  Scheduled for Cystoscopy Right Ureteroscopy possible Holmium Laser Lithotripsy possible Right Stent Removal hermes    Past Medical History:        Diagnosis Date    ADHD (attention deficit hyperactivity disorder)     Learning difficulty     pt states only able to read at 2nd grade level    Premature baby     27 weeks     Past Surgical History:        Procedure Laterality Date    ACHILLES TENDON SURGERY      CHOLECYSTECTOMY, LAPAROSCOPIC N/A 7/16/2017    LAPAROSCOPY; OPEN CHOLECYSTECTOMY performed by Zaynab Taylor MD at [facility] OR    OTHER SURGICAL HISTORY       revision  shunt 1999    URETER SURGERY Right 1/26/2023    Cystoscopy Right Stent Insertion performed by Devin Jackson MD at [facility]:  Patient has no known allergies. Social History:  Social History     Socioeconomic History    Marital status: Single     Spouse name: Not on file    Number of children: Not on file    Years of education: Not on file    Highest education level: Not on file   Occupational History    Not on file   Tobacco Use    Smoking status: Never    Smokeless tobacco: Never   Vaping Use    Vaping Use: Every day    Substances: Flavoring    Devices: Refillable tank   Substance and Sexual Activity    Alcohol use: Yes     Comment: occasionally    Drug use: No    Sexual activity: Yes     Partners: Male   Other Topics Concern    Not on file   Social History Narrative    Not on file     Social Determinants of Health     Financial Resource Strain: Not on file   Food Insecurity: Not on file   Transportation Needs: Not on file   Physical Activity: Not on file   Stress: Not on file   Social Connections: Not on file   Intimate Partner Violence: Not on file   Housing Stability: Not on file     Family History:       Problem Relation Age of Onset    Cancer Mother     COPD Father     Asthma Brother     COPD Brother        ROS:  Constitutional: ++fevers  Eyes: Denies reported visual changes. ENT: Denies headache, difficulty swallowing, earache, and nosebleeds. Cardiovascular: Negative for chest pain, palpitations, tachycardia or edema. Respiratory: Denies cough or SOB. GI:The patient denies abdominal or flank pain, anorexia, nausea or vomiting. : see HPI. Musculoskeletal: Patient denies low back pain or painful or reduced range of ROM. Neurological: The patient denies any symptoms of neurological impairment or TIA. Psychiatric: Denies anxiety or depression. Skin: Denies rash or lesions. All remaining ROS negative.     PHYSICAL EXAM:  VITALS:  BP (!) 101/54 Pulse (!) 108   Temp 99.9 °F (37.7 °C) (Oral)   Resp 16   Ht 5' 2\" (1.575 m)   Wt 197 lb 1.6 oz (89.4 kg)   SpO2 94%   BMI 36.05 kg/m² . Nursing note and vitals reviewed. Constitutional: Alert and oriented times x3, appears distressed, and cooperative to examination with appropriate mood and affect. HEENT:   Head:         Normocephalic and atraumatic. Mouth/Throat:          Mucous membranes are normal.   Eyes:         EOM are normal. No scleral icterus. Nose:    The external appearance of the nose is normal  Ears: The ears appear normal to external inspection. Cardiovascular:       Normal rate, regular rhythm. Pulmonary/Chest:  Normal respiratory rate and rhthym. No use of accessory muscles. Lungs clear bilaterally. Abdominal:          Soft. ++tenderness. Genitalia:    Voiding spontaneously   Musculoskeletal:    Normal range of motion. He exhibits no edema or tenderness of lower extremities. Extremities:    No cyanosis, clubbing, or edema present. Neurological:    Alert and oriented.      DATA:  CBC:   Lab Results   Component Value Date/Time    WBC 17.8 02/27/2023 05:58 AM    RBC 3.97 02/27/2023 05:58 AM    HGB 11.5 02/27/2023 05:58 AM    HCT 36.3 02/27/2023 05:58 AM    MCV 91.4 02/27/2023 05:58 AM    MCH 29.0 02/27/2023 05:58 AM    MCHC 31.7 02/27/2023 05:58 AM    RDW 13.8 07/18/2017 09:10 AM     02/27/2023 05:58 AM    MPV 11.8 02/27/2023 05:58 AM     BMP:    Lab Results   Component Value Date/Time     02/27/2023 05:58 AM    K 3.7 02/27/2023 05:58 AM     02/27/2023 05:58 AM    CO2 26 02/27/2023 05:58 AM    BUN 14 02/27/2023 05:58 AM    CREATININE 1.2 02/27/2023 05:58 AM    CALCIUM 8.0 02/27/2023 05:58 AM    LABGLOM >60 02/27/2023 05:58 AM    GLUCOSE 107 02/27/2023 05:58 AM     BUN/Creatinine:    Lab Results   Component Value Date/Time    BUN 14 02/27/2023 05:58 AM    CREATININE 1.2 02/27/2023 05:58 AM     Magnesium:    Lab Results   Component Value Date/Time MG 1.4 02/26/2023 06:15 AM     Phosphorus:    Lab Results   Component Value Date/Time    PHOS 2.4 01/31/2023 05:31 AM     PT/INR:    Lab Results   Component Value Date/Time    INR 1.33 01/27/2023 01:14 PM     U/A:    Lab Results   Component Value Date/Time    COLORU DK YELLOW 02/26/2023 07:00 AM    PHUR 5.5 02/26/2023 07:00 AM    LABCAST NONE SEEN 01/26/2023 08:55 AM    LABCAST NONE SEEN 01/26/2023 08:55 AM    WBCUA > 200 02/26/2023 07:00 AM    RBCUA 3-5 02/26/2023 07:00 AM    MUCUS NONE SEEN 02/26/2023 07:00 AM    YEAST FEW BUDDING 02/26/2023 07:00 AM    BACTERIA MODERATE 02/26/2023 07:00 AM    SPECGRAV 1.009 01/26/2023 08:55 AM    LEUKOCYTESUR LARGE 02/26/2023 07:00 AM    UROBILINOGEN 1.0 02/26/2023 07:00 AM    BILIRUBINUR SMALL 02/26/2023 07:00 AM    BLOODU LARGE 02/26/2023 07:00 AM    GLUCOSEU NEGATIVE 02/26/2023 07:00 AM    AMORPHOUS NONE SEEN 02/26/2023 07:00 AM       Imaging: The patient has had a CT Without and With Contrast which I have independently reviewed along with its accompanying report. The study demonstrates   FINDINGS:           The visualized aspects of the lung bases are clear. The base of the heart is within appropriate limits. The liver is normal. The spleen is normal. The adrenals and pancreas are normal. There are surgical clips were prior cholecystectomy. Tubing from a  shunt is seen. The tip is along the surface of the liver. There is no associated fluid collection. The right kidney is significantly larger than the left. This appears slightly larger than on the prior exam. There is decreased enhancement of the cortex of the right kidney compared to the left. There is blurring of the corticomedullary junction. There    is a hazy appearance to the fat in the hilum. There is a right ureteral stent in place. This appears stable in position. The left kidney appears normal.       No abnormalities of the small bowel loops are noted.   The IVC and aorta are of normal caliber. There is no adenopathy. The urinary bladder is relatively empty. There is no pelvic free fluid. The colon is within normal limits. There is a normal appendix in the right lower quadrant. The uterus and adnexa appear normal. No suspicious osseous lesions are identified. Impression       1. Abnormal appearing right kidney. This appears large and there is decreased enhancement of the right kidney compared to the left. These findings are suggestive of pyelonephritis. Other etiologies are not excluded. An infiltrating process is also a    consideration. 2. Normal-appearing right ureteral stent. YARELIS   PROCEDURE: US LIVER, US RENAL COMPLETE       CLINICAL INFORMATION: 64-year-old female with right upper quadrant abdominal pain. Right-sided ureteral stent. COMPARISON: Correlation is made with CT scan 2/26/2023 7:32 AM       TECHNIQUE: Multiplanar grayscale and color flow sonographic images were obtained of the upper abdomen including the liver and bilateral kidneys. FINDINGS:   RIGHT KIDNEY - 10.20 x 6.587 x 7.73 cm   Resistive Index - 0.68   Cortical Thickness - 1.53 cm       LEFT KIDNEY - 11.94 x 5.68 x 5.63 cm   Resistive Index - 0.64   Cortical Thickness - 1.53 cm       URINARY BLADDER   Pre-Void - 91 mL   Post-Void - / mL       Liver- 15.87 cm   CBD- 1.11 cm       The liver is of normal echogenicity. There are no liver masses. There is no intrahepatic biliary ductal dilatation. There is normal color flow in the main portal vein, right and left portal veins, hepatic artery, inferior vena cava and all 3 hepatic veins. The pancreatic head and body are within normal limits. The tail is not well seen due to overlying bowel gas. The gallbladder is surgically absent. The common bile duct is prominent measuring 1.1 cm. This is likely compensatory in nature post cholecystectomy. There is normal echogenicity of the renal parenchyma bilaterally. There is no thinning of the renal cortex. There is mild prominence of the right renal pelvis without sarthak hydronephrosis. No stones are noted. No mass lesions are noted. The urinary bladder is incompletely distended. No gross abnormalities are evident. There is a right-sided ureteral stent extending into the bladder. The left ureteral jet is visualized. Impression   No suspicious findings. IMPRESSION/Plan:    NPO  Consult ID  Consent   Abx  Added MS for pain control      Right ureteral stone - scheduled for right works hermes in [place], will cancel and exchange stent today or hermes  Pyonephritis  - edematous right kidney, right stent in place, no hydro. On Rocephin, will add Diflucan    Cystoscopy, right ureteral stent exchange per Dr Mary Anne Goodman today. I described the procedure in detail and also described the associated risks and benefits at length. We discussed possible alternative therapies. We discussed the risks and benefits of not undergoing therapy. Patient understands these risks and benefits and desires to proceed. Post-op expectations were discussed; stent pain, urinary frequency and urgency secondary to the stent, dysuria which should improve 1-2 days after procedure, and intermittent hematuria can be expected as long as stent is in place.      Thank you for including us in the care of [address] [place] [address], DARI - CNP, DARI  02/27/23 7:32 AM  Urology

## 2023-02-27 NOTE — PROGRESS NOTES
Pharmacy Note - Extended Infusion Beta-Lactam Adjustment    Piperacillin/Tazobactam 3375mg Q8h for treatment of Urinary tract infection. Per [address] Extended Infusion Beta-Lactam Policy, piperacillin/tazobactam will be changed to 4500mg loading dose followed by 3375mg Q8h extended infusion        Please call with any questions.     Thank you,    Everette Barlow, [facility]

## 2023-02-27 NOTE — PROGRESS NOTES
Hospitalist Progress Note    Patient:  Vinnie Patel      Unit/Bed:8B-21/021-A    YOB: 2000    MRN: [id number]       Acct: [de-identified]     PCP: DARI Fulton CNP    Date of Admission: 2/26/2023    Assessment/Plan:    Sepsis (POA) due to pyelonephritis  4/4 SIRS criteria (POA) (leukocytosis 32, tachycardia, fever 102, tachyphemic). Hypotensive, qSOFA 2/3. Lactic acid 1.7 (POA). Did have signs of organ failure with SAM, Cr 1.5  S/p 30 cc/kg IVF resuscitation. BC x 2 preliminary result NGTD  Urine cx, see below  Continue IV abx in #2  CTA chest negative for PE, showing faint interstitial opacities throughout both lungs, as noted on previous CTA chest dated 1/27/2023. Could represent infiltrates or infiltrating process such as pulmonary edema. Complicated UTI, suspected pyelonephritis:  CT A/P showing abnormal appearing right kidney, appears larger and there is decreased enhancement of the right kidney compared to the left. These findings are suggestive of pyelonephritis. Other etiologies are not excluded, and infiltrating process is also a consideration. Normal-appearing right ureteral stent. Renal ultrasound negative for suspicious findings. She consulted, planning cystoscopy with right ureteral stent exchange per Dr. Lizabeth Lindsay today  SAM (POA)  Secondary to volume depletion from sepsis and renal hypoperfusion from hypotension  Creatinine 1.2, down from 1.5 (POA)  Continuous IVF for hydration  Avoid nephrotoxic agents, renally dose medications  Daily BMP. AGMA, resolved  Secondary to #1  Resolved S/p IVF  2 cm right breast mass  Noted on CTA chest.  Recommend elective targeted right breast ultrasound. Hypomagnesemia  Magnesemia replacement protocol. Repeat Magnesium level in AM.  Direct hyperbilirubinemia  Bilirubin 0.7, down from 1 (POA)  Liver ultrasound noting CBD prominent measuring 1.1 cm however likely compensatory in nature postcholecystectomy. ,  No suspicious findings. Daily CMP  Hypoosmolar hypochloremia hyponatremia  Secondary to volume depletion from #1  S/p 30 cc/kg IVF bolus  Continuous IVF  Daily lab. Hypotension  Secondary to #1. S/p sepsis IV fluid boluses. Continuous IV fluids for hydration. Monitor BP  Sinus tachycardia  Secondary to #1  S/p IVF boluses. Continuous IVF's  Tele  Hx of hydrocephalus s/p  shunt:   Noted per chart review, sequelae of prematurity. Hx of premature birth, developmentally delayed  Chronic urinary incontinence:   Patient noted to be incontinent of urine multiple times on admission. Per patient's mother reports patient is always incontinent. Obesity obesity:   BMI 36.05 kg per metered squared. Discussed and educated on lifestyle modifications          Expected discharge date:  Pending clinical course    Disposition:    [x] Home       [] TCU       [] Rehab       [] Psych       [] SNF       [] [facility]       [] Other-    Chief Complaint: UTI, diffuse abdominal pain, flank pain, history of kidney stones and stent    Hospital Course: Per HPI documented 2/26/2023: \"Patient is a 23-year-old female past medical history of kidney stones with right ureteral stent, ADHD and learning difficulty who was admitted to [address] after presenting to the ED with nausea vomiting abdominal pain. Patient was previously admitted in January 2023 after she was found to have an obstructing stone and pyelonephritis for which she was treated with antibiotics and underwent stent placement with Dr. Axel Sanders. She was doing fine until yesterday developed acute abdominal pain with nausea and some vomiting, nonbloody. In the ED patient met SIRS criteria and was found to have a UTI as well as possible right pyelonephritis, was admitted for further management and care. S/p sepsis fluids, initiated on empiric antibiotics with Rocephin. \"    2/27/2023: Resume care patient today.   Patient resting in bed, nontoxic in appearance, no apparent distress. Afebrile, satting 97% on room air, hemodynamically stable. Patient stating that she is having some 3/10 abdominal pain after receiving morphine but states that it was a 10/10. States that she has some nausea, has had dry heaving. States she feels hot. Patient evaluated by urology services who is planning for stent exchange today. Continue IV antibiotics at this time, urine culture growing enteric gram-negative bacilli. Continue current IV antibiotics, tailor pending sensitivity results. ID consulted by admitting provider, appreciate recs    Subjective (past 24 hours):      Denies chest pains, shortness of breath at rest, dyspnea on exertion, vomiting, diarrhea, fever, chills. Medications:  Reviewed    Infusion Medications    sodium chloride       Scheduled Medications    fluconazole  200 mg IntraVENous Q24H    sodium chloride flush  5-40 mL IntraVENous 2 times per day    enoxaparin  40 mg SubCUTAneous Daily    cefTRIAXone (ROCEPHIN) IV  1,000 mg IntraVENous Q24H     PRN Meds: morphine **OR** morphine, sodium chloride flush, sodium chloride, ondansetron **OR** ondansetron, acetaminophen **OR** acetaminophen, polyethylene glycol, magnesium hydroxide      Intake/Output Summary (Last 24 hours) at 2/27/2023 0817  Last data filed at 2/27/2023 6914  Gross per 24 hour   Intake 1082.13 ml   Output --   Net 1082.13 ml       Diet:  Diet NPO    Exam:  BP (!) 101/54   Pulse (!) 108   Temp 99.9 °F (37.7 °C) (Oral)   Resp 16   Ht 5' 2\" (1.575 m)   Wt 197 lb 1.6 oz (89.4 kg)   SpO2 94%   BMI 36.05 kg/m²     General appearance: No apparent distress, appears stated age and cooperative. Obese. HEENT: Pupils equal, round, and reactive to light. Conjunctivae/corneas clear. Neck: Supple, with full range of motion. No jugular venous distention. Trachea midline. Respiratory:  Normal respiratory effort. Able to speak full clear sentences.   Clear to auscultation, bilaterally without Rales/Wheezes/Rhonchi. Cardiovascular: Regular rate and rhythm with normal S1/S2 without murmurs, rubs or gallops. Abdomen: Soft, non-tender, non-distended with normal bowel sounds. Musculoskeletal: passive and active ROM x 4 extremities. Skin: Skin color, texture, turgor normal.  No rashes or lesions. Neurologic:  Neurovascularly intact without any focal sensory/motor deficits. Cranial nerves: II-XII intact, grossly non-focal.  Psychiatric: Alert and oriented, thought content appropriate, normal insight  Capillary Refill: Brisk,< 3 seconds   Peripheral Pulses: +2 palpable, equal bilaterally       Labs:   Recent Labs     02/26/23  0615 02/27/23  0558   WBC 32.4* 17.8*   HGB 13.7 11.5*   HCT 41.0 36.3*    187     Recent Labs     02/26/23 0615 02/27/23  0558   * 137   K 4.0 3.7   CL 94* 102   CO2 21* 26   BUN 19 14   CREATININE 1.5* 1.2   CALCIUM 9.1 8.0*     Recent Labs     02/26/23  0615 02/27/23  0558   AST 35 14   ALT 44 24   BILIDIR 1.0* 0.7*   BILITOT 1.6* 1.1   ALKPHOS 129* 102     No results for input(s): INR in the last 72 hours. No results for input(s): Coit Agustin in the last 72 hours. Microbiology:      Urinalysis:      Lab Results   Component Value Date/Time    NITRU POSITIVE 02/26/2023 07:00 AM    WBCUA > 200 02/26/2023 07:00 AM    BACTERIA MODERATE 02/26/2023 07:00 AM    RBCUA 3-5 02/26/2023 07:00 AM    BLOODU LARGE 02/26/2023 07:00 AM    SPECGRAV 1.009 01/26/2023 08:55 AM    GLUCOSEU NEGATIVE 02/26/2023 07:00 AM       Radiology:  CTA CHEST W WO CONTRAST    Result Date: 2/26/2023  PROCEDURE: CTA CHEST W WO CONTRAST CLINICAL INFORMATION: Chest pain, tachycardia, hypoxia, rule out pulmonary embolism. COMPARISON: CT chest 1/27/2023. TECHNIQUE: 3 mm axial images were obtained through the chest after the administration of IV contrast.  A non-contrast localizer was obtained.   3D reconstructions were performed on the scanner to include MIP coronal and sagittal images through the chest. Isovue was the intravenous contrast utilized. All CT scans at this facility use dose modulation, iterative reconstruction, and/or weight-based dosing when appropriate to reduce radiation dose to as low as reasonably achievable. FINDINGS: There is adequate opacification of the pulmonary arterial system. No pulmonary emboli are present. The aorta is within acceptable limits. The heart size is normal. There is no pericardial or pleural effusion. There is no mediastinal, axillary or hilar adenopathy. As seen on the prior chest CT, there are faint areas of groundglass opacities throughout both lungs. Some of these appear interstitial. This is unchanged. This catheter present some inflammation versus mild pulmonary edema. Infection is not excluded. In the medial right breast, there is a 2 cm round lesion. This is seen on image 59. This is indeterminate. In the upper abdomen, there are surgical clips from a cholecystectomy. There is a  shunt in the soft tissues of the left chest wall. There are no suspicious osseous lesions. 1. No pulmonary emboli. 2. Faint interstitial opacities throughout both lungs. These were also present previously. This can represent infiltrates. An infiltrating process or pulmonary edema are also considerations. 3. 2 cm well-circumscribed right breast mass. An elective targeted right breast ultrasound is recommended. **This report has been created using voice recognition software. It may contain minor errors which are inherent in voice recognition technology. ** Final report electronically signed by Dr. Kyle Bloch on 2/26/2023 8:30 AM    US RENAL COMPLETE    Result Date: 2/26/2023  PROCEDURE: US LIVER, US RENAL COMPLETE CLINICAL INFORMATION: 17-year-old female with right upper quadrant abdominal pain. Right-sided ureteral stent.  COMPARISON: Correlation is made with CT scan 2/26/2023 7:32 AM TECHNIQUE: Multiplanar grayscale and color flow sonographic images were obtained of the upper abdomen including the liver and bilateral kidneys. FINDINGS: RIGHT KIDNEY - 10.20 x 6.587 x 7.73 cm Resistive Index - 0.68 Cortical Thickness - 1.53 cm LEFT KIDNEY - 11.94 x 5.68 x 5.63 cm Resistive Index - 0.64 Cortical Thickness - 1.53 cm URINARY BLADDER Pre-Void - 91 mL Post-Void - / mL Liver- 15.87 cm CBD- 1.11 cm The liver is of normal echogenicity. There are no liver masses. There is no intrahepatic biliary ductal dilatation. There is normal color flow in the main portal vein, right and left portal veins, hepatic artery, inferior vena cava and all 3 hepatic veins. The pancreatic head and body are within normal limits. The tail is not well seen due to overlying bowel gas. The gallbladder is surgically absent. The common bile duct is prominent measuring 1.1 cm. This is likely compensatory in nature post cholecystectomy. There is normal echogenicity of the renal parenchyma bilaterally. There is no thinning of the renal cortex. There is mild prominence of the right renal pelvis without sarthak hydronephrosis. No stones are noted. No mass lesions are noted. The urinary bladder is incompletely distended. No gross abnormalities are evident. There is a right-sided ureteral stent extending into the bladder. The left ureteral jet is visualized. No suspicious findings. **This report has been created using voice recognition software. It may contain minor errors which are inherent in voice recognition technology. ** Final report electronically signed by Dr Pearline Apgar on 2023 1:59 PM    CT ABDOMEN PELVIS W IV CONTRAST Additional Contrast? None    Result Date: 2023  PROCEDURE: CT ABDOMEN PELVIS W IV CONTRAST CLINICAL INFORMATION: Hx of right ureter stent . Nausea and vomiting. COMPARISON: CT abdomen pelvis 2023. TECHNIQUE: Axial 5 mm CT images were obtained through the abdomen and pelvis after the administration of intravenous contrast. Coronal and sagittal reconstructions were obtained.  All CT scans at this facility use dose modulation, iterative reconstruction, and/or weight-based dosing when appropriate to reduce radiation dose to as low as reasonably achievable. FINDINGS: The visualized aspects of the lung bases are clear. The base of the heart is within appropriate limits. The liver is normal. The spleen is normal. The adrenals and pancreas are normal. There are surgical clips were prior cholecystectomy. Tubing from a  shunt is seen. The tip is along the surface of the liver. There is no associated fluid collection. The right kidney is significantly larger than the left. This appears slightly larger than on the prior exam. There is decreased enhancement of the cortex of the right kidney compared to the left. There is blurring of the corticomedullary junction. There is a hazy appearance to the fat in the hilum. There is a right ureteral stent in place. This appears stable in position. The left kidney appears normal. No abnormalities of the small bowel loops are noted. The IVC and aorta are of normal caliber. There is no adenopathy. The urinary bladder is relatively empty. There is no pelvic free fluid. The colon is within normal limits. There is a normal appendix in the right lower quadrant. The uterus and adnexa appear normal. No suspicious osseous lesions are identified. 1. Abnormal appearing right kidney. This appears large and there is decreased enhancement of the right kidney compared to the left. These findings are suggestive of pyelonephritis. Other etiologies are not excluded. An infiltrating process is also a consideration. 2. Normal-appearing right ureteral stent. **This report has been created using voice recognition software. It may contain minor errors which are inherent in voice recognition technology. ** Final report electronically signed by Dr. Artur Piper on 2/26/2023 8:37 AM    US LIVER    Result Date: 2/26/2023  PROCEDURE: US LIVER, US RENAL COMPLETE CLINICAL INFORMATION: 78-year-old female with right upper quadrant abdominal pain. Right-sided ureteral stent. COMPARISON: Correlation is made with CT scan 2023 7:32 AM TECHNIQUE: Multiplanar grayscale and color flow sonographic images were obtained of the upper abdomen including the liver and bilateral kidneys. FINDINGS: RIGHT KIDNEY - 10.20 x 6.587 x 7.73 cm Resistive Index - 0.68 Cortical Thickness - 1.53 cm LEFT KIDNEY - 11.94 x 5.68 x 5.63 cm Resistive Index - 0.64 Cortical Thickness - 1.53 cm URINARY BLADDER Pre-Void - 91 mL Post-Void - / mL Liver- 15.87 cm CBD- 1.11 cm The liver is of normal echogenicity. There are no liver masses. There is no intrahepatic biliary ductal dilatation. There is normal color flow in the main portal vein, right and left portal veins, hepatic artery, inferior vena cava and all 3 hepatic veins. The pancreatic head and body are within normal limits. The tail is not well seen due to overlying bowel gas. The gallbladder is surgically absent. The common bile duct is prominent measuring 1.1 cm. This is likely compensatory in nature post cholecystectomy. There is normal echogenicity of the renal parenchyma bilaterally. There is no thinning of the renal cortex. There is mild prominence of the right renal pelvis without sarthak hydronephrosis. No stones are noted. No mass lesions are noted. The urinary bladder is incompletely distended. No gross abnormalities are evident. There is a right-sided ureteral stent extending into the bladder. The left ureteral jet is visualized. No suspicious findings. **This report has been created using voice recognition software. It may contain minor errors which are inherent in voice recognition technology. ** Final report electronically signed by Dr Pearline Apgar on 2023 1:59 PM      DVT prophylaxis: [x] Lovenox                                 [] SCDs                                 [] SQ Heparin                                 [] Encourage ambulation           [] Already on Anticoagulation     Code Status: Full Code    PT/OT Eval Status: No    Tele:   [x] yes             [] no    Active Hospital Problems    Diagnosis Date Noted    Complicated UTI (urinary tract infection) [N39.0] 02/26/2023     Priority: Medium       Electronically signed by DARI Patterson CNP on 2/27/2023 at 8:17 AM

## 2023-02-27 NOTE — ANESTHESIA POSTPROCEDURE EVALUATION
Department of Anesthesiology  Postprocedure Note    Patient: Meliza Olivares  MRN: [id number]  YOB: 2000  Date of evaluation: 2/27/2023      Procedure Summary     Date: 02/27/23 Room / Location: [facility] / [facility]    Anesthesia Start: 1329 Anesthesia Stop: 4973    Procedure: CYSTOSCOPY RIGHT STENT EXCHANGE (Right: Ureter) Diagnosis:       Kidney stone      (Kidney stone [N20.0])    Surgeons: Charla Zaragoza MD Responsible Provider: Shaneka Burns DO    Anesthesia Type: general ASA Status: 2          Anesthesia Type: No value filed.     Ck Phase I: Ck Score: 6    Ck Phase II:        Anesthesia Post Evaluation    Patient location during evaluation: bedside  Patient participation: complete - patient participated  Level of consciousness: awake  Airway patency: patent  Nausea & Vomiting: no vomiting and no nausea  Cardiovascular status: hemodynamically stable  Respiratory status: acceptable  Hydration status: stable

## 2023-02-27 NOTE — ANESTHESIA PRE PROCEDURE
Department of Anesthesiology  Preprocedure Note       Name:  Lora Marks   Age:  23 y. o.  :  2000                                          MRN:  [id number]         Date:  2023      Surgeon: Lu Condon):  Kelechi Owens MD    Procedure: Procedure(s):  CYSTOSCOPY RIGHT STENT EXCHANGE    Medications prior to admission:   Prior to Admission medications    Medication Sig Start Date End Date Taking?  Authorizing Provider   ondansetron (ZOFRAN-ODT) 4 MG disintegrating tablet Take 1 tablet by mouth every 8 hours as needed for Nausea or Vomiting  Patient not taking: No sig reported 23   Marci Stevenson PA-C       Current medications:    Current Facility-Administered Medications   Medication Dose Route Frequency Provider Last Rate Last Admin    fluconazole (DIFLUCAN) in 0.9 % sodium chloride IVPB 200 mg  200 mg IntraVENous Q24H Kip Clines, APRN -  mL/hr at 23 1016 200 mg at 23 1016    morphine (PF) injection 2 mg  2 mg IntraVENous Q2H PRN Kip Clines, APRN - CNP   2 mg at 23 1119    Or    morphine injection 4 mg  4 mg IntraVENous Q2H PRN Kip Clines, APRN - CNP        lactated ringers IV soln infusion   IntraVENous Continuous [place], APRN -  mL/hr at 23 0855 New Bag at 23 0855    piperacillin-tazobactam (ZOSYN) 3,375 mg in sodium chloride 0.9 % 50 mL IVPB (mini-bag)  3,375 mg IntraVENous Q8H [place], APRN - CNP        sodium chloride flush 0.9 % injection 5-40 mL  5-40 mL IntraVENous 2 times per day Kathryn Butler MD   10 mL at 23 0849    sodium chloride flush 0.9 % injection 5-40 mL  5-40 mL IntraVENous PRN Kathryn Butler MD        0.9 % sodium chloride infusion   IntraVENous PRN Kathryn Butler MD        enoxaparin (LOVENOX) injection 40 mg  40 mg SubCUTAneous Daily Kathryn Butler MD   40 mg at 23 1028    ondansetron (ZOFRAN-ODT) disintegrating tablet 4 mg  4 mg Oral Q8H PRN Markus Carola Frey MD        Or    ondansetron [facility]) injection 4 mg  4 mg IntraVENous Q6H PRN Cole Marsh MD   4 mg at 02/27/23 0849    acetaminophen (TYLENOL) tablet 650 mg  650 mg Oral Q6H PRN Cole Marsh MD   650 mg at 02/26/23 1353    Or    acetaminophen (TYLENOL) suppository 650 mg  650 mg Rectal Q6H PRN Cole Marsh MD        polyethylene glycol (GLYCOLAX) packet 17 g  17 g Oral Daily PRN Cole Marsh MD        magnesium hydroxide (MILK OF MAGNESIA) 400 MG/5ML suspension 30 mL  30 mL Oral Daily PRN Cole Marsh MD           Allergies:  No Known Allergies    Problem List:    Patient Active Problem List   Diagnosis Code    Abdominal pain R10.9    Nephrolithiasis N20.0    Obstruction of right ureter N13.5    Sepsis ([facility] [address] A41.9    Pyelonephritis N12    Tachycardia R00.0    Hypotension due to hypovolemia I95.89, E86.1    Diarrhea R19.7    Hypomagnesemia E83.42    Hyponatremia O79.7    Complicated UTI (urinary tract infection) N39.0       Past Medical History:        Diagnosis Date    ADHD (attention deficit hyperactivity disorder)     Learning difficulty     pt states only able to read at 2nd grade level    Premature baby     27 weeks       Past Surgical History:        Procedure Laterality Date    ACHILLES TENDON SURGERY      CHOLECYSTECTOMY, LAPAROSCOPIC N/A 7/16/2017    LAPAROSCOPY; OPEN CHOLECYSTECTOMY performed by Carlisle Spurling, MD at [address] [place] [address]      revision  shunt 1999    URETER SURGERY Right 1/26/2023    Cystoscopy Right Stent Insertion performed by Oniel Delgado MD at [facility]         Social History:    Social History     Tobacco Use    Smoking status: Never    Smokeless tobacco: Never   Substance Use Topics    Alcohol use: Yes     Comment: occasionally                                Counseling given: Not Answered      Vital Signs (Current):   Vitals:    02/27/23 0848 02/27/23 1312 02/27/23 0930 02/27/23 1100   BP: 97/69  92/78 (!) 96/52   Pulse:    (!) 119   Resp:  16  16   Temp:    99.1 °F (37.3 °C)   TempSrc:    Oral   SpO2:    91%   Weight:       Height:                                                  BP Readings from Last 3 Encounters:   02/27/23 (!) 96/52   02/01/23 112/64   07/31/17 118/70       NPO Status:                                                                                 BMI:   Wt Readings from Last 3 Encounters:   02/27/23 197 lb 1.6 oz (89.4 kg)   02/16/23 195 lb (88.5 kg)   01/31/23 196 lb 3.4 oz (89 kg)     Body mass index is 36.05 kg/m². CBC:   Lab Results   Component Value Date/Time    WBC 17.8 02/27/2023 05:58 AM    RBC 3.97 02/27/2023 05:58 AM    HGB 11.5 02/27/2023 05:58 AM    HCT 36.3 02/27/2023 05:58 AM    MCV 91.4 02/27/2023 05:58 AM    RDW 13.8 07/18/2017 09:10 AM     02/27/2023 05:58 AM       CMP:   Lab Results   Component Value Date/Time     02/27/2023 05:58 AM    K 3.7 02/27/2023 05:58 AM     02/27/2023 05:58 AM    CO2 26 02/27/2023 05:58 AM    BUN 14 02/27/2023 05:58 AM    CREATININE 1.2 02/27/2023 05:58 AM    LABGLOM >60 02/27/2023 05:58 AM    GLUCOSE 107 02/27/2023 05:58 AM    PROT 5.4 02/27/2023 05:58 AM    CALCIUM 8.0 02/27/2023 05:58 AM    BILITOT 1.1 02/27/2023 05:58 AM    ALKPHOS 102 02/27/2023 05:58 AM    AST 14 02/27/2023 05:58 AM    ALT 24 02/27/2023 05:58 AM       POC Tests: No results for input(s): POCGLU, POCNA, POCK, POCCL, POCBUN, POCHEMO, POCHCT in the last 72 hours.     Coags:   Lab Results   Component Value Date/Time    INR 1.33 01/27/2023 01:14 PM    APTT 22.9 01/27/2023 07:04 PM       HCG (If Applicable):   Lab Results   Component Value Date    PREGSERUM NEGATIVE 02/26/2023        ABGs: No results found for: PHART, PO2ART, SBM8FUU, MSP6ICF, BEART, [id number]     Type & Screen (If Applicable):  No results found for: LABABO, LABRH    Drug/Infectious Status (If Applicable):  No results found for: HIV, HEPCAB    COVID-19 Screening (If Applicable):   Lab Results   Component Value Date/Time    COVID19 NOT DETECTED 02/26/2023 06:15 AM    COVID19 Not Detected 01/27/2023 12:46 PM           Anesthesia Evaluation  Patient summary reviewed  Airway: Mallampati: III  TM distance: >3 FB   Neck ROM: full  Mouth opening: > = 3 FB   Dental:          Pulmonary:                              Cardiovascular:                      Neuro/Psych:   (+) psychiatric history:            GI/Hepatic/Renal:             Endo/Other:                     Abdominal:             Vascular: Other Findings:           Anesthesia Plan      general     ASA 2       Induction: intravenous and rapid sequence. MIPS: Postoperative opioids intended and Prophylactic antiemetics administered. Anesthetic plan and risks discussed with patient. Plan discussed with BUBBA. Aurelia Campbell.  [address] [place] [address]   2/27/2023

## 2023-02-27 NOTE — CARE COORDINATION
Case Management Assessment  Initial Evaluation    Date/Time of Evaluation: 2/27/2023 11:26 AM  Assessment Completed by: Frankie Posey RN    If patient is discharged prior to next notation, then this note serves as note for discharge by case management. Patient Name: Charline Sparrow                   YOB: 2000  Diagnosis: Acute kidney injury ([facility] [address] [N17.9]  Acute cystitis with hematuria [K21.03]  Complicated UTI (urinary tract infection) [N39.0]  Sepsis, due to unspecified organism, unspecified whether acute organ dysfunction present [facility]) [A41.9]                   Date / Time: 2/26/2023  5:33 AM  Location: [facility]     Patient Admission Status: Inpatient   Readmission Risk Low 0-14, Mod 15-19), High > 20: Readmission Risk Score: 13    Current PCP: DARI Gonzalez CNP  PCP verified by CM? Yes    Chart Reviewed: Yes      History Provided by: Patient  Patient Orientation: Alert and Oriented    Patient Cognition: Alert    Hospitalization in the last 30 days (Readmission):  Yes    If yes, Readmission Assessment in CM Navigator will be completed. Advance Directives:      Code Status: Full Code   Patient's Primary Decision Maker is: Patient Declined (Legal Next of Kin Remains as Decision Maker)    Primary Decision Maker: Zackary James - Parent - [phone]    Discharge Planning:    Patient lives with: Parent Type of Home: House  Primary Care Giver: Family  Patient Support Systems include: Parent   Current Financial resources: Medicaid  Current community resources:    Current services prior to admission: None            Current DME: Other (Comment) (na)            Type of Home Care services:  None    ADLS  Prior functional level: Assistance with the following:, Housework, Cooking  Current functional level: Assistance with the following:, Cooking, Housework    Family can provide assistance at DC:  Yes  Would you like Case Management to discuss the discharge plan with any other family members/significant others, and if so, who? No  Plans to Return to Present Housing: Yes  Other Identified Issues/Barriers to RETURNING to current housing: none  Potential Assistance needed at discharge: N/A            Potential DME:    Patient expects to discharge to: [address] [place] [address] for transportation at discharge: Family    Financial    Payor: Bisi / Plan: Bisi / Product Type: *No Product type* /     Does insurance require precert for SNF: Yes    Potential assistance Purchasing Medications: No  Meds-to-Beds request:        [address] [place] [address] [id number] [place] [id number] [facility] [phone] - [phone]  [address] [address]  Phone: [phone] Fax: [phone]      Notes:    Factors facilitating achievement of predicted outcomes: Family support, Cooperative, and Pleasant    Barriers to discharge: Pain, Cognitive deficit, Medical complications, and Medication managment    Additional Case Management Notes: Pt admitted through ER with c/o abdominal pain, n/v. IV LR infusing, IV Rocephin daily discontinued, IV Zosyn q 8hr, Urology following-Plan for right stent exchange tomorrow, ID following for ATB needs, IV and PO Pain meds PRN,     Procedure: 2/26: CT abd: Abnormal appearing right kidney. This appears large and there is decreased enhancement of the right kidney compared to the left. These findings are suggestive of pyelonephritis. Other etiologies are not excluded. An infiltrating process is also a consideration. Normal-appearing right ureteral stent. 2/26: CTA chest: No pulmonary emboli. Faint interstitial opacities throughout both lungs. These were also present previously. This can represent infiltrates. An infiltrating process or pulmonary edema are also considerations.  2 cm well-circumscribed right breast mass  2/26: Renal US: No suspicious findings  2/26: US liver: No suspicious findings  The Plan for Transition of Care is related to the following treatment goals of Acute kidney injury ([facility] [address] [N17.9]  Acute cystitis with hematuria [X72.38]  Complicated UTI (urinary tract infection) [N39.0]  Sepsis, due to unspecified organism, unspecified whether acute organ dysfunction present [facility]) [A41.9]    Patient Goals/Plan/Treatment Preferences: Spoke with pt. She says she lives home with mom who is at work currently but should be coming to hospital later. Pt does not have DME at home. Plans to return home with mother at discharge whom will also assist in transporting at that time. Transportation/Food Security/Housekeeping Addressed: No issues identified.      Glenny Thorne RN  Case Management Department

## 2023-02-28 ENCOUNTER — TELEPHONE (OUTPATIENT)
Dept: UROLOGY | Age: 23
End: 2023-02-28

## 2023-02-28 LAB
ALBUMIN SERPL BCG-MCNC: 2.9 G/DL (ref 3.5–5.1)
ALP SERPL-CCNC: 98 U/L (ref 38–126)
ALT SERPL W/O P-5'-P-CCNC: 20 U/L (ref 11–66)
ANION GAP SERPL CALC-SCNC: 11 MEQ/L (ref 8–16)
AST SERPL-CCNC: 10 U/L (ref 5–40)
BASOPHILS ABSOLUTE: 0 THOU/MM3 (ref 0–0.1)
BASOPHILS NFR BLD AUTO: 0.2 %
BILIRUB SERPL-MCNC: 0.5 MG/DL (ref 0.3–1.2)
BUN SERPL-MCNC: 15 MG/DL (ref 7–22)
CALCIUM SERPL-MCNC: 8.8 MG/DL (ref 8.5–10.5)
CHLORIDE SERPL-SCNC: 103 MEQ/L (ref 98–111)
CO2 SERPL-SCNC: 23 MEQ/L (ref 23–33)
CREAT SERPL-MCNC: 1 MG/DL (ref 0.4–1.2)
DEPRECATED RDW RBC AUTO: 49.3 FL (ref 35–45)
EOSINOPHIL NFR BLD AUTO: 0 %
EOSINOPHILS ABSOLUTE: 0 THOU/MM3 (ref 0–0.4)
ERYTHROCYTE [DISTWIDTH] IN BLOOD BY AUTOMATED COUNT: 14.6 % (ref 11.5–14.5)
GFR SERPL CREATININE-BSD FRML MDRD: > 60 ML/MIN/1.73M2
GLUCOSE SERPL-MCNC: 198 MG/DL (ref 70–108)
HCT VFR BLD AUTO: 38.3 % (ref 37–47)
HGB BLD-MCNC: 11.9 GM/DL (ref 12–16)
IMM GRANULOCYTES # BLD AUTO: 0.08 THOU/MM3 (ref 0–0.07)
IMM GRANULOCYTES NFR BLD AUTO: 0.7 %
LYMPHOCYTES ABSOLUTE: 1.1 THOU/MM3 (ref 1–4.8)
LYMPHOCYTES NFR BLD AUTO: 9.8 %
MAGNESIUM SERPL-MCNC: 2 MG/DL (ref 1.6–2.4)
MCH RBC QN AUTO: 28.5 PG (ref 26–33)
MCHC RBC AUTO-ENTMCNC: 31.1 GM/DL (ref 32.2–35.5)
MCV RBC AUTO: 91.6 FL (ref 81–99)
MONOCYTES ABSOLUTE: 0.3 THOU/MM3 (ref 0.4–1.3)
MONOCYTES NFR BLD AUTO: 2.8 %
NEUTROPHILS NFR BLD AUTO: 86.5 %
NRBC BLD AUTO-RTO: 0 /100 WBC
PHOSPHATE SERPL-MCNC: 1.5 MG/DL (ref 2.4–4.7)
PLATELET # BLD AUTO: 275 THOU/MM3 (ref 130–400)
PMV BLD AUTO: 12.5 FL (ref 9.4–12.4)
POTASSIUM SERPL-SCNC: 4.6 MEQ/L (ref 3.5–5.2)
PROT SERPL-MCNC: 5.9 G/DL (ref 6.1–8)
RBC # BLD AUTO: 4.18 MILL/MM3 (ref 4.2–5.4)
SEGMENTED NEUTROPHILS ABSOLUTE COUNT: 9.8 THOU/MM3 (ref 1.8–7.7)
SODIUM SERPL-SCNC: 137 MEQ/L (ref 135–145)
WBC # BLD AUTO: 11.3 THOU/MM3 (ref 4.8–10.8)

## 2023-02-28 PROCEDURE — 6360000002 HC RX W HCPCS: Performed by: UROLOGY

## 2023-02-28 PROCEDURE — 36415 COLL VENOUS BLD VENIPUNCTURE: CPT

## 2023-02-28 PROCEDURE — 99232 SBSQ HOSP IP/OBS MODERATE 35: CPT | Performed by: UROLOGY

## 2023-02-28 PROCEDURE — 2580000003 HC RX 258

## 2023-02-28 PROCEDURE — 99232 SBSQ HOSP IP/OBS MODERATE 35: CPT

## 2023-02-28 PROCEDURE — 85025 COMPLETE CBC W/AUTO DIFF WBC: CPT

## 2023-02-28 PROCEDURE — 83735 ASSAY OF MAGNESIUM: CPT

## 2023-02-28 PROCEDURE — 1200000003 HC TELEMETRY R&B

## 2023-02-28 PROCEDURE — 84100 ASSAY OF PHOSPHORUS: CPT

## 2023-02-28 PROCEDURE — 80053 COMPREHEN METABOLIC PANEL: CPT

## 2023-02-28 PROCEDURE — 2500000003 HC RX 250 WO HCPCS

## 2023-02-28 PROCEDURE — 6360000002 HC RX W HCPCS

## 2023-02-28 RX ADMIN — PIPERACILLIN AND TAZOBACTAM 3375 MG: 3; .375 INJECTION, POWDER, FOR SOLUTION INTRAVENOUS at 09:16

## 2023-02-28 RX ADMIN — PIPERACILLIN AND TAZOBACTAM 3375 MG: 3; .375 INJECTION, POWDER, FOR SOLUTION INTRAVENOUS at 16:45

## 2023-02-28 RX ADMIN — SODIUM PHOSPHATE, MONOBASIC, MONOHYDRATE AND SODIUM PHOSPHATE, DIBASIC, ANHYDROUS 22 MMOL: 276; 142 INJECTION, SOLUTION INTRAVENOUS at 11:44

## 2023-02-28 RX ADMIN — SODIUM CHLORIDE, POTASSIUM CHLORIDE, SODIUM LACTATE AND CALCIUM CHLORIDE: 600; 310; 30; 20 INJECTION, SOLUTION INTRAVENOUS at 20:48

## 2023-02-28 RX ADMIN — PIPERACILLIN AND TAZOBACTAM 3375 MG: 3; .375 INJECTION, POWDER, FOR SOLUTION INTRAVENOUS at 00:13

## 2023-02-28 RX ADMIN — FLUCONAZOLE IN SODIUM CHLORIDE 200 MG: 2 INJECTION, SOLUTION INTRAVENOUS at 09:17

## 2023-02-28 RX ADMIN — PIPERACILLIN AND TAZOBACTAM 3375 MG: 3; .375 INJECTION, POWDER, FOR SOLUTION INTRAVENOUS at 23:57

## 2023-02-28 RX ADMIN — MORPHINE SULFATE 4 MG: 4 INJECTION, SOLUTION INTRAMUSCULAR; INTRAVENOUS at 23:56

## 2023-02-28 ASSESSMENT — PAIN - FUNCTIONAL ASSESSMENT: PAIN_FUNCTIONAL_ASSESSMENT: ACTIVITIES ARE NOT PREVENTED

## 2023-02-28 ASSESSMENT — PAIN SCALES - GENERAL
PAINLEVEL_OUTOF10: 3
PAINLEVEL_OUTOF10: 0
PAINLEVEL_OUTOF10: 10
PAINLEVEL_OUTOF10: 0

## 2023-02-28 ASSESSMENT — PAIN DESCRIPTION - ONSET: ONSET: ON-GOING

## 2023-02-28 ASSESSMENT — PAIN DESCRIPTION - ORIENTATION: ORIENTATION: RIGHT

## 2023-02-28 ASSESSMENT — PAIN DESCRIPTION - LOCATION: LOCATION: ABDOMEN

## 2023-02-28 ASSESSMENT — PAIN DESCRIPTION - DESCRIPTORS: DESCRIPTORS: ACHING

## 2023-02-28 ASSESSMENT — PAIN DESCRIPTION - PAIN TYPE: TYPE: ACUTE PAIN

## 2023-02-28 ASSESSMENT — PAIN DESCRIPTION - FREQUENCY: FREQUENCY: CONTINUOUS

## 2023-02-28 NOTE — PROGRESS NOTES
Hospitalist Progress Note    Patient:  Ila [facility]      Unit/Bed:8B-21/021-A    YOB: 2000    MRN: [id number]       Acct: [de-identified]     PCP: DARI Coleman CNP    Date of Admission: 2/26/2023    Assessment/Plan:    Sepsis (POA) due to pyelonephritis  4/4 SIRS criteria (POA) (leukocytosis 32, tachycardia, fever 102, tachyphemic). Hypotensive, qSOFA 2/3. Lactic acid 1.7 (POA). Did have signs of organ failure with SAM, Cr 1.5  S/p 30 cc/kg IVF resuscitation. BC x 2 preliminary result NGTD  Urine cx, see below  Continue IV abx in #2  CTA chest negative for PE, showing faint interstitial opacities throughout both lungs, as noted on previous CTA chest dated 1/27/2023. Could represent infiltrates or infiltrating process such as pulmonary edema. Complicated UTI, suspected pyelonephritis s/p recent ureteral stenting 1/26:  Was recently admitted to the hospital from 1/26 - 2/1 in which patient was noted to be septic from pyelonephritis, had obstructing nephrolithiasis, which CT showed a 3 mm stone at the ureterovesicular junction which resulted in right hydronephrosis and right hydroureter. Patient underwent cystoscopy with right ureteral stent insertion by Dr. Bird Pickard on 1/26/2023. Cultures obtained on last admission were growth of contaminants, negative. Urology was consulted on this admission, who then consulted infectious disease. UA notable for positive nitrites, large LE, 300 protein, greater than 200 WBCs, moderate bacteria  Urine culture growing E. coli-pending sensitivity results. CT A/P showing abnormal appearing right kidney, appears larger and there is decreased enhancement of the right kidney compared to the left. These findings are suggestive of pyelonephritis. Other etiologies are not excluded, and infiltrating process is also a consideration. Normal-appearing right ureteral stent.   Renal ultrasound negative for suspicious findings  S/p cystoscopy with right ureteral stent exchange by Dr. Payal Aj 2/27/2023. Continue Zosyn (initiated 2/26) per ID  Urology consulted and following  ID consulted and following  SAM (POA), resolved  Secondary to volume depletion from sepsis and renal hypoperfusion from hypotension  Creatinine 1, down from 1.5 (POA)  Continuous IVF for hydration  Avoid nephrotoxic agents, renally dose medications  Daily BMP. AGMA, resolved  Secondary to #1  Resolved S/p IVF  2 cm right breast mass  Noted on CTA chest.  Recommend elective targeted right breast ultrasound. A follow-up outpatient with PCP for this. Hypomagnesemia  Magnesemia replacement protocol. Repeat Magnesium level in AM.  Hypophosphatemia  Phosphorus replacement protocol  Repeat phosphorus in a.m. Direct hyperbilirubinemia  Bilirubin 0.5, down from 1 (POA). Ictotest positive (POA)  Liver ultrasound noting CBD prominent measuring 1.1 cm however likely compensatory in nature postcholecystectomy. ,  No suspicious findings. Daily CMP  Hypoosmolar hypochloremia hyponatremia, resolved  Secondary to volume depletion from #1  S/p 30 cc/kg IVF bolus  Daily lab. Hypotension, improving  Secondary to #1. S/p sepsis IV fluid boluses. Continuous IV fluids for hydration. Monitor BP  Sinus tachycardia, improving  Secondary to #1  S/p IVF boluses. Continuous IVF's  Tele  Hx of hydrocephalus s/p  shunt:   Noted per chart review, sequelae of prematurity. Hx of premature birth, developmentally delayed  Chronic urinary incontinence:   Patient noted to be incontinent of urine multiple times on admission. Per patient's mother reports patient is always incontinent. Obesity obesity:   BMI 36.05 kg per metered squared.     Discussed and educated on lifestyle modifications          Expected discharge date:  Pending clinical course    Disposition:    [x] Home       [] TCU       [] Rehab       [] Psych       [] SNF       [] [place]       [] Other-    Chief Complaint: UTI, diffuse abdominal pain, flank pain, history of kidney stones and stent    Hospital Course: Per HPI documented 2/26/2023: \"Patient is a 64-year-old female past medical history of kidney stones with right ureteral stent, ADHD and learning difficulty who was admitted to [address] after presenting to the ED with nausea vomiting abdominal pain. Patient was previously admitted in January 2023 after she was found to have an obstructing stone and pyelonephritis for which she was treated with antibiotics and underwent stent placement with Dr. Nico Killian. She was doing fine until yesterday developed acute abdominal pain with nausea and some vomiting, nonbloody. In the ED patient met SIRS criteria and was found to have a UTI as well as possible right pyelonephritis, was admitted for further management and care. S/p sepsis fluids, initiated on empiric antibiotics with Rocephin. \"    2/27/2023: Resume care patient today. Patient resting in bed, nontoxic in appearance, no apparent distress. Afebrile, satting 97% on room air, hemodynamically stable. Patient stating that she is having some 3/10 abdominal pain after receiving morphine but states that it was a 10/10. States that she has some nausea, has had dry heaving. States she feels hot. Patient evaluated by urology services who is planning for stent exchange today. Continue IV antibiotics at this time, urine culture growing enteric gram-negative bacilli. Continue current IV antibiotics, tailor pending sensitivity results. ID consulted by admitting provider, aminata recs    2/28/23: Afebrile, hemodynamically stable. Patient is status post cystoscopy with right ureteral stent exchange yesterday. Currently patient is complaining of 9/10 right-sided abdominal pain. States that she feels nauseous this morning but was able to eat her breakfast.  States that she also has a sore throat from her procedure. Work on pain control, continue IV Zosyn per ID.   Urine culture growing E. coli, pending sensitivity report. Can discharge next 24 to 48 hours pending pain control and sensitivity report. Subjective (past 24 hours):      Denies chest pains, shortness of breath at rest, dyspnea on exertion, vomiting, diarrhea, fever, chills. Medications:  Reviewed    Infusion Medications    lactated ringers IV soln 75 mL/hr at 02/28/23 1151    sodium chloride       Scheduled Medications    phosphorus replacement protocol   Other RX Placeholder    fluconazole  200 mg IntraVENous Q24H    piperacillin-tazobactam  3,375 mg IntraVENous Q8H    sodium chloride flush  5-40 mL IntraVENous 2 times per day    enoxaparin  40 mg SubCUTAneous Daily     PRN Meds: phenol, morphine **OR** morphine, sodium chloride flush, sodium chloride, ondansetron **OR** ondansetron, acetaminophen **OR** acetaminophen, polyethylene glycol, magnesium hydroxide      Intake/Output Summary (Last 24 hours) at 2/28/2023 1820  Last data filed at 2/28/2023 8814  Gross per 24 hour   Intake --   Output 1150 ml   Net -1150 ml       Diet:  ADULT DIET; Regular    Exam:  BP (!) 126/94   Pulse 86   Temp 98.3 °F (36.8 °C) (Oral)   Resp 18   Ht 5' 2\" (1.575 m)   Wt 197 lb 1.6 oz (89.4 kg)   SpO2 96%   BMI 36.05 kg/m²     General appearance: No apparent distress, appears stated age and cooperative. Obese. HEENT: Pupils equal, round, and reactive to light. Conjunctivae/corneas clear. Neck: Supple, with full range of motion. No jugular venous distention. Trachea midline. Respiratory:  Normal respiratory effort. Able to speak full clear sentences. Clear to auscultation, bilaterally without Rales/Wheezes/Rhonchi. Cardiovascular: Regular rate and rhythm with normal S1/S2 without murmurs, rubs or gallops. Abdomen: Soft, RUQ and RLQ tenderness upon palpation, nontender in all other quadrants. , non-distended with normal bowel sounds. Musculoskeletal: passive and active ROM x 4 extremities.   Skin: Skin color, texture, turgor normal.  No rashes or lesions. Neurologic:  Neurovascularly intact without any focal sensory/motor deficits. Cranial nerves: II-XII intact, grossly non-focal.  Psychiatric: Alert and oriented, thought content appropriate, normal insight  Capillary Refill: Brisk,< 3 seconds   Peripheral Pulses: +2 palpable, equal bilaterally       Labs:   Recent Labs     02/26/23  0615 02/27/23  0558 02/28/23  1121   WBC 32.4* 17.8* 11.3*   HGB 13.7 11.5* 11.9*   HCT 41.0 36.3* 38.3    187 275     Recent Labs     02/26/23  0615 02/27/23  0558 02/28/23  0559   * 137 137   K 4.0 3.7 4.6   CL 94* 102 103   CO2 21* 26 23   BUN 19 14 15   CREATININE 1.5* 1.2 1.0   CALCIUM 9.1 8.0* 8.8   PHOS  --   --  1.5*     Recent Labs     02/26/23  0615 02/27/23  0558 02/28/23  0559   AST 35 14 10   ALT 44 24 20   BILIDIR 1.0* 0.7*  --    BILITOT 1.6* 1.1 0.5   ALKPHOS 129* 102 98     No results for input(s): INR in the last 72 hours. No results for input(s): Pasty Dragon in the last 72 hours. Microbiology:      Urinalysis:      Lab Results   Component Value Date/Time    NITRU POSITIVE 02/26/2023 07:00 AM    WBCUA > 200 02/26/2023 07:00 AM    BACTERIA MODERATE 02/26/2023 07:00 AM    RBCUA 3-5 02/26/2023 07:00 AM    BLOODU LARGE 02/26/2023 07:00 AM    SPECGRAV 1.009 01/26/2023 08:55 AM    GLUCOSEU NEGATIVE 02/26/2023 07:00 AM       Radiology:  CTA CHEST W WO CONTRAST    Result Date: 2/26/2023  PROCEDURE: CTA CHEST W WO CONTRAST CLINICAL INFORMATION: Chest pain, tachycardia, hypoxia, rule out pulmonary embolism. COMPARISON: CT chest 1/27/2023. TECHNIQUE: 3 mm axial images were obtained through the chest after the administration of IV contrast.  A non-contrast localizer was obtained. 3D reconstructions were performed on the scanner to include MIP coronal and sagittal images through the chest. Isovue was the intravenous contrast utilized.  All CT scans at this facility use dose modulation, iterative reconstruction, and/or weight-based dosing when appropriate to reduce radiation dose to as low as reasonably achievable. FINDINGS: There is adequate opacification of the pulmonary arterial system. No pulmonary emboli are present. The aorta is within acceptable limits. The heart size is normal. There is no pericardial or pleural effusion. There is no mediastinal, axillary or hilar adenopathy. As seen on the prior chest CT, there are faint areas of groundglass opacities throughout both lungs. Some of these appear interstitial. This is unchanged. This catheter present some inflammation versus mild pulmonary edema. Infection is not excluded. In the medial right breast, there is a 2 cm round lesion. This is seen on image 59. This is indeterminate. In the upper abdomen, there are surgical clips from a cholecystectomy. There is a  shunt in the soft tissues of the left chest wall. There are no suspicious osseous lesions. 1. No pulmonary emboli. 2. Faint interstitial opacities throughout both lungs. These were also present previously. This can represent infiltrates. An infiltrating process or pulmonary edema are also considerations. 3. 2 cm well-circumscribed right breast mass. An elective targeted right breast ultrasound is recommended. **This report has been created using voice recognition software. It may contain minor errors which are inherent in voice recognition technology. ** Final report electronically signed by Dr. Fidel Saini on 2/26/2023 8:30 AM    US RENAL COMPLETE    Result Date: 2/26/2023  PROCEDURE: US LIVER, US RENAL COMPLETE CLINICAL INFORMATION: 58-year-old female with right upper quadrant abdominal pain. Right-sided ureteral stent. COMPARISON: Correlation is made with CT scan 2/26/2023 7:32 AM TECHNIQUE: Multiplanar grayscale and color flow sonographic images were obtained of the upper abdomen including the liver and bilateral kidneys.  FINDINGS: RIGHT KIDNEY - 10.20 x 6.587 x 7.73 cm Resistive Index - 0.68 Cortical Thickness - 1.53 cm LEFT KIDNEY - 11.94 x 5.68 x 5.63 cm Resistive Index - 0.64 Cortical Thickness - 1.53 cm URINARY BLADDER Pre-Void - 91 mL Post-Void - / mL Liver- 15.87 cm CBD- 1.11 cm The liver is of normal echogenicity. There are no liver masses. There is no intrahepatic biliary ductal dilatation. There is normal color flow in the main portal vein, right and left portal veins, hepatic artery, inferior vena cava and all 3 hepatic veins. The pancreatic head and body are within normal limits. The tail is not well seen due to overlying bowel gas. The gallbladder is surgically absent. The common bile duct is prominent measuring 1.1 cm. This is likely compensatory in nature post cholecystectomy. There is normal echogenicity of the renal parenchyma bilaterally. There is no thinning of the renal cortex. There is mild prominence of the right renal pelvis without sarthak hydronephrosis. No stones are noted. No mass lesions are noted. The urinary bladder is incompletely distended. No gross abnormalities are evident. There is a right-sided ureteral stent extending into the bladder. The left ureteral jet is visualized. No suspicious findings. **This report has been created using voice recognition software. It may contain minor errors which are inherent in voice recognition technology. ** Final report electronically signed by Dr Virginia Alberts on 2/26/2023 1:59 PM    CT ABDOMEN PELVIS W IV CONTRAST Additional Contrast? None    Result Date: 2/26/2023  PROCEDURE: CT ABDOMEN PELVIS W IV CONTRAST CLINICAL INFORMATION: Hx of right ureter stent . Nausea and vomiting. COMPARISON: CT abdomen pelvis 1/27/2023. TECHNIQUE: Axial 5 mm CT images were obtained through the abdomen and pelvis after the administration of intravenous contrast. Coronal and sagittal reconstructions were obtained.  All CT scans at this facility use dose modulation, iterative reconstruction, and/or weight-based dosing when appropriate to reduce radiation dose to as low as reasonably achievable. FINDINGS: The visualized aspects of the lung bases are clear. The base of the heart is within appropriate limits. The liver is normal. The spleen is normal. The adrenals and pancreas are normal. There are surgical clips were prior cholecystectomy. Tubing from a  shunt is seen. The tip is along the surface of the liver. There is no associated fluid collection. The right kidney is significantly larger than the left. This appears slightly larger than on the prior exam. There is decreased enhancement of the cortex of the right kidney compared to the left. There is blurring of the corticomedullary junction. There is a hazy appearance to the fat in the hilum. There is a right ureteral stent in place. This appears stable in position. The left kidney appears normal. No abnormalities of the small bowel loops are noted. The IVC and aorta are of normal caliber. There is no adenopathy. The urinary bladder is relatively empty. There is no pelvic free fluid. The colon is within normal limits. There is a normal appendix in the right lower quadrant. The uterus and adnexa appear normal. No suspicious osseous lesions are identified. 1. Abnormal appearing right kidney. This appears large and there is decreased enhancement of the right kidney compared to the left. These findings are suggestive of pyelonephritis. Other etiologies are not excluded. An infiltrating process is also a consideration. 2. Normal-appearing right ureteral stent. **This report has been created using voice recognition software. It may contain minor errors which are inherent in voice recognition technology. ** Final report electronically signed by Dr. Idania Whipple on 2/26/2023 8:37 AM    US LIVER    Result Date: 2/26/2023  PROCEDURE: US LIVER, US RENAL COMPLETE CLINICAL INFORMATION: 64-year-old female with right upper quadrant abdominal pain. Right-sided ureteral stent.  COMPARISON: Correlation is made with CT scan 2/26/2023 7:32 AM TECHNIQUE: Multiplanar grayscale and color flow sonographic images were obtained of the upper abdomen including the liver and bilateral kidneys. FINDINGS: RIGHT KIDNEY - 10.20 x 6.587 x 7.73 cm Resistive Index - 0.68 Cortical Thickness - 1.53 cm LEFT KIDNEY - 11.94 x 5.68 x 5.63 cm Resistive Index - 0.64 Cortical Thickness - 1.53 cm URINARY BLADDER Pre-Void - 91 mL Post-Void - / mL Liver- 15.87 cm CBD- 1.11 cm The liver is of normal echogenicity. There are no liver masses. There is no intrahepatic biliary ductal dilatation. There is normal color flow in the main portal vein, right and left portal veins, hepatic artery, inferior vena cava and all 3 hepatic veins. The pancreatic head and body are within normal limits. The tail is not well seen due to overlying bowel gas. The gallbladder is surgically absent. The common bile duct is prominent measuring 1.1 cm. This is likely compensatory in nature post cholecystectomy. There is normal echogenicity of the renal parenchyma bilaterally. There is no thinning of the renal cortex. There is mild prominence of the right renal pelvis without sarthak hydronephrosis. No stones are noted. No mass lesions are noted. The urinary bladder is incompletely distended. No gross abnormalities are evident. There is a right-sided ureteral stent extending into the bladder. The left ureteral jet is visualized. No suspicious findings. **This report has been created using voice recognition software. It may contain minor errors which are inherent in voice recognition technology. ** Final report electronically signed by Dr Ervin Bee on 2/26/2023 1:59 PM      DVT prophylaxis: [x] Lovenox                                 [] SCDs                                 [] SQ Heparin                                 [] Encourage ambulation           [] Already on Anticoagulation     Code Status: Full Code    PT/OT Eval Status: No    Tele:   [x] yes             [] no    Active Hospital Problems Diagnosis Date Noted    Complicated UTI (urinary tract infection) [N39.0] 02/26/2023     Priority: Medium    Sepsis ([facility] [address] [A41.9] 01/27/2023     Priority: Medium       Electronically signed by DARI Ordoñez CNP on 2/28/2023 at 6:26 PM

## 2023-02-28 NOTE — TELEPHONE ENCOUNTER
Had right stent exchange with Dr Ritter Staff  Will need scheduled for right works with Dr Stu Aparicio in the upcoming wks

## 2023-02-28 NOTE — CARE COORDINATION
2/28/23, 7:56 AM EST    DISCHARGE ON [address] day: 2  Location: [facility]/021-A Reason for admit: Acute kidney injury [facility]) [N17.9]  Acute cystitis with hematuria [N72.03]  Complicated UTI (urinary tract infection) [N39.0]  Sepsis, due to unspecified organism, unspecified whether acute organ dysfunction present [facility] [A41.9]   Procedure: 2-27-23  1. Cystoscopy. 2. Right sided stent exchange. DRAINS: Right 6x26 cm cm double-J ureteral stent  SPECIMEN: none  ANESTHESIA: MAC  ESTIMATED BLOOD LOSS: None. COMPLICATIONS: None. Barriers to Discharge: Urology and ID. IV Diflucan. LR at 100/hr. IV Rocephin. Afebrile and on room air. ID recommending cont present tx and discharge in 24-48 hours possibly. PCP: DARI Hastings CNP  Readmission Risk Score: 13%  Patient Goals/Plan/Treatment Preferences: Plans return home with mother.

## 2023-02-28 NOTE — TELEPHONE ENCOUNTER
Patient is scheduled for surgery with Dr Bob Lamas on 3/9/23. Surgery consent on arrival. Patient will have an adult over the age of 25 with them at discharge and 24 hours after procedure. No pre op testing needed. Surgery instructions gone over verbally and mailed to patient.

## 2023-02-28 NOTE — PROGRESS NOTES
Ashvin Cook, DARI - CNP  Urology Progress Note    Subjective: Ailin Bocanegra is a 23 y.o. female. s/p CYSTOSCOPY RIGHT STENT EXCHANGE on 2/26/2023 - 2/27/2023    His/Her current Diet is: ADULT DIET; Regular. Since the previous note, the patient reports the following:  No acute issues overnight. No fevers or chills. No nausea or vomiting. No chest pain or shortness of breath. No calf pain. Still with pain  Ambulating. Tolerating PO Diet. Plan: Will schedule right works in the upcoming wks  ID on case, appreciate recs  Bcx neg, Ux +ecoli  Will needs 10-14 DOT   On Diflucan and Zosyn    Vitals and Labs:  Patient Vitals for the past 24 hrs:   BP Temp Temp src Pulse Resp SpO2   02/28/23 0900 (!) 122/94 98.2 °F (36.8 °C) Oral 89 18 96 %   02/28/23 0348 109/71 98.1 °F (36.7 °C) Oral 88 16 93 %   02/28/23 0047 117/74 97.9 °F (36.6 °C) Oral 95 16 90 %   02/27/23 2143 103/63 98 °F (36.7 °C) Oral 90 16 94 %   02/27/23 1530 103/63 98.5 °F (36.9 °C) Oral 100 14 91 %   02/27/23 1515 108/71 98.6 °F (37 °C) Oral 97 16 94 %   02/27/23 1500 109/72 98.7 °F (37.1 °C) Oral (!) 103 14 92 %   02/27/23 1450 112/62 -- -- 93 16 97 %   02/27/23 1445 113/66 -- -- 95 16 96 %   02/27/23 1440 (!) 116/58 -- -- 96 17 96 %   02/27/23 1435 115/63 -- -- 99 16 (!) 89 %   02/27/23 1430 114/67 -- -- 95 18 98 %   02/27/23 1425 111/62 -- -- 97 18 99 %   02/27/23 1420 112/68 -- -- 98 18 98 %   02/27/23 1415 115/67 -- -- 100 17 98 %   02/27/23 1410 (!) 153/86 98.3 °F (36.8 °C) Temporal (!) 108 10 97 %     I/O last 3 completed shifts:   In: 1047.3 [I.V.:1000; IV Piggyback:47.3]  Out: 1210 [Urine:1210]    Recent Labs     02/26/23  0615 02/27/23  0558   WBC 32.4* 17.8*   HGB 13.7 11.5*   HCT 41.0 36.3*   MCV 88.4 91.4    187     Recent Labs     02/26/23  0615 02/27/23  0558 02/28/23  0559   * 137 137   K 4.0 3.7 4.6   CL 94* 102 103   CO2 21* 26 23   PHOS  --   --  1.5*   BUN 19 14 15   CREATININE 1.5* 1.2 1.0       Recent Labs     02/26/23  0700   COLORU DK YELLOW*   PHUR 5.5   WBCUA > 200   RBCUA 3-5   MUCUS NONE SEEN   YEAST FEW BUDDING   BACTERIA MODERATE   LEUKOCYTESUR LARGE*   UROBILINOGEN 1.0   BILIRUBINUR SMALL*   BLOODU LARGE*       Physical Exam:  No acute distress. Awake, alert and oriented. Neck is supple. Throat pain  Regular rate and rhythm. Normal peripheral pulses  No accessory muscles of inspiration. Symmetric chest rise  Abdomen soft, non-tender, non-distended. +flank pain. No calf pain. Minimal/no edema in bilateral lower extremities. Skin is warm, dry  Psych: mood, affect normal    Additional Lab/Culture results:     Imaging Reviewed:     DARI Resendiz CNP independently reviewed the images and verified the radiology reports from:    CTA CHEST W WO CONTRAST    Result Date: 2/26/2023  PROCEDURE: CTA CHEST W WO CONTRAST CLINICAL INFORMATION: Chest pain, tachycardia, hypoxia, rule out pulmonary embolism. COMPARISON: CT chest 1/27/2023. TECHNIQUE: 3 mm axial images were obtained through the chest after the administration of IV contrast.  A non-contrast localizer was obtained. 3D reconstructions were performed on the scanner to include MIP coronal and sagittal images through the chest. Isovue was the intravenous contrast utilized. All CT scans at this facility use dose modulation, iterative reconstruction, and/or weight-based dosing when appropriate to reduce radiation dose to as low as reasonably achievable. FINDINGS: There is adequate opacification of the pulmonary arterial system. No pulmonary emboli are present. The aorta is within acceptable limits. The heart size is normal. There is no pericardial or pleural effusion. There is no mediastinal, axillary or hilar adenopathy. As seen on the prior chest CT, there are faint areas of groundglass opacities throughout both lungs. Some of these appear interstitial. This is unchanged. This catheter present some inflammation versus mild pulmonary edema. Infection is not excluded. In the medial right breast, there is a 2 cm round lesion. This is seen on image 59. This is indeterminate. In the upper abdomen, there are surgical clips from a cholecystectomy. There is a  shunt in the soft tissues of the left chest wall. There are no suspicious osseous lesions. 1. No pulmonary emboli. 2. Faint interstitial opacities throughout both lungs. These were also present previously. This can represent infiltrates. An infiltrating process or pulmonary edema are also considerations. 3. 2 cm well-circumscribed right breast mass. An elective targeted right breast ultrasound is recommended. **This report has been created using voice recognition software. It may contain minor errors which are inherent in voice recognition technology. ** Final report electronically signed by Dr. Benji Mario on 2/26/2023 8:30 AM    US RENAL COMPLETE    Result Date: 2/26/2023  PROCEDURE: US LIVER, US RENAL COMPLETE CLINICAL INFORMATION: 49-year-old female with right upper quadrant abdominal pain. Right-sided ureteral stent. COMPARISON: Correlation is made with CT scan 2/26/2023 7:32 AM TECHNIQUE: Multiplanar grayscale and color flow sonographic images were obtained of the upper abdomen including the liver and bilateral kidneys. FINDINGS: RIGHT KIDNEY - 10.20 x 6.587 x 7.73 cm Resistive Index - 0.68 Cortical Thickness - 1.53 cm LEFT KIDNEY - 11.94 x 5.68 x 5.63 cm Resistive Index - 0.64 Cortical Thickness - 1.53 cm URINARY BLADDER Pre-Void - 91 mL Post-Void - / mL Liver- 15.87 cm CBD- 1.11 cm The liver is of normal echogenicity. There are no liver masses. There is no intrahepatic biliary ductal dilatation. There is normal color flow in the main portal vein, right and left portal veins, hepatic artery, inferior vena cava and all 3 hepatic veins. The pancreatic head and body are within normal limits. The tail is not well seen due to overlying bowel gas. The gallbladder is surgically absent.  The common bile duct is prominent measuring 1.1 cm. This is likely compensatory in nature post cholecystectomy. There is normal echogenicity of the renal parenchyma bilaterally. There is no thinning of the renal cortex. There is mild prominence of the right renal pelvis without sarthak hydronephrosis. No stones are noted. No mass lesions are noted. The urinary bladder is incompletely distended. No gross abnormalities are evident. There is a right-sided ureteral stent extending into the bladder. The left ureteral jet is visualized. No suspicious findings. **This report has been created using voice recognition software. It may contain minor errors which are inherent in voice recognition technology. ** Final report electronically signed by Dr Patria Vicente on 2/26/2023 1:59 PM    CT ABDOMEN PELVIS W IV CONTRAST Additional Contrast? None    Result Date: 2/26/2023  PROCEDURE: CT ABDOMEN PELVIS W IV CONTRAST CLINICAL INFORMATION: Hx of right ureter stent . Nausea and vomiting. COMPARISON: CT abdomen pelvis 1/27/2023. TECHNIQUE: Axial 5 mm CT images were obtained through the abdomen and pelvis after the administration of intravenous contrast. Coronal and sagittal reconstructions were obtained. All CT scans at this facility use dose modulation, iterative reconstruction, and/or weight-based dosing when appropriate to reduce radiation dose to as low as reasonably achievable. FINDINGS: The visualized aspects of the lung bases are clear. The base of the heart is within appropriate limits. The liver is normal. The spleen is normal. The adrenals and pancreas are normal. There are surgical clips were prior cholecystectomy. Tubing from a  shunt is seen. The tip is along the surface of the liver. There is no associated fluid collection. The right kidney is significantly larger than the left.  This appears slightly larger than on the prior exam. There is decreased enhancement of the cortex of the right kidney compared to the left. There is blurring of the corticomedullary junction. There is a hazy appearance to the fat in the hilum. There is a right ureteral stent in place. This appears stable in position. The left kidney appears normal. No abnormalities of the small bowel loops are noted. The IVC and aorta are of normal caliber. There is no adenopathy. The urinary bladder is relatively empty. There is no pelvic free fluid. The colon is within normal limits. There is a normal appendix in the right lower quadrant. The uterus and adnexa appear normal. No suspicious osseous lesions are identified. 1. Abnormal appearing right kidney. This appears large and there is decreased enhancement of the right kidney compared to the left. These findings are suggestive of pyelonephritis. Other etiologies are not excluded. An infiltrating process is also a consideration. 2. Normal-appearing right ureteral stent. **This report has been created using voice recognition software. It may contain minor errors which are inherent in voice recognition technology. ** Final report electronically signed by Dr. Wendi Gastelum on 2/26/2023 8:37 AM    US LIVER    Result Date: 2/26/2023  PROCEDURE: US LIVER, US RENAL COMPLETE CLINICAL INFORMATION: 70-year-old female with right upper quadrant abdominal pain. Right-sided ureteral stent. COMPARISON: Correlation is made with CT scan 2/26/2023 7:32 AM TECHNIQUE: Multiplanar grayscale and color flow sonographic images were obtained of the upper abdomen including the liver and bilateral kidneys. FINDINGS: RIGHT KIDNEY - 10.20 x 6.587 x 7.73 cm Resistive Index - 0.68 Cortical Thickness - 1.53 cm LEFT KIDNEY - 11.94 x 5.68 x 5.63 cm Resistive Index - 0.64 Cortical Thickness - 1.53 cm URINARY BLADDER Pre-Void - 91 mL Post-Void - / mL Liver- 15.87 cm CBD- 1.11 cm The liver is of normal echogenicity. There are no liver masses. There is no intrahepatic biliary ductal dilatation.  There is normal color flow in the main portal vein, right and left portal veins, hepatic artery, inferior vena cava and all 3 hepatic veins. The pancreatic head and body are within normal limits. The tail is not well seen due to overlying bowel gas. The gallbladder is surgically absent. The common bile duct is prominent measuring 1.1 cm. This is likely compensatory in nature post cholecystectomy. There is normal echogenicity of the renal parenchyma bilaterally. There is no thinning of the renal cortex. There is mild prominence of the right renal pelvis without sarthak hydronephrosis. No stones are noted. No mass lesions are noted. The urinary bladder is incompletely distended. No gross abnormalities are evident. There is a right-sided ureteral stent extending into the bladder. The left ureteral jet is visualized. No suspicious findings. **This report has been created using voice recognition software. It may contain minor errors which are inherent in voice recognition technology. ** Final report electronically signed by Dr Corona Shown on 2/26/2023 1:59 PM      Impression:    Patient Active Problem List   Diagnosis    Abdominal pain    Nephrolithiasis    Obstruction of right ureter    Sepsis ([address]    Pyelonephritis    Tachycardia    Hypotension due to hypovolemia    Diarrhea    Hypomagnesemia    Hyponatremia    Complicated UTI (urinary tract infection)       s/p CYSTOSCOPY RIGHT STENT EXCHANGE on 2/26/2023 - 2/27/2023    Marlee Cartagena, DARI - CNP  11:56 AM 2/28/2023

## 2023-02-28 NOTE — TELEPHONE ENCOUNTER
SURGERY [address]  [facility] [address] [facility] [address] [facility], [address]      Phone *[phone] *[phone]   Surgical Scheduling Direct Line Phone *[phone] Fax *[phone]      Dock Rise 2000 female    [address]   Marital Status:          Home Phone: [phone]      Cell Phone:    Telephone Information:   Mobile [phone]          Surgeon: Dr. Andrew Plata Surgery Date: 3/9/23   Time: 12:00 PM    Procedure: Cystoscopy, right ureteroscopy, laser lithotripsy, basket retrieval of stone fragments, and possible right ureteral stent exchange vs removal     Diagnosis: Kidney stones     Important Medical History:  In Epic    Special Inst/Equip:     CPT Codes:    22615  Latex Allergy: No     Cardiac Device:  No    Anesthesia:  General          Admission Type:  Same Day                        Admit Prior to Day of Surgery: No    Case Location:  Main OR            Preadmission Testing:  Phone Call          PAT Date and Time:______________________________________________________    PAT Confirmation #: ______________________________________________________    Post Op Visit: ___________________________________________________________    Need Preop Cardiac Clearance: No    Does Patient have Cardiologist/physician?      None    Surgery Confirmation #: __________________________________________________    : ________________________   Date: __________________________     [employer] Name: [place]

## 2023-02-28 NOTE — TELEPHONE ENCOUNTER
DO NOT TAKE ASPIRIN,  FISH OIL, MOBIC,COUMADIN, IBUPROFEN, MOTRIN-LIKE DRUGS AND ANY MULTIVITAMINS OR OVER THE COUNTER SUPPLEMENTS 14 DAYS PRIOR TO SURGERY. MUST HAVE AN ADULT OVER THE AGE OF 18 WITH YOU AT THE TIME OF THE DISCHARGE AND WITH YOU AT HOME AFTER THE PROCEDURE FOR 24 HOURS          Hyacinth Bravo 2000 Diagnosis:     Surgical Physician: Dr. Khan intermediate have been scheduled for the procedure marked below:      Surgery: Cystoscopy, right ureteroscopy, laser lithotripsy, basket retrieval of stone fragments, and possible right ureteral stent exchange vs removal         Date: 3/9/23     Anesthesia: Anesthesiologist (General/Spinal)     Place of Service: [facility] Second Floor Same Day Surgery         Arrive to same day surgery by:  10:00 AM  (Surgery time is subject to change)      INSTRUCTIONS AS MARKED BELOW:    1.  DO NOT eat or drink anything after midnight before surgery. 2.  We prefer you shower or bathe with an antibacterial soap (Dial) the morning of surgery. 3  Please bring a current medication list, photo ID and insurance card(s) with you  4. Okay to take Tylenol  5. If you take Glucophage, Metformin or Janumet, hold 48-hours prior to surgery  6  Take blood pressure or heart medication as directed, if taken in the morning take with a small sip of water  7. The office will call you in 1-2 days after your procedure to schedule a follow up.             Date: 2/28/2023

## 2023-02-28 NOTE — PROGRESS NOTES
Progress note: Infectious diseases    Patient - Kristen Tilley,  Age - 23 y. o.    - 2000      Room Number - 8B-21/021-A   MRN -  [id number]   Acct # - [de-identified]  Date of Admission -  2023  5:33 AM    SUBJECTIVE:   She had stent exchange  Urine cx growing E.coli  OBJECTIVE   VITALS    height is 5' 2\" (1.575 m) and weight is 197 lb 1.6 oz (89.4 kg). Her oral temperature is 98.1 °F (36.7 °C). Her blood pressure is 109/71 and her pulse is 88. Her respiration is 16 and oxygen saturation is 93%. Wt Readings from Last 3 Encounters:   23 197 lb 1.6 oz (89.4 kg)   23 195 lb (88.5 kg)   23 196 lb 3.4 oz (89 kg)       I/O (24 Hours)    Intake/Output Summary (Last 24 hours) at 2023 0831  Last data filed at 2023 6134  Gross per 24 hour   Intake 1000 ml   Output 1210 ml   Net -210 ml       General Appearance  Awake, alert, oriented,  not  In acute distress  HEENT - normocephalic, atraumatic, pink conjunctiva,  anicteric sclera  Neck - Supple, no mass  Lungs -  Bilateral good air entry, no rhonchi, no wheeze  Cardiovascular - Heart sounds are normal.  Regular rate and rhythm without murmur, gallop or rub.   Abdomen - soft, not distended, nontender,   Neurologic -oriented  Skin - No bruising or bleeding  Extremities - No edema, no cyanosis, clubbing     MEDICATIONS:      phosphorus replacement protocol   Other RX Placeholder    fluconazole  200 mg IntraVENous Q24H    piperacillin-tazobactam  3,375 mg IntraVENous Q8H    sodium chloride flush  5-40 mL IntraVENous 2 times per day    enoxaparin  40 mg SubCUTAneous Daily      lactated ringers IV soln 100 mL/hr at 23 0447    sodium chloride       morphine **OR** morphine, sodium chloride flush, sodium chloride, ondansetron **OR** ondansetron, acetaminophen **OR** acetaminophen, polyethylene glycol, magnesium hydroxide      LABS:     CBC: Recent Labs     02/26/23  0615 02/27/23  0558   WBC 32.4* 17.8*   HGB 13.7 11.5*    187     BMP:    Recent Labs     02/26/23  0615 02/27/23  0558 02/28/23  0559   * 137 137   K 4.0 3.7 4.6   CL 94* 102 103   CO2 21* 26 23   BUN 19 14 15   CREATININE 1.5* 1.2 1.0   GLUCOSE 167* 107 198*     Calcium:  Recent Labs     02/28/23  0559   CALCIUM 8.8     Ionized Calcium:No results for input(s): IONCA in the last 72 hours. Magnesium:  Recent Labs     02/28/23  0559   MG 2.0     Phosphorus:  Recent Labs     02/28/23  0559   PHOS 1.5*      Hepatic:   Recent Labs     02/26/23  0615 02/27/23  0558 02/28/23  0559   ALKPHOS 129* 102 98   ALT 44 24 20   AST 35 14 10   PROT 7.7 5.4* 5.9*   BILITOT 1.6* 1.1 0.5   BILIDIR 1.0* 0.7*  --    LABALBU 3.7 2.8* 2.9*     Amylase and Lipase:  Recent Labs     02/27/23  0558   LACTA 1.0     Lactic Acid:   Recent Labs     02/27/23  0558   LACTA 1.0     Troponin: No results for input(s): CKTOTAL, CKMB, TROPONINI in the last 72 hours. BNP: No results for input(s): BNP in the last 72 hours. CULTURES:   UA:   Recent Labs     02/26/23  0700   PHUR 5.5   COLORU DK YELLOW*   MUCUS NONE SEEN   PROTEINU 300*   BLOODU LARGE*   RBCUA 3-5   WBCUA > 200   BACTERIA MODERATE   NITRU POSITIVE*   GLUCOSEU NEGATIVE   BILIRUBINUR SMALL*   UROBILINOGEN 1.0   KETUA TRACE*     Micro:   Lab Results   Component Value Date/Time    BC No growth 24 hours. No growth 48 hours.  02/26/2023 06:19 AM          Problem list of patient:     Patient Active Problem List   Diagnosis Code    Abdominal pain R10.9    Nephrolithiasis N20.0    Obstruction of right ureter N13.5    Sepsis ([address] A41.9    Pyelonephritis N12    Tachycardia R00.0    Hypotension due to hypovolemia I95.89, E86.1    Diarrhea R19.7    Hypomagnesemia E83.42    Hyponatremia L58.9    Complicated UTI (urinary tract infection) N39.0         ASSESSMENT/PLAN   Pyelonephrites  Hx of kidney stones: stent was exchanged  Continue current treatment  Discharge plan in 1-2 days.       Lindsay Lentz MD, MD, FACP 2/28/2023 8:31 AM

## 2023-03-01 ENCOUNTER — PREP FOR PROCEDURE (OUTPATIENT)
Dept: UROLOGY | Age: 23
End: 2023-03-01

## 2023-03-01 ENCOUNTER — TELEPHONE (OUTPATIENT)
Dept: UROLOGY | Age: 23
End: 2023-03-01

## 2023-03-01 VITALS
DIASTOLIC BLOOD PRESSURE: 97 MMHG | BODY MASS INDEX: 35.99 KG/M2 | SYSTOLIC BLOOD PRESSURE: 125 MMHG | TEMPERATURE: 98.8 F | OXYGEN SATURATION: 96 % | WEIGHT: 195.6 LBS | RESPIRATION RATE: 18 BRPM | HEIGHT: 62 IN | HEART RATE: 90 BPM

## 2023-03-01 LAB
ALBUMIN SERPL BCG-MCNC: 2.9 G/DL (ref 3.5–5.1)
ALP SERPL-CCNC: 83 U/L (ref 38–126)
ALT SERPL W/O P-5'-P-CCNC: 15 U/L (ref 11–66)
ANION GAP SERPL CALC-SCNC: 9 MEQ/L (ref 8–16)
AST SERPL-CCNC: 9 U/L (ref 5–40)
BASOPHILS ABSOLUTE: 0 THOU/MM3 (ref 0–0.1)
BASOPHILS NFR BLD AUTO: 0.2 %
BILIRUB SERPL-MCNC: 0.3 MG/DL (ref 0.3–1.2)
BUN SERPL-MCNC: 13 MG/DL (ref 7–22)
CALCIUM SERPL-MCNC: 8.6 MG/DL (ref 8.5–10.5)
CHLORIDE SERPL-SCNC: 104 MEQ/L (ref 98–111)
CO2 SERPL-SCNC: 23 MEQ/L (ref 23–33)
CREAT SERPL-MCNC: 0.9 MG/DL (ref 0.4–1.2)
DEPRECATED RDW RBC AUTO: 48.1 FL (ref 35–45)
EOSINOPHIL NFR BLD AUTO: 0 %
EOSINOPHILS ABSOLUTE: 0 THOU/MM3 (ref 0–0.4)
ERYTHROCYTE [DISTWIDTH] IN BLOOD BY AUTOMATED COUNT: 14.6 % (ref 11.5–14.5)
GFR SERPL CREATININE-BSD FRML MDRD: > 60 ML/MIN/1.73M2
GLUCOSE SERPL-MCNC: 141 MG/DL (ref 70–108)
HCT VFR BLD AUTO: 40.2 % (ref 37–47)
HGB BLD-MCNC: 12.9 GM/DL (ref 12–16)
IMM GRANULOCYTES # BLD AUTO: 0.13 THOU/MM3 (ref 0–0.07)
IMM GRANULOCYTES NFR BLD AUTO: 1.1 %
LYMPHOCYTES ABSOLUTE: 1.8 THOU/MM3 (ref 1–4.8)
LYMPHOCYTES NFR BLD AUTO: 15.5 %
MAGNESIUM SERPL-MCNC: 1.9 MG/DL (ref 1.6–2.4)
MCH RBC QN AUTO: 28.9 PG (ref 26–33)
MCHC RBC AUTO-ENTMCNC: 32.1 GM/DL (ref 32.2–35.5)
MCV RBC AUTO: 89.9 FL (ref 81–99)
MONOCYTES ABSOLUTE: 0.5 THOU/MM3 (ref 0.4–1.3)
MONOCYTES NFR BLD AUTO: 4.1 %
NEUTROPHILS NFR BLD AUTO: 79.1 %
NRBC BLD AUTO-RTO: 0 /100 WBC
PHOSPHATE SERPL-MCNC: 3.2 MG/DL (ref 2.4–4.7)
PLATELET # BLD AUTO: 306 THOU/MM3 (ref 130–400)
PMV BLD AUTO: 12.2 FL (ref 9.4–12.4)
POTASSIUM SERPL-SCNC: 4.4 MEQ/L (ref 3.5–5.2)
PROT SERPL-MCNC: 5.8 G/DL (ref 6.1–8)
RBC # BLD AUTO: 4.47 MILL/MM3 (ref 4.2–5.4)
SEGMENTED NEUTROPHILS ABSOLUTE COUNT: 9.3 THOU/MM3 (ref 1.8–7.7)
SODIUM SERPL-SCNC: 136 MEQ/L (ref 135–145)
WBC # BLD AUTO: 11.8 THOU/MM3 (ref 4.8–10.8)

## 2023-03-01 PROCEDURE — 2580000003 HC RX 258

## 2023-03-01 PROCEDURE — 6360000002 HC RX W HCPCS: Performed by: UROLOGY

## 2023-03-01 PROCEDURE — 80053 COMPREHEN METABOLIC PANEL: CPT

## 2023-03-01 PROCEDURE — 36415 COLL VENOUS BLD VENIPUNCTURE: CPT

## 2023-03-01 PROCEDURE — 6360000002 HC RX W HCPCS: Performed by: STUDENT IN AN ORGANIZED HEALTH CARE EDUCATION/TRAINING PROGRAM

## 2023-03-01 PROCEDURE — 85025 COMPLETE CBC W/AUTO DIFF WBC: CPT

## 2023-03-01 PROCEDURE — 84100 ASSAY OF PHOSPHORUS: CPT

## 2023-03-01 PROCEDURE — 83735 ASSAY OF MAGNESIUM: CPT

## 2023-03-01 PROCEDURE — 99232 SBSQ HOSP IP/OBS MODERATE 35: CPT | Performed by: UROLOGY

## 2023-03-01 PROCEDURE — 6360000002 HC RX W HCPCS

## 2023-03-01 RX ORDER — CEFDINIR 300 MG/1
300 CAPSULE ORAL 2 TIMES DAILY
Qty: 14 CAPSULE | Refills: 0 | Status: SHIPPED | OUTPATIENT
Start: 2023-03-01 | End: 2023-03-01 | Stop reason: SDUPTHER

## 2023-03-01 RX ORDER — CEFDINIR 300 MG/1
300 CAPSULE ORAL 2 TIMES DAILY
Qty: 20 CAPSULE | Refills: 0 | Status: SHIPPED | OUTPATIENT
Start: 2023-03-01 | End: 2023-03-11

## 2023-03-01 RX ORDER — FLUCONAZOLE 100 MG/1
100 TABLET ORAL DAILY
Qty: 10 TABLET | Refills: 0 | Status: SHIPPED | OUTPATIENT
Start: 2023-03-01 | End: 2023-03-11

## 2023-03-01 RX ADMIN — FLUCONAZOLE IN SODIUM CHLORIDE 200 MG: 2 INJECTION, SOLUTION INTRAVENOUS at 08:34

## 2023-03-01 RX ADMIN — SODIUM CHLORIDE, POTASSIUM CHLORIDE, SODIUM LACTATE AND CALCIUM CHLORIDE: 600; 310; 30; 20 INJECTION, SOLUTION INTRAVENOUS at 12:21

## 2023-03-01 RX ADMIN — ENOXAPARIN SODIUM 40 MG: 100 INJECTION SUBCUTANEOUS at 08:20

## 2023-03-01 RX ADMIN — SODIUM CHLORIDE, POTASSIUM CHLORIDE, SODIUM LACTATE AND CALCIUM CHLORIDE: 600; 310; 30; 20 INJECTION, SOLUTION INTRAVENOUS at 03:32

## 2023-03-01 RX ADMIN — PIPERACILLIN AND TAZOBACTAM 3375 MG: 3; .375 INJECTION, POWDER, FOR SOLUTION INTRAVENOUS at 08:33

## 2023-03-01 ASSESSMENT — PAIN SCALES - GENERAL
PAINLEVEL_OUTOF10: 5
PAINLEVEL_OUTOF10: 0

## 2023-03-01 NOTE — PROGRESS NOTES
Shyam Lares, APRN - CNP  Urology Progress Note    Subjective: Kaz Morris is a 23 y.o. female. s/p CYSTOSCOPY RIGHT STENT EXCHANGE on 2/26/2023 - 2/27/2023    His/Her current Diet is: ADULT DIET; Regular. Since the previous note, the patient reports the following:  No acute issues overnight. No fevers or chills. No nausea or vomiting. No chest pain or shortness of breath. No calf pain. Still with pain  Ambulating. Tolerating PO Diet. Plan:  Right works scheduled 3/9, she is aware  ID on case, appreciate recs  Bcx neg, Ux +ecoli  Cont abx until surgery  On Diflucan and Zosyn  Ok for d/c from Urology standpoint  Urology signin off, call with questions    Vitals and Labs:  Patient Vitals for the past 24 hrs:   BP Temp Temp src Pulse Resp SpO2 Weight   03/01/23 0815 121/80 97.7 °F (36.5 °C) Oral 80 18 97 % --   03/01/23 0551 -- -- -- -- -- -- 195 lb 9.6 oz (88.7 kg)   03/01/23 0315 114/72 97.5 °F (36.4 °C) Oral 78 18 93 % --   03/01/23 0026 -- -- -- -- 18 -- --   02/28/23 2345 109/73 97.6 °F (36.4 °C) Oral 77 18 96 % --   02/28/23 1945 107/60 97.7 °F (36.5 °C) Oral 68 18 94 % --   02/28/23 1525 (!) 126/94 98.3 °F (36.8 °C) Oral 86 18 96 % --   02/28/23 1222 117/68 98.5 °F (36.9 °C) Oral 87 18 95 % --       I/O last 3 completed shifts:  In: -   Out: 2200 [Urine:2200]    Recent Labs     02/27/23  0558 02/28/23  1121 03/01/23  0559   WBC 17.8* 11.3* 11.8*   HGB 11.5* 11.9* 12.9   HCT 36.3* 38.3 40.2   MCV 91.4 91.6 89.9    275 306       Recent Labs     02/27/23  0558 02/28/23  0559 03/01/23  0559    137 136   K 3.7 4.6 4.4    103 104   CO2 26 23 23   PHOS  --  1.5* 3.2   BUN 14 15 13   CREATININE 1.2 1.0 0.9         No results for input(s): COLORU, PHUR, LABCAST, WBCUA, RBCUA, MUCUS, TRICHOMONAS, YEAST, BACTERIA, CLARITYU, SPECGRAV, LEUKOCYTESUR, UROBILINOGEN, BILIRUBINUR, BLOODU in the last 72 hours.     Invalid input(s): NITRATE, GLUCOSEUKETONESUAMORPHOUS      Physical Exam:  No acute distress. Awake, alert and oriented. Neck is supple. Throat pain  Regular rate and rhythm. Normal peripheral pulses  No accessory muscles of inspiration. Symmetric chest rise  Abdomen soft, non-tender, non-distended. +flank pain. No calf pain. Minimal/no edema in bilateral lower extremities. Skin is warm, dry  Psych: mood, affect normal    Additional Lab/Culture results:     Imaging Reviewed:     DARI Esquivel CNP independently reviewed the images and verified the radiology reports from:    CTA CHEST W WO CONTRAST    Result Date: 2/26/2023  PROCEDURE: CTA CHEST W WO CONTRAST CLINICAL INFORMATION: Chest pain, tachycardia, hypoxia, rule out pulmonary embolism. COMPARISON: CT chest 1/27/2023. TECHNIQUE: 3 mm axial images were obtained through the chest after the administration of IV contrast.  A non-contrast localizer was obtained. 3D reconstructions were performed on the scanner to include MIP coronal and sagittal images through the chest. Isovue was the intravenous contrast utilized. All CT scans at this facility use dose modulation, iterative reconstruction, and/or weight-based dosing when appropriate to reduce radiation dose to as low as reasonably achievable. FINDINGS: There is adequate opacification of the pulmonary arterial system. No pulmonary emboli are present. The aorta is within acceptable limits. The heart size is normal. There is no pericardial or pleural effusion. There is no mediastinal, axillary or hilar adenopathy. As seen on the prior chest CT, there are faint areas of groundglass opacities throughout both lungs. Some of these appear interstitial. This is unchanged. This catheter present some inflammation versus mild pulmonary edema. Infection is not excluded. In the medial right breast, there is a 2 cm round lesion. This is seen on image 59. This is indeterminate. In the upper abdomen, there are surgical clips from a cholecystectomy.  There is a  shunt in the soft tissues of the left chest wall. There are no suspicious osseous lesions. 1. No pulmonary emboli. 2. Faint interstitial opacities throughout both lungs. These were also present previously. This can represent infiltrates. An infiltrating process or pulmonary edema are also considerations. 3. 2 cm well-circumscribed right breast mass. An elective targeted right breast ultrasound is recommended. **This report has been created using voice recognition software. It may contain minor errors which are inherent in voice recognition technology. ** Final report electronically signed by Dr. John Paul Panda on 2/26/2023 8:30 AM    US RENAL COMPLETE    Result Date: 2/26/2023  PROCEDURE: US LIVER, US RENAL COMPLETE CLINICAL INFORMATION: 78-year-old female with right upper quadrant abdominal pain. Right-sided ureteral stent. COMPARISON: Correlation is made with CT scan 2/26/2023 7:32 AM TECHNIQUE: Multiplanar grayscale and color flow sonographic images were obtained of the upper abdomen including the liver and bilateral kidneys. FINDINGS: RIGHT KIDNEY - 10.20 x 6.587 x 7.73 cm Resistive Index - 0.68 Cortical Thickness - 1.53 cm LEFT KIDNEY - 11.94 x 5.68 x 5.63 cm Resistive Index - 0.64 Cortical Thickness - 1.53 cm URINARY BLADDER Pre-Void - 91 mL Post-Void - / mL Liver- 15.87 cm CBD- 1.11 cm The liver is of normal echogenicity. There are no liver masses. There is no intrahepatic biliary ductal dilatation. There is normal color flow in the main portal vein, right and left portal veins, hepatic artery, inferior vena cava and all 3 hepatic veins. The pancreatic head and body are within normal limits. The tail is not well seen due to overlying bowel gas. The gallbladder is surgically absent. The common bile duct is prominent measuring 1.1 cm. This is likely compensatory in nature post cholecystectomy. There is normal echogenicity of the renal parenchyma bilaterally. There is no thinning of the renal cortex.  There is mild prominence of the right renal pelvis without sarthak hydronephrosis. No stones are noted. No mass lesions are noted. The urinary bladder is incompletely distended. No gross abnormalities are evident. There is a right-sided ureteral stent extending into the bladder. The left ureteral jet is visualized. No suspicious findings. **This report has been created using voice recognition software. It may contain minor errors which are inherent in voice recognition technology. ** Final report electronically signed by Dr Ashely Boswell on 2/26/2023 1:59 PM    CT ABDOMEN PELVIS W IV CONTRAST Additional Contrast? None    Result Date: 2/26/2023  PROCEDURE: CT ABDOMEN PELVIS W IV CONTRAST CLINICAL INFORMATION: Hx of right ureter stent . Nausea and vomiting. COMPARISON: CT abdomen pelvis 1/27/2023. TECHNIQUE: Axial 5 mm CT images were obtained through the abdomen and pelvis after the administration of intravenous contrast. Coronal and sagittal reconstructions were obtained. All CT scans at this facility use dose modulation, iterative reconstruction, and/or weight-based dosing when appropriate to reduce radiation dose to as low as reasonably achievable. FINDINGS: The visualized aspects of the lung bases are clear. The base of the heart is within appropriate limits. The liver is normal. The spleen is normal. The adrenals and pancreas are normal. There are surgical clips were prior cholecystectomy. Tubing from a  shunt is seen. The tip is along the surface of the liver. There is no associated fluid collection. The right kidney is significantly larger than the left. This appears slightly larger than on the prior exam. There is decreased enhancement of the cortex of the right kidney compared to the left. There is blurring of the corticomedullary junction. There is a hazy appearance to the fat in the hilum. There is a right ureteral stent in place. This appears stable in position.  The left kidney appears normal. No abnormalities of the small bowel loops are noted. The IVC and aorta are of normal caliber. There is no adenopathy. The urinary bladder is relatively empty. There is no pelvic free fluid. The colon is within normal limits. There is a normal appendix in the right lower quadrant. The uterus and adnexa appear normal. No suspicious osseous lesions are identified. 1. Abnormal appearing right kidney. This appears large and there is decreased enhancement of the right kidney compared to the left. These findings are suggestive of pyelonephritis. Other etiologies are not excluded. An infiltrating process is also a consideration. 2. Normal-appearing right ureteral stent. **This report has been created using voice recognition software. It may contain minor errors which are inherent in voice recognition technology. ** Final report electronically signed by Dr. Karime Calvillo on 2/26/2023 8:37 AM    US LIVER    Result Date: 2/26/2023  PROCEDURE: US LIVER, US RENAL COMPLETE CLINICAL INFORMATION: 75-year-old female with right upper quadrant abdominal pain. Right-sided ureteral stent. COMPARISON: Correlation is made with CT scan 2/26/2023 7:32 AM TECHNIQUE: Multiplanar grayscale and color flow sonographic images were obtained of the upper abdomen including the liver and bilateral kidneys. FINDINGS: RIGHT KIDNEY - 10.20 x 6.587 x 7.73 cm Resistive Index - 0.68 Cortical Thickness - 1.53 cm LEFT KIDNEY - 11.94 x 5.68 x 5.63 cm Resistive Index - 0.64 Cortical Thickness - 1.53 cm URINARY BLADDER Pre-Void - 91 mL Post-Void - / mL Liver- 15.87 cm CBD- 1.11 cm The liver is of normal echogenicity. There are no liver masses. There is no intrahepatic biliary ductal dilatation. There is normal color flow in the main portal vein, right and left portal veins, hepatic artery, inferior vena cava and all 3 hepatic veins. The pancreatic head and body are within normal limits. The tail is not well seen due to overlying bowel gas. The gallbladder is surgically absent. The common bile duct is prominent measuring 1.1 cm. This is likely compensatory in nature post cholecystectomy. There is normal echogenicity of the renal parenchyma bilaterally. There is no thinning of the renal cortex. There is mild prominence of the right renal pelvis without sarthak hydronephrosis. No stones are noted. No mass lesions are noted. The urinary bladder is incompletely distended. No gross abnormalities are evident. There is a right-sided ureteral stent extending into the bladder.  The left ureteral jet is visualized.     No suspicious findings. **This report has been created using voice recognition software. It may contain minor errors which are inherent in voice recognition technology.** Final report electronically signed by Dr Domenic Finnegan on 2/26/2023 1:59 PM      Impression:    Patient Active Problem List   Diagnosis    Abdominal pain    Nephrolithiasis    Obstruction of right ureter    Sepsis (HCC)    Pyelonephritis    Tachycardia    Hypotension due to hypovolemia    Diarrhea    Hypomagnesemia    Hyponatremia    Complicated UTI (urinary tract infection)       s/p CYSTOSCOPY RIGHT STENT EXCHANGE on 2/26/2023 - 2/27/2023    DARI Rose - CNP  9:47 AM 3/1/2023

## 2023-03-01 NOTE — PROGRESS NOTES
Assessment: This was an attempted spiritual care visit as a part of rounds. Patient sleeping at this time. Plan of Care:   team will remain available for patient/family, PRN. [address], [place]. [address] [place] [address] Tj Baptiste, [address]  [phone]

## 2023-03-01 NOTE — PROGRESS NOTES
Educated on discharge instructions, medications, and follow up appointments. No further questions or concerns voiced. Discharged to home with mother.

## 2023-03-01 NOTE — PLAN OF CARE
Problem: Discharge Planning  Goal: Discharge to home or other facility with appropriate resources  Outcome: Progressing  Note: Pt will discharge home when appropriate. Problem: Pain  Goal: Verbalizes/displays adequate comfort level or baseline comfort level  Outcome: Progressing  Note: Pt denies pain on my shift. Non pharmaceutical interventions like ice and repositioning offered before pain medications. Will continue to assess. Problem: Safety - Adult  Goal: Free from fall injury  Outcome: Progressing  Note: Fall precaution in place. Bed alarm/chair alarm. Bed locked in lowest position. Fall band applied if applicable. Call light and overhead table within reach. Will continue to monitor. Problem: Cardiovascular - Adult  Goal: Maintains optimal cardiac output and hemodynamic stability  Outcome: Progressing  Note: Pt vitals are within normal range. Cap refills and pulses all within normal ranges on all extremities. Will continue to assess. Goal: Absence of cardiac dysrhythmias or at baseline  Outcome: Progressing  Note: ABSENCE OF CARDIAC DYSRHYTHMIAS ON MY SHIFT      Problem: Gastrointestinal - Adult  Goal: Minimal or absence of nausea and vomiting  Outcome: Progressing  Note: Absence of nausea and vomiting on my shift     Goal: Maintains adequate nutritional intake  Outcome: Progressing  Note: Optimal nutrition status       Problem: Genitourinary - Adult  Goal: Absence of urinary retention  Outcome: Progressing  Note: Absence of urinary retention      Problem: Infection - Adult  Goal: Absence of infection at discharge  Outcome: Progressing  Note: Pt shows no new signs or symptoms of infection on my shift. Will continue to assess. Pt verbalizes understanding of care plan. Pt contributes to goal settings.

## 2023-03-01 NOTE — TELEPHONE ENCOUNTER
Per Michelle Done at [facility], [facility] no pre certification is required for CPT code 17799. Call Ref# [id number].

## 2023-03-01 NOTE — CARE COORDINATION
3/1/23, 1:55 PM EST    Patient goals/plan/ treatment preferences discussed by  and . Patient goals/plan/ treatment preferences reviewed with patient/ family. Patient/ family verbalize understanding of discharge plan and are in agreement with goal/plan/treatment preferences. Understanding was demonstrated using the teach back method. AVS provided by RN at time of discharge, which includes all necessary medical information pertaining to the patients current course of illness, treatment, post-discharge goals of care, and treatment preferences. Services At/After Discharge: None      Plans return home with mother as prior to admission.

## 2023-03-01 NOTE — DISCHARGE SUMMARY
Hospitalist Discharge Summary        Patient: Matt Em  YOB: 2000  MRN: [id number]   Acct: [de-identified]    Primary Care Physician: DARI Mendoza CNP    Admit date  2/26/2023    Discharge date: No discharge date for patient encounter. Chief Complaint on presentation :-  UTI    Discharge Assessment and Plan:-   Sepsis (POA) due to pyelonephritis, resolved: 4/4 SIRS criteria POA. Hypotensive, qSOFA 2/3. Lactic acid 1.7 (POA). Signs of organ failure with SAM, Cr 1.5. CTA chest negative for PE, showing faint interstitial opacities throughout both lungs, as noted on previous CTA chest dated 1/27/23. Could represent infiltrates or infiltrating process such as pulmonary edema. 3/1: Urine culture growing E. Coli, sensitive to Zosyn and Cefdinir Pt remains afebrile. WBC at 11.8, Lactic Acid 1.0, BP stabilizing. SAM resolved. On IV Zosyn and diflucan per ID. Will transition to PO cefdinir and diflucan upon discharge x10 days per ID recommendations. Complicated UTI/candiduria, pyelonephritis s/p right ureteral stent exchange 2/27: Urology consulted and following. Urine culture growing E. Coli, sensitive to Zosyn. CT A/P with abnormal right kidney suggestive of pyelonephritis. Continue Zosyn and diflucan per ID. To continue with PO cefdinir and diflucan upon discharge. Scheduled for Urology f/u with right ureteroscopy laser lithotripsy and possible right ureteral stent exchange vs removal 3/9 outpatient. SAM, resolved: Secondary to volume depletion and renal hypoperfusion. Cr 1, down from 1.5 (POA). Continuous IVF. Avoid nephrotoxic agents and renally dose medications. 3/1: Cr stable at 0.9 and 1.0. Continue to monitor. AGMA, resolved: Anion gap stable at 9.0  2 cm right breast mass: Noted on CTA chest.  Recommend elective targeted right breast ultrasound. A follow-up outpatient with PCP for this.   Elec abnormalities:  Hypomagnesemia, resolved: Mg stable at 1.9 and 2. 0  Hypophosphatemia, resolved: Phos stable at 3.2  Hyponatremia, resolved: Na stable at 136 and 137  Direct hyperbilirubinemia: Bilirubin 0.5, down from 1 (POA). Ictotest positive (POA). Liver ultrasound noting CBD prominent measuring 1.1 cm however likely compensatory in nature postcholecystectomy. No suspicious findings. Total bili trending down. Hypotension, improved: Secondary to pyelonephritis, s/p IVF boluses. Continuous IVFs. Tele. 3/1: -120/70s, stable. Pt taking in oral fluids. Continue to monitor. Sinus tachycardia, resolved: Secondary to sepsis, s/p IVF boluses. Continuous IVFs. 3/1: HR stable 60-90s. Pt denies CP or palpitations. Hx of hydrocephalus s/p  shunt  Hx of premature birth, developmentally delayed  Obesity: BMI 35.78    Initial H and P and Hospital course:-  \"Per HPI documented 2/26/2023: \"Patient is a 20-year-old female past medical history of kidney stones with right ureteral stent, ADHD and learning difficulty who was admitted to [address] after presenting to the ED with nausea vomiting abdominal pain. Patient was previously admitted in January 2023 after she was found to have an obstructing stone and pyelonephritis for which she was treated with antibiotics and underwent stent placement with Dr. Yumi Fraser. She was doing fine until yesterday developed acute abdominal pain with nausea and some vomiting, nonbloody. In the ED patient met SIRS criteria and was found to have a UTI as well as possible right pyelonephritis, was admitted for further management and care. S/p sepsis fluids, initiated on empiric antibiotics with Rocephin. \"     2/27/2023: Resume care patient today. Patient resting in bed, nontoxic in appearance, no apparent distress. Afebrile, satting 97% on room air, hemodynamically stable. Patient stating that she is having some 3/10 abdominal pain after receiving morphine but states that it was a 10/10. States that she has some nausea, has had dry heaving. States she feels hot. Patient evaluated by urology services who is planning for stent exchange today. Continue IV antibiotics at this time, urine culture growing enteric gram-negative bacilli. Continue current IV antibiotics, tailor pending sensitivity results. ID consulted by admitting provider, appreciate recs     2/28/23: Afebrile, hemodynamically stable. Patient is status post cystoscopy with right ureteral stent exchange yesterday. Currently patient is complaining of 9/10 right-sided abdominal pain. States that she feels nauseous this morning but was able to eat her breakfast.  States that she also has a sore throat from her procedure. Work on pain control, continue IV Zosyn per ID. Urine culture growing E. coli, pending sensitivity report. Can discharge next 24 to 48 hours pending pain control and sensitivity report. Denies chest pains, shortness of breath at rest, dyspnea on exertion, vomiting, diarrhea, fever, chills. Day of Discharge:  3/1: Pt is reclined in bed, she is pleasant in conversation. Day 2 s/p cystoscopy with right ureteral stent exchange. She denies abdominal pain, nausea, vomiting, fever or chills, dysuria, hematuria or flank pain. She reports a good appetite and has been drinking several Pepsis. Pt encouraged to consider drinking more water and limiting soda intake. She states she feels ready for discharge. See above for full details. Pt is a 22 yo female with PMH of kidney stones with right ureteral stent placed 1/26 who presents with nausea, vomiting and abdominal pain. In the ED pt met SIRS criteria, found to have UTI and possible right pyelonephritis. Pt was previously admitted in January for obstructing stone and pyelonephritis, underwent stent placement. CT A/P with large right kidney, decreased enhancement, findings suggestive of pyelonephritis. ID was consulted for abx recommendations. US renal without suspicious findings.  Pt was seen by urology and taken for cystoscopy with right ureteral stent exchange 2/27. SAM, AGMA, hypomagnesemia, hypophosphatemia and hyponatremia all resolved with IVF. WBC trending down at 11.8. Lactic acid 1.0. Urine culture growing E. Coli, sensitive to Zosyn. Pt placed on IV Zosyn and diflucan per ID. To transition to PO cefdinir and diflucan after discharge x10 days. Pt reports significant decrease in nausea and abdominal pain. Continues to remain afebrile. She states she is comfortable returning home. To follow up with PCP in next few days to address 2 cm right breast mass found on CTA chest as well as outpatient urology surgery on 3/9 for right ureteroscopy laser lithotripsy with possible right ureteral stent exchange vs removal. VSS stable. Physical Exam:-  Vitals:   Patient Vitals for the past 24 hrs:   BP Temp Temp src Pulse Resp SpO2 Weight   03/01/23 1307 (!) 125/97 98.8 °F (37.1 °C) Oral 90 18 96 % --   03/01/23 0815 121/80 97.7 °F (36.5 °C) Oral 80 18 97 % --   03/01/23 0551 -- -- -- -- -- -- 195 lb 9.6 oz (88.7 kg)   03/01/23 0315 114/72 97.5 °F (36.4 °C) Oral 78 18 93 % --   03/01/23 0026 -- -- -- -- 18 -- --   02/28/23 2345 109/73 97.6 °F (36.4 °C) Oral 77 18 96 % --   02/28/23 1945 107/60 97.7 °F (36.5 °C) Oral 68 18 94 % --   02/28/23 1525 (!) 126/94 98.3 °F (36.8 °C) Oral 86 18 96 % --     Weight:   Weight: 195 lb 9.6 oz (88.7 kg)   24 hour intake/output:     Intake/Output Summary (Last 24 hours) at 3/1/2023 1316  Last data filed at 3/1/2023 1307  Gross per 24 hour   Intake 400 ml   Output 1350 ml   Net -950 ml       General appearance: No apparent distress, appears stated age and cooperative. HEENT: Normal cephalic, atraumatic without obvious deformity. Pupils equal, round, and reactive to light. Extra ocular muscles intact. Conjunctivae/corneas clear. Neck: Supple, with full range of motion. No jugular venous distention. Trachea midline. Respiratory:  Normal respiratory effort.  Clear to auscultation, bilaterally without Rales/Wheezes/Rhonchi. Cardiovascular: Regular rate and rhythm with normal S1/S2 without murmurs, rubs or gallops. Abdomen: Soft, right sided tenderness to palpation, non-distended with normal bowel sounds. No CVA tenderness. Musculoskeletal:  No clubbing, cyanosis or edema bilaterally. Skin: Skin color, texture, turgor normal.  No rashes or lesions. Neurologic:  Neurovascularly intact without any focal sensory/motor deficits.  Cranial nerves: II-XII intact, grossly non-focal.  Psychiatric: Alert and oriented, thought content appropriate, normal insight  Capillary Refill: Brisk,< 3 seconds   Peripheral Pulses: +2 palpable, equal bilaterally       Discharge Medications:-      Medication List        START taking these medications      cefdinir 300 MG capsule  Commonly known as: OMNICEF  Take 1 capsule by mouth 2 times daily for 10 days     fluconazole 100 MG tablet  Commonly known as: Diflucan  Take 1 tablet by mouth daily for 10 days            ASK your doctor about these medications      ondansetron 4 MG disintegrating tablet  Commonly known as: ZOFRAN-ODT  Take 1 tablet by mouth every 8 hours as needed for Nausea or Vomiting               Where to Get Your Medications        These medications were sent to [facility] , [address] [facility], [facility] [address]      Phone: [phone]   cefdinir 300 MG capsule  fluconazole 100 MG tablet          Labs :-  Recent Results (from the past 72 hour(s))   Comprehensive Metabolic Panel w/ Reflex to MG    Collection Time: 02/27/23  5:58 AM   Result Value Ref Range    Glucose 107 70 - 108 mg/dL    Creatinine 1.2 0.4 - 1.2 mg/dL    BUN 14 7 - 22 mg/dL    Sodium 137 135 - 145 meq/L    Potassium reflex Magnesium 3.7 3.5 - 5.2 meq/L    Chloride 102 98 - 111 meq/L    CO2 26 23 - 33 meq/L    Calcium 8.0 (L) 8.5 - 10.5 mg/dL    AST 14 5 - 40 U/L    Alkaline Phosphatase 102 38 - 126 U/L    Total Protein 5.4 (L) 6.1 - 8.0 g/dL    Albumin 2.8 (L) 3.5 - 5.1 g/dL    Total Bilirubin 1.1 0.3 - 1.2 mg/dL    ALT 24 11 - 66 U/L   Lactic Acid    Collection Time: 02/27/23  5:58 AM   Result Value Ref Range    Lactic Acid 1.0 0.5 - 2.0 mmol/L   CBC with Auto Differential    Collection Time: 02/27/23  5:58 AM   Result Value Ref Range    WBC 17.8 (H) 4.8 - 10.8 thou/mm3    RBC 3.97 (L) 4.20 - 5.40 mill/mm3    Hemoglobin 11.5 (L) 12.0 - 16.0 gm/dl    Hematocrit 36.3 (L) 37.0 - 47.0 %    MCV 91.4 81.0 - 99.0 fL    MCH 29.0 26.0 - 33.0 pg    MCHC 31.7 (L) 32.2 - 35.5 gm/dl    RDW-CV 14.6 (H) 11.5 - 14.5 %    RDW-SD 49.2 (H) 35.0 - 45.0 fL    Platelets 699 500 - 741 thou/mm3    MPV 11.8 9.4 - 12.4 fL    Seg Neutrophils 84.3 %    Lymphocytes 7.4 %    Monocytes 7.1 %    Eosinophils 0.2 %    Basophils 0.2 %    Immature Granulocytes 0.8 %    Segs Absolute 15.0 (H) 1.8 - 7.7 thou/mm3    Lymphocytes Absolute 1.3 1.0 - 4.8 thou/mm3    Monocytes Absolute 1.3 0.4 - 1.3 thou/mm3    Eosinophils Absolute 0.0 0.0 - 0.4 thou/mm3    Basophils Absolute 0.0 0.0 - 0.1 thou/mm3    Immature Grans (Abs) 0.15 (H) 0.00 - 0.07 thou/mm3    nRBC 0 /100 wbc   Lipid Panel    Collection Time: 02/27/23  5:58 AM   Result Value Ref Range    Cholesterol, Total 101 100 - 199 mg/dL    Triglycerides 85 0 - 199 mg/dL    HDL 39 mg/dL    LDL Calculated 45 mg/dL   Uric Acid    Collection Time: 02/27/23  5:58 AM   Result Value Ref Range    Uric Acid 2.9 2.4 - 5.7 mg/dL   Bilirubin, Direct    Collection Time: 02/27/23  5:58 AM   Result Value Ref Range    Bilirubin, Direct 0.7 (H) 0.0 - 0.3 mg/dL   Anion Gap    Collection Time: 02/27/23  5:58 AM   Result Value Ref Range    Anion Gap 9.0 8.0 - 16.0 meq/L   Glomerular Filtration Rate, Estimated    Collection Time: 02/27/23  5:58 AM   Result Value Ref Range    Est, Glom Filt Rate >60 >60 ml/min/1.73m2   Magnesium    Collection Time: 02/28/23  5:59 AM   Result Value Ref Range    Magnesium 2.0 1.6 - 2.4 mg/dL Phosphorus    Collection Time: 02/28/23  5:59 AM   Result Value Ref Range    Phosphorus 1.5 (L) 2.4 - 4.7 mg/dL   Comprehensive Metabolic Panel w/ Reflex to MG    Collection Time: 02/28/23  5:59 AM   Result Value Ref Range    Glucose 198 (H) 70 - 108 mg/dL    Creatinine 1.0 0.4 - 1.2 mg/dL    BUN 15 7 - 22 mg/dL    Sodium 137 135 - 145 meq/L    Potassium reflex Magnesium 4.6 3.5 - 5.2 meq/L    Chloride 103 98 - 111 meq/L    CO2 23 23 - 33 meq/L    Calcium 8.8 8.5 - 10.5 mg/dL    AST 10 5 - 40 U/L    Alkaline Phosphatase 98 38 - 126 U/L    Total Protein 5.9 (L) 6.1 - 8.0 g/dL    Albumin 2.9 (L) 3.5 - 5.1 g/dL    Total Bilirubin 0.5 0.3 - 1.2 mg/dL    ALT 20 11 - 66 U/L   Anion Gap    Collection Time: 02/28/23  5:59 AM   Result Value Ref Range    Anion Gap 11.0 8.0 - 16.0 meq/L   Glomerular Filtration Rate, Estimated    Collection Time: 02/28/23  5:59 AM   Result Value Ref Range    Est, Glom Filt Rate >60 >60 ml/min/1.73m2   CBC with Auto Differential    Collection Time: 02/28/23 11:21 AM   Result Value Ref Range    WBC 11.3 (H) 4.8 - 10.8 thou/mm3    RBC 4.18 (L) 4.20 - 5.40 mill/mm3    Hemoglobin 11.9 (L) 12.0 - 16.0 gm/dl    Hematocrit 38.3 37.0 - 47.0 %    MCV 91.6 81.0 - 99.0 fL    MCH 28.5 26.0 - 33.0 pg    MCHC 31.1 (L) 32.2 - 35.5 gm/dl    RDW-CV 14.6 (H) 11.5 - 14.5 %    RDW-SD 49.3 (H) 35.0 - 45.0 fL    Platelets 309 057 - 152 thou/mm3    MPV 12.5 (H) 9.4 - 12.4 fL    Seg Neutrophils 86.5 %    Lymphocytes 9.8 %    Monocytes 2.8 %    Eosinophils 0.0 %    Basophils 0.2 %    Immature Granulocytes 0.7 %    Segs Absolute 9.8 (H) 1.8 - 7.7 thou/mm3    Lymphocytes Absolute 1.1 1.0 - 4.8 thou/mm3    Monocytes Absolute 0.3 (L) 0.4 - 1.3 thou/mm3    Eosinophils Absolute 0.0 0.0 - 0.4 thou/mm3    Basophils Absolute 0.0 0.0 - 0.1 thou/mm3    Immature Grans (Abs) 0.08 (H) 0.00 - 0.07 thou/mm3    nRBC 0 /100 wbc   Magnesium    Collection Time: 03/01/23  5:59 AM   Result Value Ref Range    Magnesium 1.9 1.6 - 2.4 mg/dL Phosphorus    Collection Time: 03/01/23  5:59 AM   Result Value Ref Range    Phosphorus 3.2 2.4 - 4.7 mg/dL   Comprehensive Metabolic Panel w/ Reflex to MG    Collection Time: 03/01/23  5:59 AM   Result Value Ref Range    Glucose 141 (H) 70 - 108 mg/dL    Creatinine 0.9 0.4 - 1.2 mg/dL    BUN 13 7 - 22 mg/dL    Sodium 136 135 - 145 meq/L    Potassium reflex Magnesium 4.4 3.5 - 5.2 meq/L    Chloride 104 98 - 111 meq/L    CO2 23 23 - 33 meq/L    Calcium 8.6 8.5 - 10.5 mg/dL    AST 9 5 - 40 U/L    Alkaline Phosphatase 83 38 - 126 U/L    Total Protein 5.8 (L) 6.1 - 8.0 g/dL    Albumin 2.9 (L) 3.5 - 5.1 g/dL    Total Bilirubin 0.3 0.3 - 1.2 mg/dL    ALT 15 11 - 66 U/L   CBC with Auto Differential    Collection Time: 03/01/23  5:59 AM   Result Value Ref Range    WBC 11.8 (H) 4.8 - 10.8 thou/mm3    RBC 4.47 4.20 - 5.40 mill/mm3    Hemoglobin 12.9 12.0 - 16.0 gm/dl    Hematocrit 40.2 37.0 - 47.0 %    MCV 89.9 81.0 - 99.0 fL    MCH 28.9 26.0 - 33.0 pg    MCHC 32.1 (L) 32.2 - 35.5 gm/dl    RDW-CV 14.6 (H) 11.5 - 14.5 %    RDW-SD 48.1 (H) 35.0 - 45.0 fL    Platelets 230 157 - 505 thou/mm3    MPV 12.2 9.4 - 12.4 fL    Seg Neutrophils 79.1 %    Lymphocytes 15.5 %    Monocytes 4.1 %    Eosinophils 0.0 %    Basophils 0.2 %    Immature Granulocytes 1.1 %    Segs Absolute 9.3 (H) 1.8 - 7.7 thou/mm3    Lymphocytes Absolute 1.8 1.0 - 4.8 thou/mm3    Monocytes Absolute 0.5 0.4 - 1.3 thou/mm3    Eosinophils Absolute 0.0 0.0 - 0.4 thou/mm3    Basophils Absolute 0.0 0.0 - 0.1 thou/mm3    Immature Grans (Abs) 0.13 (H) 0.00 - 0.07 thou/mm3    nRBC 0 /100 wbc   Anion Gap    Collection Time: 03/01/23  5:59 AM   Result Value Ref Range    Anion Gap 9.0 8.0 - 16.0 meq/L   Glomerular Filtration Rate, Estimated    Collection Time: 03/01/23  5:59 AM   Result Value Ref Range    Est, Glom Filt Rate >60 >60 ml/min/1.73m2        Microbiology:    Blood culture #1:   Lab Results   Component Value Date/Time    BC No growth 24 hours. No growth 48 hours. 02/26/2023 06:19 AM       Blood culture #2:No results found for: Nakul Cartwright    Organism:  No results found for: LABGRAM    MRSA culture only:No results found for: [facility]    Urine culture:   Lab Results   Component Value Date/Time    LABURIN  02/16/2023 03:11 PM     No growth-preliminary Growth of Contaminants. The mixture of organisms present are not a common cause of urinary tract infections and probably represent skin patricia or distal urethral patricia. Lab Results   Component Value Date/Time    ORG Escherichia coli 02/26/2023 07:00 AM        Respiratory culture: No results found for: CULTRESP    Aerobic and Anaerobic :  No results found for: LABAERO  No results found for: LABANAE    Urinalysis:      Lab Results   Component Value Date/Time    NITRU POSITIVE 02/26/2023 07:00 AM    WBCUA > 200 02/26/2023 07:00 AM    BACTERIA MODERATE 02/26/2023 07:00 AM    RBCUA 3-5 02/26/2023 07:00 AM    BLOODU LARGE 02/26/2023 07:00 AM    SPECGRAV 1.009 01/26/2023 08:55 AM    GLUCOSEU NEGATIVE 02/26/2023 07:00 AM       Radiology:-  CTA CHEST W WO CONTRAST    Result Date: 2/26/2023  PROCEDURE: CTA CHEST W WO CONTRAST CLINICAL INFORMATION: Chest pain, tachycardia, hypoxia, rule out pulmonary embolism. COMPARISON: CT chest 1/27/2023. TECHNIQUE: 3 mm axial images were obtained through the chest after the administration of IV contrast.  A non-contrast localizer was obtained. 3D reconstructions were performed on the scanner to include MIP coronal and sagittal images through the chest. Isovue was the intravenous contrast utilized. All CT scans at this facility use dose modulation, iterative reconstruction, and/or weight-based dosing when appropriate to reduce radiation dose to as low as reasonably achievable. FINDINGS: There is adequate opacification of the pulmonary arterial system. No pulmonary emboli are present. The aorta is within acceptable limits. The heart size is normal. There is no pericardial or pleural effusion.   There is no mediastinal, axillary or hilar adenopathy. As seen on the prior chest CT, there are faint areas of groundglass opacities throughout both lungs. Some of these appear interstitial. This is unchanged. This catheter present some inflammation versus mild pulmonary edema. Infection is not excluded. In the medial right breast, there is a 2 cm round lesion. This is seen on image 59. This is indeterminate. In the upper abdomen, there are surgical clips from a cholecystectomy. There is a  shunt in the soft tissues of the left chest wall. There are no suspicious osseous lesions. 1. No pulmonary emboli. 2. Faint interstitial opacities throughout both lungs. These were also present previously. This can represent infiltrates. An infiltrating process or pulmonary edema are also considerations. 3. 2 cm well-circumscribed right breast mass. An elective targeted right breast ultrasound is recommended. **This report has been created using voice recognition software. It may contain minor errors which are inherent in voice recognition technology. ** Final report electronically signed by Dr. Kyle Bloch on 2/26/2023 8:30 AM    US RENAL COMPLETE    Result Date: 2/26/2023  PROCEDURE: US LIVER, US RENAL COMPLETE CLINICAL INFORMATION: 58-year-old female with right upper quadrant abdominal pain. Right-sided ureteral stent. COMPARISON: Correlation is made with CT scan 2/26/2023 7:32 AM TECHNIQUE: Multiplanar grayscale and color flow sonographic images were obtained of the upper abdomen including the liver and bilateral kidneys. FINDINGS: RIGHT KIDNEY - 10.20 x 6.587 x 7.73 cm Resistive Index - 0.68 Cortical Thickness - 1.53 cm LEFT KIDNEY - 11.94 x 5.68 x 5.63 cm Resistive Index - 0.64 Cortical Thickness - 1.53 cm URINARY BLADDER Pre-Void - 91 mL Post-Void - / mL Liver- 15.87 cm CBD- 1.11 cm The liver is of normal echogenicity. There are no liver masses. There is no intrahepatic biliary ductal dilatation.  There is normal color flow in the main portal vein, right and left portal veins, hepatic artery, inferior vena cava and all 3 hepatic veins. The pancreatic head and body are within normal limits. The tail is not well seen due to overlying bowel gas. The gallbladder is surgically absent. The common bile duct is prominent measuring 1.1 cm. This is likely compensatory in nature post cholecystectomy. There is normal echogenicity of the renal parenchyma bilaterally. There is no thinning of the renal cortex. There is mild prominence of the right renal pelvis without sarthak hydronephrosis. No stones are noted. No mass lesions are noted. The urinary bladder is incompletely distended. No gross abnormalities are evident. There is a right-sided ureteral stent extending into the bladder. The left ureteral jet is visualized. No suspicious findings. **This report has been created using voice recognition software. It may contain minor errors which are inherent in voice recognition technology. ** Final report electronically signed by Dr Rosemary Rodrigues on 2/26/2023 1:59 PM    CT ABDOMEN PELVIS W IV CONTRAST Additional Contrast? None    Result Date: 2/26/2023  PROCEDURE: CT ABDOMEN PELVIS W IV CONTRAST CLINICAL INFORMATION: Hx of right ureter stent . Nausea and vomiting. COMPARISON: CT abdomen pelvis 1/27/2023. TECHNIQUE: Axial 5 mm CT images were obtained through the abdomen and pelvis after the administration of intravenous contrast. Coronal and sagittal reconstructions were obtained. All CT scans at this facility use dose modulation, iterative reconstruction, and/or weight-based dosing when appropriate to reduce radiation dose to as low as reasonably achievable. FINDINGS: The visualized aspects of the lung bases are clear. The base of the heart is within appropriate limits. The liver is normal. The spleen is normal. The adrenals and pancreas are normal. There are surgical clips were prior cholecystectomy. Tubing from a  shunt is seen.  The tip is along the surface of the liver. There is no associated fluid collection.   The right kidney is significantly larger than the left. This appears slightly larger than on the prior exam. There is decreased enhancement of the cortex of the right kidney compared to the left. There is blurring of the corticomedullary junction. There is a hazy appearance to the fat in the hilum. There is a right ureteral stent in place. This appears stable in position. The left kidney appears normal. No abnormalities of the small bowel loops are noted.  The IVC and aorta are of normal caliber. There is no adenopathy.  The urinary bladder is relatively empty. There is no pelvic free fluid.  The colon is within normal limits.  There is a normal appendix in the right lower quadrant. The uterus and adnexa appear normal. No suspicious osseous lesions are identified.      1. Abnormal appearing right kidney. This appears large and there is decreased enhancement of the right kidney compared to the left. These findings are suggestive of pyelonephritis. Other etiologies are not excluded. An infiltrating process is also a consideration. 2. Normal-appearing right ureteral stent. **This report has been created using voice recognition software. It may contain minor errors which are inherent in voice recognition technology.** Final report electronically signed by Dr. Martina Larose on 2/26/2023 8:37 AM    US LIVER    Result Date: 2/26/2023  PROCEDURE: US LIVER, US RENAL COMPLETE CLINICAL INFORMATION: 22-year-old female with right upper quadrant abdominal pain. Right-sided ureteral stent. COMPARISON: Correlation is made with CT scan 2/26/2023 7:32 AM TECHNIQUE: Multiplanar grayscale and color flow sonographic images were obtained of the upper abdomen including the liver and bilateral kidneys. FINDINGS: RIGHT KIDNEY - 10.20 x 6.587 x 7.73 cm Resistive Index - 0.68 Cortical Thickness - 1.53 cm LEFT KIDNEY - 11.94 x 5.68 x 5.63 cm Resistive Index - 0.64  Cortical Thickness - 1.53 cm URINARY BLADDER Pre-Void - 91 mL Post-Void - / mL Liver- 15.87 cm CBD- 1.11 cm The liver is of normal echogenicity. There are no liver masses. There is no intrahepatic biliary ductal dilatation. There is normal color flow in the main portal vein, right and left portal veins, hepatic artery, inferior vena cava and all 3 hepatic veins. The pancreatic head and body are within normal limits. The tail is not well seen due to overlying bowel gas. The gallbladder is surgically absent. The common bile duct is prominent measuring 1.1 cm. This is likely compensatory in nature post cholecystectomy. There is normal echogenicity of the renal parenchyma bilaterally. There is no thinning of the renal cortex. There is mild prominence of the right renal pelvis without sarthak hydronephrosis. No stones are noted. No mass lesions are noted. The urinary bladder is incompletely distended. No gross abnormalities are evident. There is a right-sided ureteral stent extending into the bladder.  The left ureteral jet is visualized.     No suspicious findings. **This report has been created using voice recognition software. It may contain minor errors which are inherent in voice recognition technology.** Final report electronically signed by Dr Domenic Finnegan on 2/26/2023 1:59 PM       Follow-up scheduled after discharge :-    in the next few days with DARI Moore CNP  3/9/23 with outpatient urology     Consultations during this hospital stay:-  [] NONE [] Cardiology  [] Nephrology  [] Hemo onco   [] GI   [x] ID  [] Endocrine  [] Pulm    [] Neuro    [] Psych   [x] Urology  [] ENT   [] G SURGERY   []Ortho    []CV surg    [] Palliative  [] Hospice [] Pain management   []    []TCU   [] PT/OT  OTHERS:-     Disposition: home  Condition at Discharge: Stable    Time Spent:- 40 minutes    Electronically signed by Carla Burks PA-C on 3/1/23 at 1:16 PM EST   Discharging Hospitalist

## 2023-03-03 LAB
BACTERIA BLD AEROBE CULT: NORMAL
BACTERIA BLD AEROBE CULT: NORMAL

## 2023-03-07 NOTE — PROGRESS NOTES
PAT Call Date: called 2/23  Surgery Date: 3/9    Surgeon: Samantha Chavira   Surgery:   Zelphia [place], stent  Is patient from a nursing home? No   Any Isolation Precautions? No   Any Pacemaker or ICD? No If YES, has it been checked recently and where? Has the rep been notified? No     On Snapboard?  No  2/27/23 cysto w/ stent, discharged from hospital 3/1   Hard Copy on Chart  In EPIC Pending/Notes   Consent -   Within 30 days; signed, dated & timed by patient and physician     [x] On Arrival     [] Blood    Additional Consent Needs:     H&P - Within 30 days    [] Physician To Do     [] H&P Update - If H&P is older then 24 hours    Clearance -  Medical, Cardiac, Pulmonary, etc.       Orders - Signed and Dated    Copy Sent to Pharm []  3/1  [] Physician To Do    Labs - Within 3 months   3/1          2/26  [x] CBC -ok   [x] BMP-ok   [x] GFR-ok   [] INR    [] PTT    [x] Urine +   [] Liver Enzymes    [] Kidney Function    [] MRSA Nasal   [] MSSA      Others:    Radiology Studies-   Within 1 year  1/30  [x] Portable Chest X-Ray-improved   [] MRI    [] CT    EKG -   Within 1 year, unless hx of HTN  2/27 No change   Cardiac Workup -   Stress Test, Echo, Cath within 18 months      1/27  [] Cath                                [] Stress Test                      [x] Echo 55%   [] Holter Monitor    [] PAOLO

## 2023-03-08 RX ORDER — SODIUM CHLORIDE 9 MG/ML
INJECTION, SOLUTION INTRAVENOUS PRN
Status: CANCELLED | OUTPATIENT
Start: 2023-03-08

## 2023-03-08 RX ORDER — SODIUM CHLORIDE 0.9 % (FLUSH) 0.9 %
5-40 SYRINGE (ML) INJECTION PRN
Status: CANCELLED | OUTPATIENT
Start: 2023-03-08

## 2023-03-08 RX ORDER — SODIUM CHLORIDE 0.9 % (FLUSH) 0.9 %
5-40 SYRINGE (ML) INJECTION EVERY 12 HOURS SCHEDULED
Status: CANCELLED | OUTPATIENT
Start: 2023-03-08

## 2023-03-09 ENCOUNTER — ANESTHESIA EVENT (OUTPATIENT)
Dept: OPERATING ROOM | Age: 23
End: 2023-03-09
Payer: MEDICAID

## 2023-03-09 ENCOUNTER — HOSPITAL ENCOUNTER (OUTPATIENT)
Age: 23
Setting detail: OUTPATIENT SURGERY
Discharge: HOME | End: 2023-03-09
Attending: UROLOGY | Admitting: UROLOGY
Payer: MEDICAID

## 2023-03-09 ENCOUNTER — ANESTHESIA (OUTPATIENT)
Dept: OPERATING ROOM | Age: 23
End: 2023-03-09
Payer: MEDICAID

## 2023-03-09 VITALS
TEMPERATURE: 97.2 F | HEIGHT: 65 IN | BODY MASS INDEX: 32.46 KG/M2 | HEART RATE: 91 BPM | WEIGHT: 194.8 LBS | DIASTOLIC BLOOD PRESSURE: 69 MMHG | SYSTOLIC BLOOD PRESSURE: 120 MMHG | RESPIRATION RATE: 16 BRPM | OXYGEN SATURATION: 95 %

## 2023-03-09 DIAGNOSIS — N20.0 NEPHROLITHIASIS: Primary | ICD-10-CM

## 2023-03-09 PROCEDURE — 7100000010 HC PHASE II RECOVERY - FIRST 15 MIN: Performed by: UROLOGY

## 2023-03-09 PROCEDURE — 6360000002 HC RX W HCPCS: Performed by: UROLOGY

## 2023-03-09 PROCEDURE — 3700000000 HC ANESTHESIA ATTENDED CARE: Performed by: UROLOGY

## 2023-03-09 PROCEDURE — 3600000002 HC SURGERY LEVEL 2 BASE: Performed by: UROLOGY

## 2023-03-09 PROCEDURE — 2500000003 HC RX 250 WO HCPCS

## 2023-03-09 PROCEDURE — C1758 CATHETER, URETERAL: HCPCS | Performed by: UROLOGY

## 2023-03-09 PROCEDURE — 2580000003 HC RX 258: Performed by: UROLOGY

## 2023-03-09 PROCEDURE — 7100000011 HC PHASE II RECOVERY - ADDTL 15 MIN: Performed by: UROLOGY

## 2023-03-09 PROCEDURE — C2617 STENT, NON-COR, TEM W/O DEL: HCPCS | Performed by: UROLOGY

## 2023-03-09 PROCEDURE — 2709999900 HC NON-CHARGEABLE SUPPLY: Performed by: UROLOGY

## 2023-03-09 PROCEDURE — 6360000002 HC RX W HCPCS

## 2023-03-09 PROCEDURE — 7100000000 HC PACU RECOVERY - FIRST 15 MIN: Performed by: UROLOGY

## 2023-03-09 PROCEDURE — C1769 GUIDE WIRE: HCPCS | Performed by: UROLOGY

## 2023-03-09 PROCEDURE — 3700000001 HC ADD 15 MINUTES (ANESTHESIA): Performed by: UROLOGY

## 2023-03-09 PROCEDURE — 3600000012 HC SURGERY LEVEL 2 ADDTL 15MIN: Performed by: UROLOGY

## 2023-03-09 PROCEDURE — 7100000001 HC PACU RECOVERY - ADDTL 15 MIN: Performed by: UROLOGY

## 2023-03-09 PROCEDURE — 6370000000 HC RX 637 (ALT 250 FOR IP): Performed by: UROLOGY

## 2023-03-09 PROCEDURE — 6370000000 HC RX 637 (ALT 250 FOR IP): Performed by: ANESTHESIOLOGY

## 2023-03-09 PROCEDURE — C1747 HC ENDOSCOPE, SINGLE, URINARY TRACT: HCPCS | Performed by: UROLOGY

## 2023-03-09 DEVICE — URETERAL STENT
Type: IMPLANTABLE DEVICE | Site: URETER | Status: FUNCTIONAL
Brand: PERCUFLEX™ PLUS

## 2023-03-09 RX ORDER — PROPOFOL 10 MG/ML
INJECTION, EMULSION INTRAVENOUS PRN
Status: DISCONTINUED | OUTPATIENT
Start: 2023-03-09 | End: 2023-03-09 | Stop reason: SDUPTHER

## 2023-03-09 RX ORDER — SCOLOPAMINE TRANSDERMAL SYSTEM 1 MG/1
1 PATCH, EXTENDED RELEASE TRANSDERMAL ONCE
Status: DISCONTINUED | OUTPATIENT
Start: 2023-03-09 | End: 2023-03-09 | Stop reason: HOSPADM

## 2023-03-09 RX ORDER — ONDANSETRON 2 MG/ML
INJECTION INTRAMUSCULAR; INTRAVENOUS PRN
Status: DISCONTINUED | OUTPATIENT
Start: 2023-03-09 | End: 2023-03-09 | Stop reason: SDUPTHER

## 2023-03-09 RX ORDER — SODIUM CHLORIDE 0.9 % (FLUSH) 0.9 %
5-40 SYRINGE (ML) INJECTION PRN
Status: DISCONTINUED | OUTPATIENT
Start: 2023-03-09 | End: 2023-03-09 | Stop reason: HOSPADM

## 2023-03-09 RX ORDER — HYDROCODONE BITARTRATE AND ACETAMINOPHEN 5; 325 MG/1; MG/1
1 TABLET ORAL EVERY 6 HOURS PRN
Qty: 12 TABLET | Refills: 0 | Status: SHIPPED | OUTPATIENT
Start: 2023-03-09 | End: 2023-03-12

## 2023-03-09 RX ORDER — HYDROCODONE BITARTRATE AND ACETAMINOPHEN 5; 325 MG/1; MG/1
1 TABLET ORAL ONCE
Status: COMPLETED | OUTPATIENT
Start: 2023-03-09 | End: 2023-03-09

## 2023-03-09 RX ORDER — ROCURONIUM BROMIDE 10 MG/ML
INJECTION, SOLUTION INTRAVENOUS PRN
Status: DISCONTINUED | OUTPATIENT
Start: 2023-03-09 | End: 2023-03-09 | Stop reason: SDUPTHER

## 2023-03-09 RX ORDER — SODIUM CHLORIDE 0.9 % (FLUSH) 0.9 %
5-40 SYRINGE (ML) INJECTION EVERY 12 HOURS SCHEDULED
Status: DISCONTINUED | OUTPATIENT
Start: 2023-03-09 | End: 2023-03-09 | Stop reason: HOSPADM

## 2023-03-09 RX ORDER — LABETALOL HYDROCHLORIDE 5 MG/ML
10 INJECTION, SOLUTION INTRAVENOUS
Status: DISCONTINUED | OUTPATIENT
Start: 2023-03-09 | End: 2023-03-09 | Stop reason: HOSPADM

## 2023-03-09 RX ORDER — NITROFURANTOIN 25; 75 MG/1; MG/1
100 CAPSULE ORAL 2 TIMES DAILY
Qty: 10 CAPSULE | Refills: 0 | Status: SHIPPED | OUTPATIENT
Start: 2023-03-09 | End: 2023-03-14

## 2023-03-09 RX ORDER — PHENAZOPYRIDINE HYDROCHLORIDE 200 MG/1
200 TABLET, FILM COATED ORAL 3 TIMES DAILY PRN
Qty: 9 TABLET | Refills: 0 | Status: SHIPPED | OUTPATIENT
Start: 2023-03-09

## 2023-03-09 RX ORDER — KETAMINE HCL IN NACL, ISO-OSM 100MG/10ML
SYRINGE (ML) INJECTION PRN
Status: DISCONTINUED | OUTPATIENT
Start: 2023-03-09 | End: 2023-03-09 | Stop reason: SDUPTHER

## 2023-03-09 RX ORDER — DEXAMETHASONE SODIUM PHOSPHATE 10 MG/ML
INJECTION, EMULSION INTRAMUSCULAR; INTRAVENOUS PRN
Status: DISCONTINUED | OUTPATIENT
Start: 2023-03-09 | End: 2023-03-09 | Stop reason: SDUPTHER

## 2023-03-09 RX ORDER — ONDANSETRON 2 MG/ML
4 INJECTION INTRAMUSCULAR; INTRAVENOUS
Status: DISCONTINUED | OUTPATIENT
Start: 2023-03-09 | End: 2023-03-09 | Stop reason: HOSPADM

## 2023-03-09 RX ORDER — MIDAZOLAM HYDROCHLORIDE 1 MG/ML
INJECTION INTRAMUSCULAR; INTRAVENOUS PRN
Status: DISCONTINUED | OUTPATIENT
Start: 2023-03-09 | End: 2023-03-09 | Stop reason: SDUPTHER

## 2023-03-09 RX ORDER — SODIUM CHLORIDE 9 MG/ML
INJECTION, SOLUTION INTRAVENOUS PRN
Status: DISCONTINUED | OUTPATIENT
Start: 2023-03-09 | End: 2023-03-09 | Stop reason: HOSPADM

## 2023-03-09 RX ORDER — HYDRALAZINE HYDROCHLORIDE 20 MG/ML
10 INJECTION INTRAMUSCULAR; INTRAVENOUS
Status: DISCONTINUED | OUTPATIENT
Start: 2023-03-09 | End: 2023-03-09 | Stop reason: HOSPADM

## 2023-03-09 RX ADMIN — ROCURONIUM BROMIDE 50 MG: 10 INJECTION, SOLUTION INTRAVENOUS at 13:07

## 2023-03-09 RX ADMIN — DEXAMETHASONE SODIUM PHOSPHATE 10 MG: 10 INJECTION, EMULSION INTRAMUSCULAR; INTRAVENOUS at 13:07

## 2023-03-09 RX ADMIN — PROPOFOL 80 MG: 10 INJECTION, EMULSION INTRAVENOUS at 13:07

## 2023-03-09 RX ADMIN — Medication 100 MG: at 13:07

## 2023-03-09 RX ADMIN — HYDROCODONE BITARTRATE AND ACETAMINOPHEN 1 TABLET: 5; 325 TABLET ORAL at 15:26

## 2023-03-09 RX ADMIN — Medication 25 MG: at 13:20

## 2023-03-09 RX ADMIN — MIDAZOLAM 2 MG: 1 INJECTION INTRAMUSCULAR; INTRAVENOUS at 13:02

## 2023-03-09 RX ADMIN — SODIUM CHLORIDE: 9 INJECTION, SOLUTION INTRAVENOUS at 11:56

## 2023-03-09 RX ADMIN — ONDANSETRON 4 MG: 2 INJECTION INTRAMUSCULAR; INTRAVENOUS at 13:29

## 2023-03-09 RX ADMIN — Medication 25 MG: at 13:07

## 2023-03-09 RX ADMIN — Medication 2000 MG: at 13:13

## 2023-03-09 RX ADMIN — SUGAMMADEX 200 MG: 100 INJECTION, SOLUTION INTRAVENOUS at 13:29

## 2023-03-09 ASSESSMENT — PAIN SCALES - GENERAL
PAINLEVEL_OUTOF10: 10
PAINLEVEL_OUTOF10: 0
PAINLEVEL_OUTOF10: 10
PAINLEVEL_OUTOF10: 0

## 2023-03-09 ASSESSMENT — PAIN DESCRIPTION - ORIENTATION: ORIENTATION: RIGHT;LOWER

## 2023-03-09 ASSESSMENT — PAIN DESCRIPTION - LOCATION: LOCATION: ABDOMEN

## 2023-03-09 ASSESSMENT — PAIN - FUNCTIONAL ASSESSMENT: PAIN_FUNCTIONAL_ASSESSMENT: 0-10

## 2023-03-09 ASSESSMENT — PAIN SCALES - WONG BAKER: WONGBAKER_NUMERICALRESPONSE: 0

## 2023-03-09 NOTE — ANESTHESIA POSTPROCEDURE EVALUATION
Department of Anesthesiology  Postprocedure Note    Patient: Darlin Gold  MRN: [id number]  YOB: 2000  Date of evaluation: 3/9/2023      Procedure Summary     Date: 03/09/23 Room / Location: [facility]    Anesthesia Start: 1302 Anesthesia Stop: 2301    Procedure: Cystoscopy Right Ureteroscopy and Right Ureteral stent exchange (Right) Diagnosis:       Kidney stone      (Kidney stone [N20.0])    Surgeons: Bettye Redding MD Responsible Provider: Leida Pettit MD    Anesthesia Type: General ASA Status: 2          Anesthesia Type: General    Ck Phase I: Ck Score: 9    Ck Phase II:        Anesthesia Post Evaluation    Patient location during evaluation: PACU  Patient participation: complete - patient participated  Level of consciousness: awake and alert  Airway patency: patent  Nausea & Vomiting: no nausea  Complications: no  Cardiovascular status: blood pressure returned to baseline and hemodynamically stable  Respiratory status: acceptable and spontaneous ventilation  Hydration status: euvolemic

## 2023-03-09 NOTE — H&P
History and Physical    Patient:  Petra Acuña  MRN: [id number]  YOB: 2000    CHIEF COMPLAINT:  kidney stones    HISTORY OF PRESENT ILLNESS:   The patient is a 23 y.o. female who presents with as above, here for surgery    Patient's old records, notes and chart reviewed and summarized above. Past Medical History:    Past Medical History:   Diagnosis Date    ADHD (attention deficit hyperactivity disorder)     Learning difficulty     pt states only able to read at 2nd grade level    Premature baby     27 weeks       Past Surgical History:    Past Surgical History:   Procedure Laterality Date    ACHILLES TENDON SURGERY      CHOLECYSTECTOMY, LAPAROSCOPIC N/A 7/16/2017    LAPAROSCOPY; OPEN CHOLECYSTECTOMY performed by Jodi Bustos MD at [facility] Right 2/27/2023    CYSTOSCOPY RIGHT STENT EXCHANGE performed by Izaiah William MD at [address] [place] [address]      revision  shunt 1999    URETER SURGERY Right 1/26/2023    Cystoscopy Right Stent Insertion performed by Sina Dudley MD at [address] [place] [address]       Medications Prior to Admission:    Prior to Admission medications    Medication Sig Start Date End Date Taking? Authorizing Provider   fluconazole (DIFLUCAN) 100 MG tablet Take 1 tablet by mouth daily for 10 days 3/1/23 3/11/23  Guru Patel PA-C   cefdinir (OMNICEF) 300 MG capsule Take 1 capsule by mouth 2 times daily for 10 days 3/1/23 3/11/23  Joe Burks PA-C   ondansetron (ZOFRAN-ODT) 4 MG disintegrating tablet Take 1 tablet by mouth every 8 hours as needed for Nausea or Vomiting  Patient not taking: No sig reported 2/1/23   Dylon Thayer PA-C       Allergies:  Patient has no known allergies.     Social History:    Social History     Socioeconomic History    Marital status: Single     Spouse name: Not on file    Number of children: Not on file    Years of education: Not on file    Highest education level: Not on file Occupational History    Not on file   Tobacco Use    Smoking status: Never    Smokeless tobacco: Never   Vaping Use    Vaping Use: Every day    Substances: Flavoring    Devices: Refillable tank   Substance and Sexual Activity    Alcohol use: Yes     Comment: occasionally    Drug use: No    Sexual activity: Yes     Partners: Male   Other Topics Concern    Not on file   Social History Narrative    Not on file     Social Determinants of Health     Financial Resource Strain: Not on file   Food Insecurity: Not on file   Transportation Needs: Not on file   Physical Activity: Not on file   Stress: Not on file   Social Connections: Not on file   Intimate Partner Violence: Not on file   Housing Stability: Not on file       Family History:    Family History   Problem Relation Age of Onset    Cancer Mother     COPD Father     Asthma Brother     COPD Brother        REVIEW OF SYSTEMS:  Constitutional: negative  Eyes: negative  Respiratory: negative  Cardiovascular: negative  Gastrointestinal: negative  Genitourinary: see HPI  Musculoskeletal: negative  Skin: negative   Neurological: negative  Hematological/Lymphatic: negative  Psychological: negative    Physical Exam:      Patient Vitals for the past 24 hrs:   BP Temp Temp src Pulse Resp SpO2 Height Weight   03/09/23 1116 129/80 97.4 °F (36.3 °C) Temporal 90 16 97 % 5' 4.57\" (1.64 m) 194 lb 12.8 oz (88.4 kg)     Constitutional: Patient in no acute distress; Neuro: alert and oriented to person place and time. Psych: Mood and affect normal.  Skin: Normal  Lungs: Respiratory effort normal, CTA  Cardiovascular:  Normal peripheral pulses; no murmur  Abdomen: Soft, non-tender, non-distended with no CVA, flank pain, hepatosplenomegaly or hernia. Kidneys normal.  Bladder non-tender and not distended. LABS:   No results for input(s): WBC, HGB, HCT, MCV, PLT in the last 72 hours. No results for input(s): NA, K, CL, CO2, PHOS, BUN, CREATININE, CA in the last 72 hours.   No results found for: PSA        Urinalysis: No results for input(s): COLORU, PHUR, LABCAST, WBCUA, RBCUA, MUCUS, TRICHOMONAS, YEAST, BACTERIA, CLARITYU, SPECGRAV, LEUKOCYTESUR, UROBILINOGEN, BILIRUBINUR, BLOODU in the last 72 hours. Invalid input(s): NITRATE, GLUCOSEUKETONESUAMORPHOUS     -----------------------------------------------------------------      Assessment and Plan   Impression:    Patient Active Problem List   Diagnosis    Abdominal pain    Nephrolithiasis    Obstruction of right ureter    Sepsis ([address]    Pyelonephritis    Tachycardia    Hypotension due to hypovolemia    Diarrhea    Hypomagnesemia    Hyponatremia    Complicated UTI (urinary tract infection)       Plan:   Risks: I discussed all the risks, benefits, alternatives and possible complications or surgery as well as expectations and post-op recovery.       Consent obtained; cystoscopy right ureteroscopy, possible HLL, possible stent removal versus exchange in OR today    Bruno Hernandez M.D, MD  11:36 AM 3/9/2023

## 2023-03-09 NOTE — DISCHARGE INSTRUCTIONS
Ureteral Stent Information  -Ureteral stents are hollow tubes with multiple side holes that coils in your kidney and proceeds down your ureter where it then coils in your bladder                              -Most stents are temporary, if you have a chronic  stent it will need to be exchanged regularly. If left in longer than recommended, serious   complications of severe encrustation, UTI,   and/or obstruction and potential loss of kidney can occur    -Ureteral stents are used to relieve ureteral obstruction and promote ureteral healing following surgery    Why you may have a Stent:  -Ureteral obstruction secondary to kidney stones, ureteral stenosis, ureteral anastomosis, or preventative (prior to ESWL for large stone)    Stent Symptoms  You may not have any symptoms at all   Irritating voiding symptoms; urgency, frequency, feeling of incomplete bladder emptying, burning, blood in urine for up to 1-3 days, straining (all likely due to stent irritating the bladder)  Suprapubic pressure/discomfort  Flank pain    Stent Management  -You will likely be discharged home with:  Pain medication- take as needed for severe pain  Anticholinergic (Oxybutynin, Urispas)- These medications will decrease ureteral and bladder spasms that are likely causing discomfort  Flomax-relaxes ureter  Antibiotic-treats or prevents infection-take medication until completed  *Take all medications as prescribed    *If pain is severe take pain pill, take a hot shower for 10-15 minutes, and then apply heating pad to affected area      -Diet  Normal diet,         Increase water intake!!!! Try to consume at least 2 liters of water a day  AVOID Caffeine-this can make symptoms worse!  -Activity  Avoid strenuous activity!!   Rest when tired  Do not drive if taking narcotic pain medicine  *Call provider if developing symptoms of infection; fever, chills, pus in your urine, new onset body aches    Follow-up is Key part of treatment and safety!!!                               Cystoscopy stent placement with string:  You may see blood in the urine after the procedure.  This should resolve over the next couple days.  Please stay hydrated.  You may see intermittent blood in the urine while the stent is in place.  This is expected.  You may experience flank pain, and/or frequency/urgency of urination while the stent is in place.      Pt ok to discharge home in good condition  No heavy lifting, >10 lbs for today  Pt should avoid strenuous activity for today  Pt should walk moderately at home  Pt ok to shower   Pt may resume diet as tolerated  Pt should take Rx as directed  No driving while on narcotics  Please call attending physician or hospital  with questions  Call or Present to ED if fever (> 101F), intractable nausea vomiting or pain.  Rx sent    Pt should follow up with Dr. Lucas, in 6-8 weeks, call to confirm appointment    Pt should Pull stent in 3 days.  There may be some pain associated with the stent removal, which is usually self limiting.  We suggest using the pain medication prescribed for you and a nonsteroidal anti-inflammatory such as Ibuprofen, if you are able to take this medication, to control symptoms.  Take Ibuprofen as directed for 24 hrs after stent pull.  Please stay hydrated.  Please call with questions.

## 2023-03-09 NOTE — PROGRESS NOTES
Patient admitted to [facility] room 8 with mom at bedside. Bed in low position side rails up call light in reach. Patient denies questions at this time.

## 2023-03-09 NOTE — PROGRESS NOTES
1335: Pt arrives to pacu, responds to verbal stimuli and quickly drifts back to sleep. Pt on 4L NC, respirations easy and unlabored. Strings noted attached with steris. VSS  1350: Pt resting off and on with eyes closed, easily awakens to voice.  Continues to deny pain  1355: Pt wakes up intermittently crying for her mother, patient okayed for early discharge back to [facility]

## 2023-03-09 NOTE — ANESTHESIA PRE PROCEDURE
Department of Anesthesiology  Preprocedure Note       Name:  Tirso Leyva   Age:  23 y. o.  :  2000                                          MRN:  [id number]         Date:  3/9/2023      Surgeon: Maria R Thompson):  Frankey Poster, MD    Procedure: Procedure(s):  Cystoscopy Right Ureteroscopy Laser Lithotripsy Basket Retrieval of Stone Fragments possible Right Ureteral stent exchange vs removal    Medications prior to admission:   Prior to Admission medications    Medication Sig Start Date End Date Taking? Authorizing Provider   fluconazole (DIFLUCAN) 100 MG tablet Take 1 tablet by mouth daily for 10 days 3/1/23 3/11/23  Kathya Roque PA-C   cefdinir (OMNICEF) 300 MG capsule Take 1 capsule by mouth 2 times daily for 10 days 3/1/23 3/11/23  Tara Burks PA-C   ondansetron (ZOFRAN-ODT) 4 MG disintegrating tablet Take 1 tablet by mouth every 8 hours as needed for Nausea or Vomiting  Patient not taking: No sig reported 23   Hima Mcdermott PA-C       Current medications:    No current facility-administered medications for this visit. No current outpatient medications on file.      Facility-Administered Medications Ordered in Other Visits   Medication Dose Route Frequency Provider Last Rate Last Admin    sodium chloride flush 0.9 % injection 5-40 mL  5-40 mL IntraVENous 2 times per day Frankey Poster, MD        sodium chloride flush 0.9 % injection 5-40 mL  5-40 mL IntraVENous PRN Frankey Poster, MD        0.9 % sodium chloride infusion   IntraVENous PRN Frankey Poster, MD        ceFAZolin (ANCEF) 2000 mg in 0.9% sodium chloride 50 mL IVPB  2,000 mg IntraVENous On Call to [address] [place] [address], MD           Allergies:  No Known Allergies    Problem List:    Patient Active Problem List   Diagnosis Code    Abdominal pain R10.9    Nephrolithiasis N20.0    Obstruction of right ureter N13.5    Sepsis ([address] A41.9    Pyelonephritis N12    Tachycardia R00.0    Hypotension due to hypovolemia I95.89, E86.1  Diarrhea R19.7    Hypomagnesemia E83.42    Hyponatremia D93.4    Complicated UTI (urinary tract infection) N39.0       Past Medical History:        Diagnosis Date    ADHD (attention deficit hyperactivity disorder)     Learning difficulty     pt states only able to read at 2nd grade level    Premature baby     27 weeks       Past Surgical History:        Procedure Laterality Date    ACHILLES TENDON SURGERY      CHOLECYSTECTOMY, LAPAROSCOPIC N/A 7/16/2017    LAPAROSCOPY; OPEN CHOLECYSTECTOMY performed by Altaf Brenner MD at [facility] Right 2/27/2023    CYSTOSCOPY RIGHT STENT EXCHANGE performed by Elijah Santos MD at [address] [place] [address]      revision  shunt 1999    URETER SURGERY Right 1/26/2023    Cystoscopy Right Stent Insertion performed by Kenny Alexandre MD at [address]         Social History:    Social History     Tobacco Use    Smoking status: Never    Smokeless tobacco: Never   Substance Use Topics    Alcohol use: Yes     Comment: occasionally                                Counseling given: Not Answered      Vital Signs (Current): There were no vitals filed for this visit.                                            BP Readings from Last 3 Encounters:   03/09/23 129/80   03/01/23 (!) 125/97   02/01/23 112/64       NPO Status:                                                                                 BMI:   Wt Readings from Last 3 Encounters:   03/09/23 194 lb 12.8 oz (88.4 kg)   03/01/23 195 lb 9.6 oz (88.7 kg)   02/16/23 195 lb (88.5 kg)     There is no height or weight on file to calculate BMI.    CBC:   Lab Results   Component Value Date/Time    WBC 11.8 03/01/2023 05:59 AM    RBC 4.47 03/01/2023 05:59 AM    HGB 12.9 03/01/2023 05:59 AM    HCT 40.2 03/01/2023 05:59 AM    MCV 89.9 03/01/2023 05:59 AM    RDW 13.8 07/18/2017 09:10 AM     03/01/2023 05:59 AM       CMP:   Lab Results   Component Value Date/Time    NA 136 03/01/2023 05:59 AM    K 4.4 03/01/2023 05:59 AM     03/01/2023 05:59 AM    CO2 23 03/01/2023 05:59 AM    BUN 13 03/01/2023 05:59 AM    CREATININE 0.9 03/01/2023 05:59 AM    LABGLOM >60 03/01/2023 05:59 AM    GLUCOSE 141 03/01/2023 05:59 AM    PROT 5.8 03/01/2023 05:59 AM    CALCIUM 8.6 03/01/2023 05:59 AM    BILITOT 0.3 03/01/2023 05:59 AM    ALKPHOS 83 03/01/2023 05:59 AM    AST 9 03/01/2023 05:59 AM    ALT 15 03/01/2023 05:59 AM       POC Tests: No results for input(s): POCGLU, POCNA, POCK, POCCL, POCBUN, POCHEMO, POCHCT in the last 72 hours. Coags:   Lab Results   Component Value Date/Time    INR 1.33 01/27/2023 01:14 PM    APTT 22.9 01/27/2023 07:04 PM       HCG (If Applicable):   Lab Results   Component Value Date    PREGSERUM NEGATIVE 02/26/2023        ABGs: No results found for: PHART, PO2ART, JKE5OHB, POI1NFG, BEART, P6WLMGQJ     Type & Screen (If Applicable):  No results found for: LABABO, LABRH    Drug/Infectious Status (If Applicable):  No results found for: HIV, HEPCAB    COVID-19 Screening (If Applicable):   Lab Results   Component Value Date/Time    COVID19 NOT DETECTED 02/26/2023 06:15 AM    COVID19 Not Detected 01/27/2023 12:46 PM           Anesthesia Evaluation  Patient summary reviewed no history of anesthetic complications:   Airway: Mallampati: III  TM distance: >3 FB   Neck ROM: full  Mouth opening: > = 3 FB   Dental:          Pulmonary:Negative Pulmonary ROS and normal exam              Patient did not smoke on day of surgery. Cardiovascular:  Exercise tolerance: good (>4 METS),                     Neuro/Psych:   (+) psychiatric history:            GI/Hepatic/Renal: Neg GI/Hepatic/Renal ROS            Endo/Other: Negative Endo/Other ROS             Pt had no PAT visit       Abdominal:   (+) obese,           Vascular: Other Findings:             Anesthesia Plan      general     ASA 2       Induction: intravenous and rapid sequence.     MIPS: Postoperative opioids intended and Prophylactic antiemetics administered. Anesthetic plan and risks discussed with patient. Plan discussed with CRNA.                     Lilo Yen MD   3/9/2023

## 2023-03-09 NOTE — PROGRESS NOTES
Back to [facility] from PACU Alert. Mother at bedside. Crackers and ice water given. Side rails up bed in low position.  Call light in reach

## 2023-03-13 ENCOUNTER — TELEPHONE (OUTPATIENT)
Dept: UROLOGY | Age: 23
End: 2023-03-13

## 2023-03-13 DIAGNOSIS — N20.0 NEPHROLITHIASIS: Primary | ICD-10-CM

## 2023-03-13 NOTE — TELEPHONE ENCOUNTER
Patient scheduled for YARELIS  at [facility] on 4/20/23.     Order mailed with instructions or given to the patient in the office

## 2023-03-21 NOTE — OP NOTE
this.  Alongside the wire we advanced the ureteroscope into the ureter. We surveyed the entire length of the ureter and we did not see any stones. We withdrew the ureteroscope and visualized the entire ureter. No stone fragments were identified. No damage to the ureter was identified. At this time, over the remaining glidewire, we placed a 6x26 double J ureteral stent in the usual fashion, and we noted appropriate placement in the upper collecting system using fluoroscopy. There was a good curl noted in the bladder. We decided to leave a string at the end of the stent, which was attached with benzoin and steri-strips. The patient's bladder was drained and removed the scope and the procedure was subsequently terminated.     Plan:  Discharge home in good condition  The patient can pull the stent in 3-5 days     Electronically signed by Reinier Villanueva M.D, MD on 3/21/2023 at 8:28 AM

## 2023-04-21 ENCOUNTER — HOSPITAL ENCOUNTER (OUTPATIENT)
Dept: ULTRASOUND IMAGING | Age: 23
Discharge: HOME | End: 2023-04-21
Payer: MEDICAID

## 2023-04-21 DIAGNOSIS — N20.0 NEPHROLITHIASIS: ICD-10-CM

## 2023-04-21 PROCEDURE — 76775 US EXAM ABDO BACK WALL LIM: CPT

## 2023-05-05 ENCOUNTER — OFFICE VISIT (OUTPATIENT)
Dept: UROLOGY | Age: 23
End: 2023-05-05
Payer: MEDICAID

## 2023-05-05 VITALS
WEIGHT: 194 LBS | SYSTOLIC BLOOD PRESSURE: 118 MMHG | BODY MASS INDEX: 32.32 KG/M2 | DIASTOLIC BLOOD PRESSURE: 72 MMHG | HEIGHT: 65 IN

## 2023-05-05 DIAGNOSIS — Z87.448 HISTORY OF PYELONEPHRITIS: Primary | ICD-10-CM

## 2023-05-05 DIAGNOSIS — N20.0 NEPHROLITHIASIS: ICD-10-CM

## 2023-05-05 PROCEDURE — 99214 OFFICE O/P EST MOD 30 MIN: CPT

## 2023-05-05 NOTE — PATIENT INSTRUCTIONS
-KUB and renal US in 6 months  -Give urine sample to lab as soon as possible    -Follow up in 6 months   -Call with questions, comments, or concerns. I recommend going to the ED for further evaluation if you develop fever, chills, nausea, vomiting, chest pain, SOB, or calf pain.

## 2023-05-05 NOTE — PROGRESS NOTES
Patient:  Emilie Arvizu  YOB: 2000  Date: 5/5/2023    HISTORY OF PRESENT ILLNESS:   The patient is a 23 y.o. female who presents today for evaluation of the following problems:       5/5/23             The patient is a 23 y.o. female with significant past medical history of  kidney stones, ADHD, and learning difficulties. Patient was admitted in January 2023 after she was found to have an obstructing stone and pyelonephritis for which she was treated with antibiotics and underwent stent placement with Dr. Andrew Plata. In the ED as of 2/2023, the patient met SIRS criteria and was found to have a UTI, as well as possible right pyelonephritis, and was admitted for further management and care. Her stent was subsequently exchanged in the OR with Dr. Rodolfo Beaver. She was also treated with Rocephin and Diflucan. Ms. Mitzy Ruvalcaba underwent a cystoscopy, right ureteroscopy, right ureteral stent exchange with Dr. Andrew Plata on 3/21/2023. No ureteral stone was noted at that time. She presents today for further evaluation and review of her renal US. This was not her first stone    Overall the problem(s) : are improving. Associated Symptoms: No dysuria, gross hematuria. No fevers or chills. No nausea or vomiting. Pain Controlled. UA: unable to urinate   Pain Scale 0        Imaging Reviewed during this Office Visit:   (results were independently reviewed by physician and radiology report verified)  I independently reviewed and verified the images and reports from:    US RENAL LIMITED    Result Date: 4/21/2023  PROCEDURE: US RENAL LIMITED CLINICAL INFORMATION: Nephrolithiasis TECHNIQUE: Ultrasound of the kidneys and urinary bladder was performed. Grayscale and color images were obtained. COMPARISON: Renal ultrasound 2/26/2023 FINDINGS: The right kidney measures 9.9 x 5.0 x 5.6 cm and the left kidney measures 9.9 x 4.4 x 5.1 cm. Renal cortical thickness and echogenicity are normal bilaterally.  There is no

## 2023-06-19 ENCOUNTER — TELEPHONE (OUTPATIENT)
Dept: UROLOGY | Age: 23
End: 2023-06-19

## 2023-06-19 NOTE — TELEPHONE ENCOUNTER
Patient scheduled for US RENAL LIMITED  at [facility] MR on 11/14/2023 ARRIVAL OF 545AM FOR A 6AM SCAN. NO CARBONATED BEVERAGES; ARRIVE WELL HYDRATED WITH A FULL BLADDER.     Order mailed with instructions  to the patient

## 2023-12-28 NOTE — PROGRESS NOTES
1407- pt to pacu. Resp easy, unlabored. Vss. Pt appears in n oacute distress. Pt no responsive at this time. 1414- pt continues resting in bed eyes closed. 1423- pt wakes, asks for mommy, drifts back to sleep before answering questions. 1435- report called to [address]. 1437- pt meets criteria for discharge from pacu. Awaiting transport    1450-Transport arrives to take pt to pacu. Patient requests all Lab, Cardiology, and Radiology Results on their Discharge Instructions

## 2024-12-16 ENCOUNTER — HOSPITAL ENCOUNTER (EMERGENCY)
Age: 24
Discharge: HOME | End: 2024-12-16
Payer: COMMERCIAL

## 2024-12-16 VITALS
HEIGHT: 63 IN | OXYGEN SATURATION: 96 % | DIASTOLIC BLOOD PRESSURE: 84 MMHG | BODY MASS INDEX: 29.23 KG/M2 | TEMPERATURE: 98.2 F | RESPIRATION RATE: 18 BRPM | WEIGHT: 165 LBS | HEART RATE: 100 BPM | SYSTOLIC BLOOD PRESSURE: 137 MMHG

## 2024-12-16 DIAGNOSIS — J20.9 ACUTE BRONCHITIS, UNSPECIFIED ORGANISM: Primary | ICD-10-CM

## 2024-12-16 PROCEDURE — 99213 OFFICE O/P EST LOW 20 MIN: CPT

## 2024-12-16 RX ORDER — PREDNISONE 20 MG/1
40 TABLET ORAL DAILY
Qty: 10 TABLET | Refills: 0 | Status: SHIPPED | OUTPATIENT
Start: 2024-12-16 | End: 2024-12-21

## 2024-12-16 RX ORDER — BENZONATATE 200 MG/1
200 CAPSULE ORAL 3 TIMES DAILY PRN
Qty: 15 CAPSULE | Refills: 0 | Status: SHIPPED | OUTPATIENT
Start: 2024-12-16 | End: 2024-12-21

## 2024-12-16 RX ORDER — AZITHROMYCIN 250 MG/1
TABLET, FILM COATED ORAL
Qty: 6 TABLET | Refills: 0 | Status: SHIPPED | OUTPATIENT
Start: 2024-12-16 | End: 2024-12-26

## 2024-12-16 ASSESSMENT — ENCOUNTER SYMPTOMS
DIARRHEA: 0
GASTROINTESTINAL NEGATIVE: 1
SINUS PRESSURE: 1
EYES NEGATIVE: 1
SINUS PAIN: 1
NAUSEA: 0
COUGH: 1
VOMITING: 0
ALLERGIC/IMMUNOLOGIC NEGATIVE: 1

## 2024-12-16 ASSESSMENT — PAIN - FUNCTIONAL ASSESSMENT: PAIN_FUNCTIONAL_ASSESSMENT: NONE - DENIES PAIN

## 2024-12-16 NOTE — ED PROVIDER NOTES
[facility] URGENT CARE  Urgent Care Encounter       CHIEF COMPLAINT       Chief Complaint   Patient presents with    Cough    Sinusitis              Nurses Notes reviewed and I agree except as noted in the HPI.  HISTORY OF PRESENT ILLNESS   Hyacinth Rivas is a 24 y.o. female who presents to urgent care for cough, congestion, chills and sinus pressure.  Symptoms started 4 days ago.  Has tried over-the-counter decongestant with relief.  States that her mucus is green when she coughs it up.  Denies sick contacts.  Denies nausea, vomiting and diarrhea.    The history is provided by the patient. No  was used.       REVIEW OF SYSTEMS     Review of Systems   Constitutional:  Positive for chills.   HENT:  Positive for congestion, sinus pressure and sinus pain.    Eyes: Negative.    Respiratory:  Positive for cough.    Cardiovascular: Negative.    Gastrointestinal: Negative.  Negative for diarrhea, nausea and vomiting.   Endocrine: Negative.    Genitourinary: Negative.    Musculoskeletal: Negative.    Skin: Negative.    Allergic/Immunologic: Negative.    Neurological: Negative.    Hematological: Negative.    Psychiatric/Behavioral: Negative.         PAST MEDICAL HISTORY         Diagnosis Date    ADHD (attention deficit hyperactivity disorder)     Learning difficulty     pt states only able to read at 2nd grade level    Premature baby     27 weeks       SURGICALHISTORY     Patient  has a past surgical history that includes Ventriculoperitoneal shunt; other surgical history; Achilles tendon surgery; Cholecystectomy, laparoscopic (N/A, 7/16/2017); Ureter surgery (Right, 1/26/2023); Cystoscopy (Right, 2/27/2023); and Ureter surgery (Right, 3/9/2023).    CURRENT MEDICATIONS       Previous Medications    No medications on file       ALLERGIES     Patient is has No Known Allergies.    Patients   There is no immunization history on file for this patient.    FAMILY HISTORY     Patient's family

## 2024-12-16 NOTE — DISCHARGE INSTRUCTIONS
Medications as prescribed.  Increase fluid intake.  Can take over-the-counter Motrin or Tylenol as needed.  Follow-up with family doctor in 3 to 5 days.  Go to emergency room for any difficulty breathing new or worsening symptoms.

## 2025-03-10 ENCOUNTER — OFFICE VISIT (OUTPATIENT)
Dept: FAMILY MEDICINE CLINIC | Age: 25
End: 2025-03-10
Payer: COMMERCIAL

## 2025-03-10 VITALS
TEMPERATURE: 98.5 F | HEART RATE: 96 BPM | BODY MASS INDEX: 35.58 KG/M2 | HEIGHT: 63 IN | RESPIRATION RATE: 16 BRPM | SYSTOLIC BLOOD PRESSURE: 120 MMHG | WEIGHT: 200.8 LBS | DIASTOLIC BLOOD PRESSURE: 72 MMHG

## 2025-03-10 DIAGNOSIS — Z11.4 SCREENING FOR HIV WITHOUT PRESENCE OF RISK FACTORS: ICD-10-CM

## 2025-03-10 DIAGNOSIS — Z11.59 NEED FOR HEPATITIS C SCREENING TEST: ICD-10-CM

## 2025-03-10 DIAGNOSIS — Z12.4 PAP SMEAR FOR CERVICAL CANCER SCREENING: ICD-10-CM

## 2025-03-10 DIAGNOSIS — G91.9 HYDROCEPHALUS WITH OPERATING SHUNT (HCC): Primary | ICD-10-CM

## 2025-03-10 PROCEDURE — G8419 CALC BMI OUT NRM PARAM NOF/U: HCPCS | Performed by: NURSE PRACTITIONER

## 2025-03-10 PROCEDURE — 4004F PT TOBACCO SCREEN RCVD TLK: CPT | Performed by: NURSE PRACTITIONER

## 2025-03-10 PROCEDURE — 99204 OFFICE O/P NEW MOD 45 MIN: CPT | Performed by: NURSE PRACTITIONER

## 2025-03-10 PROCEDURE — G8427 DOCREV CUR MEDS BY ELIG CLIN: HCPCS | Performed by: NURSE PRACTITIONER

## 2025-03-10 SDOH — ECONOMIC STABILITY: FOOD INSECURITY: WITHIN THE PAST 12 MONTHS, YOU WORRIED THAT YOUR FOOD WOULD RUN OUT BEFORE YOU GOT MONEY TO BUY MORE.: PATIENT DECLINED

## 2025-03-10 SDOH — ECONOMIC STABILITY: FOOD INSECURITY: WITHIN THE PAST 12 MONTHS, THE FOOD YOU BOUGHT JUST DIDN'T LAST AND YOU DIDN'T HAVE MONEY TO GET MORE.: PATIENT DECLINED

## 2025-03-10 ASSESSMENT — PATIENT HEALTH QUESTIONNAIRE - PHQ9
SUM OF ALL RESPONSES TO PHQ QUESTIONS 1-9: 0
2. FEELING DOWN, DEPRESSED OR HOPELESS: NOT AT ALL
1. LITTLE INTEREST OR PLEASURE IN DOING THINGS: NOT AT ALL

## 2025-03-10 ASSESSMENT — ENCOUNTER SYMPTOMS
SORE THROAT: 0
RHINORRHEA: 0
EYE REDNESS: 0
DIARRHEA: 0
ANAL BLEEDING: 0
SHORTNESS OF BREATH: 0
ABDOMINAL PAIN: 0
EYE DISCHARGE: 0
BLOOD IN STOOL: 0
COUGH: 0
COLOR CHANGE: 0
NAUSEA: 0
CONSTIPATION: 0
ABDOMINAL DISTENTION: 0

## 2025-03-10 NOTE — PROGRESS NOTES
Referral to OBGYN faxed on 3/10/25 with OV note and insurance card attached. Patient will call to schedule an appt.    [facility]:  [address], [address]  [place] [address]  Phone number: [phone]  Fax number: [phone]

## 2025-03-10 NOTE — PROGRESS NOTES
[facility]  [facility]  [address]  [facility] [address]  Dept: [phone]  Loc: [phone]    Visit Date: 3/10/2025    Hyacinth Rivas is a 24 y.o. female who presents today for:  Chief Complaint   Patient presents with    New Patient     Get established, pt's last PCP was Kori Cortes. No concerns at this time.     HPI:     Here to establish care. Previous PCP was Kori Cortes - here with [place] Jeny    Specialist:  None    Medications given by previous PCP:  None    HM:  PAP - 3/2021:  Final Cytologic Interpretation  ThinPrep Pap Test (Endocervical):    Satisfactory for evaluation. A transformation zone component is present.    NEGATIVE FOR INTRAEPITHELIAL LESION OR MALIGNANCY.     Tobacco Use:  Vape nicotine    Drug Use:  Denies    ETOH Use:  Occasionally     LMP:  Current    PMX: 27 weeker IVH, No NEC, lung hemorrhae at 4 days but no severe lung issues cleared in follow up Obstructive hydrocephalus (Primary Dx);History of prematurity with  intraventricular hemorrhage - Neurosurgery Antimicrobial Prophylaxis Recommendation for CSF Shunt Prophylaxis     shunted hydrocephalus 2' Grade IV IVH prematurity first shunted at 2 months by dr Amado in May, revised at one year for infection but mom states the ventricular catheter was the only change out. then fine. Non programmable shunt on the left. No fical weakness no sensory deficits wears glasses for reading     HPI  Health Maintenance   Topic Date Due    HIV screen  Never done    Hepatitis C screen  Never done    Pap smear  Never done    Pneumococcal 0-49 years Vaccine (2 of 2 - PCV) 2020    COVID-19 Vaccine (5 -  season) 2024    Flu vaccine (1) 03/10/2026 (Originally 2024)    Depression Screen  03/10/2026    DTaP/Tdap/Td vaccine (8 - Td or Tdap) 2028    Hepatitis A vaccine  Completed    Hepatitis B vaccine  Completed    Hib vaccine  Completed    HPV vaccine

## 2025-03-17 ENCOUNTER — RESULTS FOLLOW-UP (OUTPATIENT)
Dept: FAMILY MEDICINE CLINIC | Age: 25
End: 2025-03-17

## 2025-03-17 ENCOUNTER — LAB (OUTPATIENT)
Dept: LAB | Age: 25
End: 2025-03-17

## 2025-03-17 DIAGNOSIS — R71.8 ELEVATED HEMATOCRIT: ICD-10-CM

## 2025-03-17 DIAGNOSIS — G91.9 HYDROCEPHALUS WITH OPERATING SHUNT (HCC): ICD-10-CM

## 2025-03-17 DIAGNOSIS — Z11.59 NEED FOR HEPATITIS C SCREENING TEST: ICD-10-CM

## 2025-03-17 DIAGNOSIS — R71.8 ELEVATED RED BLOOD CELL COUNT: ICD-10-CM

## 2025-03-17 DIAGNOSIS — Z12.4 PAP SMEAR FOR CERVICAL CANCER SCREENING: ICD-10-CM

## 2025-03-17 DIAGNOSIS — D58.2 ELEVATED HEMOGLOBIN: Primary | ICD-10-CM

## 2025-03-17 DIAGNOSIS — Z11.4 SCREENING FOR HIV WITHOUT PRESENCE OF RISK FACTORS: ICD-10-CM

## 2025-03-17 LAB
25(OH)D3 SERPL-MCNC: 11 NG/ML (ref 30–100)
ALBUMIN SERPL BCG-MCNC: 4.4 G/DL (ref 3.4–4.9)
ALP SERPL-CCNC: 114 U/L (ref 35–104)
ALT SERPL W/O P-5'-P-CCNC: 41 U/L (ref 10–35)
ANION GAP SERPL CALC-SCNC: 13 MEQ/L (ref 8–16)
AST SERPL-CCNC: 24 U/L (ref 10–35)
BASOPHILS ABSOLUTE: 0.1 THOU/MM3 (ref 0–0.1)
BASOPHILS NFR BLD AUTO: 0.8 %
BILIRUB SERPL-MCNC: 0.6 MG/DL (ref 0.3–1.2)
BUN SERPL-MCNC: 8 MG/DL (ref 8–23)
CALCIUM SERPL-MCNC: 9.9 MG/DL (ref 8.6–10)
CHLORIDE SERPL-SCNC: 100 MEQ/L (ref 98–111)
CHOLEST SERPL-MCNC: 205 MG/DL (ref 100–199)
CO2 SERPL-SCNC: 25 MEQ/L (ref 22–29)
CREAT SERPL-MCNC: 0.9 MG/DL (ref 0.5–0.9)
DEPRECATED RDW RBC AUTO: 44 FL (ref 35–45)
EOSINOPHIL NFR BLD AUTO: 0.4 %
EOSINOPHILS ABSOLUTE: 0 THOU/MM3 (ref 0–0.4)
ERYTHROCYTE [DISTWIDTH] IN BLOOD BY AUTOMATED COUNT: 12.8 % (ref 11.5–14.5)
GFR SERPL CREATININE-BSD FRML MDRD: 90 ML/MIN/1.73M2
GLUCOSE FASTING: 91 MG/DL (ref 74–109)
GLUCOSE SERPL-MCNC: 91 MG/DL (ref 74–109)
HCT VFR BLD AUTO: 50.8 % (ref 37–47)
HCV IGG SERPL QL IA: NONREACTIVE
HDLC SERPL-MCNC: 55 MG/DL
HGB BLD-MCNC: 16.4 GM/DL (ref 12–16)
HIV 1+2 AB+HIV1 P24 AG SERPL QL IA: NORMAL
IMM GRANULOCYTES # BLD AUTO: 0.02 THOU/MM3 (ref 0–0.07)
IMM GRANULOCYTES NFR BLD AUTO: 0.2 %
LDLC SERPL CALC-MCNC: 111 MG/DL
LYMPHOCYTES ABSOLUTE: 2.9 THOU/MM3 (ref 1–4.8)
LYMPHOCYTES NFR BLD AUTO: 32.3 %
MCH RBC QN AUTO: 30 PG (ref 26–33)
MCHC RBC AUTO-ENTMCNC: 32.3 GM/DL (ref 32.2–35.5)
MCV RBC AUTO: 92.9 FL (ref 81–99)
MONOCYTES ABSOLUTE: 0.4 THOU/MM3 (ref 0.4–1.3)
MONOCYTES NFR BLD AUTO: 4.5 %
NEUTROPHILS ABSOLUTE: 5.6 THOU/MM3 (ref 1.8–7.7)
NEUTROPHILS NFR BLD AUTO: 61.8 %
NRBC BLD AUTO-RTO: 0 /100 WBC
PLATELET # BLD AUTO: 395 THOU/MM3 (ref 130–400)
PMV BLD AUTO: 12.1 FL (ref 9.4–12.4)
POTASSIUM SERPL-SCNC: 4.5 MEQ/L (ref 3.5–5.2)
PROT SERPL-MCNC: 8.2 G/DL (ref 6.4–8.3)
RBC # BLD AUTO: 5.47 MILL/MM3 (ref 4.2–5.4)
SODIUM SERPL-SCNC: 138 MEQ/L (ref 135–145)
TRIGL SERPL-MCNC: 193 MG/DL (ref 0–199)
TSH SERPL DL<=0.05 MIU/L-ACNC: 2.06 UIU/ML (ref 0.27–4.2)
WBC # BLD AUTO: 9.1 THOU/MM3 (ref 4.8–10.8)

## 2025-03-18 RX ORDER — ERGOCALCIFEROL 1.25 MG/1
50000 CAPSULE, LIQUID FILLED ORAL WEEKLY
Qty: 4 CAPSULE | Refills: 11 | Status: SHIPPED | OUTPATIENT
Start: 2025-03-18

## 2025-03-18 NOTE — TELEPHONE ENCOUNTER
Spoke to pt and she preferred that I contact her friend Jeny (on HIPAA) to discuss the results with her. Spoke to Jeny and notified her of the results and VISHNU recommendations and she verbalized understanding. She prefers to have the pt start weekly Vitamin D supplementation. Rx verified, ordered and set to EP to [facility]. Will get repeat lab orders at her scheduled appt with VISHNU on 4/14/25.     Chart reviewed and CBC looks stable. Please advise.

## 2025-03-19 NOTE — TELEPHONE ENCOUNTER
Spoke to Jeny and notified her that the pt is not anemic and doesn't need to have any labs done ASAP and she verbalized understanding. She was also notified that the RBC, hemoglobin and hematocrit were elevated and pt will need to instead repeat labs in 1 month and she verbalized understanding. Will f/up with VISHNU as scheduled on 4/14/25. She will get the lab orders at the time of the appointment.

## 2025-05-07 ENCOUNTER — LAB (OUTPATIENT)
Dept: LAB | Age: 25
End: 2025-05-07

## 2025-05-08 LAB — SOURCE: NORMAL

## 2025-05-09 LAB
C. TRACHOMATIS DNA,THIN PREP: NEGATIVE
N. GONORRHOEAE DNA, THIN PREP: NEGATIVE
SOURCE: NORMAL

## 2025-05-12 LAB
SOURCE: ABNORMAL
TRICHOMONAS VAGINALIS, MOLECULAR: POSITIVE

## 2025-05-18 LAB — CYTOLOGY THIN PREP PAP: NORMAL

## (undated) DEVICE — ADAPTER URO SCP UROLOK LL

## (undated) DEVICE — MEDI-VAC YANKAUER SUCTION HANDLE W/BULBOUS TIP: Brand: CARDINAL HEALTH

## (undated) DEVICE — GOWN,SIRUS,NON REINFRCD,LARGE,SET IN SL: Brand: MEDLINE

## (undated) DEVICE — BAG 3X6 DETACH NYL SPEC

## (undated) DEVICE — INTENDED FOR TISSUE SEPARATION, AND OTHER PROCEDURES THAT REQUIRE A SHARP SURGICAL BLADE TO PUNCTURE OR CUT.: Brand: BARD-PARKER ® CARBON RIB-BACK BLADES

## (undated) DEVICE — SINGLE ACTION PUMPING SYSTEM

## (undated) DEVICE — UNIVERSAL MONOPOLAR LAPAROSCOPIC CABLE 10FT, 4MM PIN CONNECTOR: Brand: CONMED

## (undated) DEVICE — APPLIER LIG CLP M L11IN TI STR RNG HNDL FOR 20 CLP DISP

## (undated) DEVICE — SOLUTION IV IRRIG WATER 1000ML POUR BRL 2F7114

## (undated) DEVICE — DUAL LUMEN URETERAL CATHETER

## (undated) DEVICE — SINGLE-USE DIGITAL FLEXIBLE URETEROSCOPE: Brand: LITHOVUE

## (undated) DEVICE — TROCAR: Brand: KII® SLEEVE

## (undated) DEVICE — MEDI-VAC NON-CONDUCTIVE SUCTION TUBING 6MM X 6.1M (20 FT.) L: Brand: CARDINAL HEALTH

## (undated) DEVICE — APPLIER CLP M L L11.4IN DIA10MM ENDOSCP ROT MULT FOR LIG

## (undated) DEVICE — GUIDEWIRE URO L150CM DIA0.035IN STIFF NIT HYDRPHLC STR TIP

## (undated) DEVICE — 2000CC GUARDIAN II: Brand: GUARDIAN

## (undated) DEVICE — SKIN AFFIX SURG ADHESIVE 72/CS 0.55ML: Brand: MEDLINE

## (undated) DEVICE — DUP USE 304928 DRESSING GZ 12 PLY 4X4 STRL

## (undated) DEVICE — 3M™ STERI-STRIP™ COMPOUND BENZOIN TINCTURE 40 BAGS/CARTON 4 CARTONS/CASE C1544: Brand: 3M™ STERI-STRIP™

## (undated) DEVICE — SUTURE VCRL SZ 2-0 L27IN ABSRB UD L26MM SH 1/2 CIR J417H

## (undated) DEVICE — COVER ARMBRD W13XL28.5IN IMPERV BLU FOR OP RM

## (undated) DEVICE — TROCARS: Brand: KII® BLUNT TIP ACCESS SYSTEM

## (undated) DEVICE — ULTRACLEAN ACCESSORY ELECTRODE 6" (15.24 CM) COATED BLADE: Brand: ULTRACLEAN

## (undated) DEVICE — SOLUTION IV 1000ML 0.9% SOD CHL PH 5 INJ USP VIAFLX PLAS

## (undated) DEVICE — SOLUTION IRRIG 3000ML 0.9% SOD CHL USP UROMATIC PLAS CONT

## (undated) DEVICE — GLOVE ORANGE PI 7   MSG9070

## (undated) DEVICE — TROCAR: Brand: KII SHIELDED BLADED ACCESS SYSTEM

## (undated) DEVICE — CYSTO PACK: Brand: MEDLINE INDUSTRIES, INC.

## (undated) DEVICE — EXCEL 10FT (3.05 M) INSUFFLATION TUBING SET WITH 0.1 MICRON FILTER: Brand: EXCEL

## (undated) DEVICE — GARMENT COMPR STD FOR 17IN CALF UNIF THER FLOTRN

## (undated) DEVICE — POOLE SUCTION HANDLE: Brand: CARDINAL HEALTH

## (undated) DEVICE — BLADE CLIPPER GEN PURP NS

## (undated) DEVICE — GLOVE SURG SZ 65 THK91MIL LTX FREE SYN POLYISOPRENE

## (undated) DEVICE — SOLUTION IV IRRIG POUR BRL 0.9% SODIUM CHL 2F7124

## (undated) DEVICE — ABDOMINAL PAD: Brand: DERMACEA

## (undated) DEVICE — Device

## (undated) DEVICE — STRIP,CLOSURE,WOUND,MEDI-STRIP,1/2X4: Brand: MEDLINE

## (undated) DEVICE — SUTURE VCRL SZ 0 L18IN ABSRB VLT SUTUPAK PRECUT W/O NDL J106T

## (undated) DEVICE — GENERAL LAPAROSCOPY PACK-LF: Brand: MEDLINE INDUSTRIES, INC.

## (undated) DEVICE — CORE TRUMPET FOR SINGLE SOLUTION BAG: Brand: CORE DYNAMICS

## (undated) DEVICE — CONVERTED USE 338908 SPONGES LAP 18X18 ST

## (undated) DEVICE — COAGULATOR ELECSURG BLADE 10 FTX1 IN PTFE STRL ULTRACLEAN